# Patient Record
Sex: MALE | Race: WHITE | NOT HISPANIC OR LATINO | Employment: FULL TIME | URBAN - NONMETROPOLITAN AREA
[De-identification: names, ages, dates, MRNs, and addresses within clinical notes are randomized per-mention and may not be internally consistent; named-entity substitution may affect disease eponyms.]

---

## 2018-10-05 NOTE — PROGRESS NOTES
SUBJECTIVE:   CC: Bucky Franco is an 43 year old male who presents for preventative health visit.     Healthy Habits:    Do you get at least three servings of calcium containing foods daily (dairy, green leafy vegetables, etc.)? yes    Amount of exercise or daily activities, outside of work: 1-2 hour(s) per day    Problems taking medications regularly Yes Not taken any currently and would like another script for Flonase    Medication side effects: No    Have you had an eye exam in the past two years? No, he wears corrective lenses.  He denies any vision changes.    Do you see a dentist twice per year? yes    Do you have sleep apnea, excessive snoring or daytime drowsiness?yes, daytime drowsiness           -------------------------------------    Today's PHQ-2 Score:   PHQ-2 ( 1999 Pf[employer]) 3/31/2015 5/3/2013   Q1: Little interest or pleasure in doing things 0 0   Q2: Feeling down, depressed or hopeless 0 0   PHQ-2 Score 0 0       Abuse: Current or Past(Physical, Sexual or Emotional)- No  Do you feel safe in your environment - Yes    Social History   Substance Use Topics     Smoking status: Never Smoker     Smokeless tobacco: Former User     Types: Snuff     Quit date: 10/1/2015     Alcohol use Yes      Comment: Rarely      If you drink alcohol do you typically have >3 drinks per day or >7 drinks per week? No                      Last PSA: No results found for: PSA    Reviewed orders with patient. Reviewed health maintenance and updated orders accordingly - Yes      Reviewed and updated as needed this visit by clinical staff  Tobacco  Allergies  Meds  Med Hx  Surg Hx  Fam Hx  Soc Hx        Reviewed and updated as needed this visit by Provider        Past Medical History:   Diagnosis Date     GERD (gastroesophageal reflux disease) 05/18/2012     Gout, unspecified 09/07/2005     Hyperlipidemia 05/18/2012      Past Surgical History:   Procedure Laterality Date     ADENOIDECTOMY       APPENDECTOMY        CIRCUMCISION       lap band       Onychomycoses       TONSILLECTOMY         ROS:  CONSTITUTIONAL: NEGATIVE for fever, chills, change in weight  INTEGUMENTARY/SKIN: NEGATIVE for worrisome rashes, moles or lesions,  He denies any color changes with his feet getting cold.  EYES: NEGATIVE for vision changes or irritation  ENT: POSITIVE for right nasal congestion  RESP: NEGATIVE for significant cough or SOB  CV: NEGATIVE for chest pain, palpitations or peripheral edema  GI: POSITIVE for heartburn or reflux and NEGATIVE for abdominal pain, constipation, diarrhea, dysphagia, melena, nausea, poor appetite and vomiting   male: negative for dysuria, hematuria, decreased urinary stream, erectile dysfunction, urethral discharge  MUSCULOSKELETAL:Sharp pain in the feet.  He has left neck pain with spasms that cause posterior headaches.  He denies any back pain.  NEURO: POSITIVE for paresthesias of the feet with the feeling of his sock being bunched up under his toes.  He reports that his feet do get cold and cramp when they are cold and NEGATIVE for dizziness/lightheadedness  PSYCHIATRIC: NEGATIVE for changes in mood or affect        He has a family history of heart disease in the his paternal uncle.    OBJECTIVE:   BP (!) 138/98 (BP Location: Left arm, Patient Position: Sitting, Cuff Size: Adult Large)  Pulse 99  Temp 99.1  F (37.3  C) (Tympanic)  Resp 18  Wt 307 lb 6.4 oz (139.4 kg)  SpO2 97%  BMI 44.11 kg/m2  EXAM:  GENERAL: healthy, alert and no distress  EYES: Eyes grossly normal to inspection, PERRL and conjunctivae and sclerae normal  EYES: Eyes grossly normal to inspection and conjunctivae and sclerae normal  HENT: ear canals and TM's normal, nose and mouth without ulcers or lesions  NECK: no adenopathy, no asymmetry, masses, or scars and thyroid normal to palpation  RESP: lungs clear to auscultation - no rales, rhonchi or wheezes  CV: regular rate and rhythm, normal S1 S2, no S3 or S4, no murmur, click or rub, no  peripheral edema and peripheral pulses strong  ABDOMEN: soft, nontender, no hepatosplenomegaly, no masses and bowel sounds normal  ABDOMEN: no bruits heard and abdomen is obese  MS: no gross musculoskeletal defects noted, no edema  SKIN: two non elevated asymmetric, abnormally pigmented nevi over the left flank  NEURO: Normal strength and tone, mentation intact and speech normal  NEURO: cranial nerves 3-12 intact  PSYCH: mentation appears normal, affect normal/bright  LYMPH: no cervical, supraclavicular, axillary, or inguinal adenopathy  Skin: posterior neck shows erythematous hair follicles  : no hernia  Diagnostic Test Results:  Results for orders placed or performed in visit on 10/11/18 (from the past 24 hour(s))   Hemoglobin A1c   Result Value Ref Range    Hemoglobin A1C 10.7 (H) 0 - 5.6 %   CK total   Result Value Ref Range    CK Total 72 30 - 300 U/L   TSH   Result Value Ref Range    TSH 2.36 0.40 - 4.00 mU/L   T4 free   Result Value Ref Range    T4 Free 0.96 0.76 - 1.46 ng/dL   Albumin Random Urine Quantitative with Creat Ratio   Result Value Ref Range    Creatinine Urine 81 mg/dL    Albumin Urine mg/L 125 mg/L    Albumin Urine mg/g Cr 154.70 (H) 0 - 17 mg/g Cr       ASSESSMENT/PLAN:   (Z00.00) Routine general medical examination at health care facility  (primary encounter diagnosis)  Comment: He is up to date on immunization.  No cancer screening is due at this time per his age.  Plan:   Stress test due to newly diagnosed diabetes and family history of coronary artery disease.    (E11.42) Type 2 diabetes mellitus with diabetic polyneuropathy, without long-term current use of insulin (H)  Comment: New diabetes diagnosis.  He will start an ACE inhibitor and a STATIN  Plan:   Start  metFORMIN (GLUCOPHAGE-XR) 500 MG 24 hr tablet, 1000 mg daily for 2 weeks then BID aspirin 81        MG tablet daily.    (E66.01) Morbid obesity (H)  Comment: This is the primary reason for his diabetes which is secondary to  "insulin resistance.  Plan:   We will be discussing exercise to reduce his weight at his next visit.    (E78.1) Hypertriglyceridemia  Comment: His thyroid is normal.  Plan:   Start Crestor 10 mg daily     (I10) Benign essential hypertension  Comment: Not at goal with new diagnosis.  Plan:   Start lisinopril (PRINIVIL/ZESTRIL) 10 MG tablet    (E78.00) Hypercholesterolemia  Comment: The 10-year ASCVD risk score (Rafaelcolleen DOWNING Jr, et al., 2013) is: 9.6%    Values used to calculate the score:      Age: 43 years      Sex: Male      Is Non- : No      Diabetic: Yes      Tobacco smoker: No      Systolic Blood Pressure: 138 mmHg      Is BP treated: Yes      HDL Cholesterol: 26 mg/dL      Total Cholesterol: 221 mg/dL    Plan:   Start rosuvastatin (CRESTOR) 10 MG tablet      (L73.9) Folliculitis  Comment:  Plan:   sulfamethoxazole-trimethoprim (BACTRIM DS/SEPTRA DS) 800-160 MG per tablet BID for 2 weeks.    (Z87.19) Hx of gastroesophageal reflux (GERD)  Comment: He is on continual zantac and his hemoglobin is normal.  Plan:   He will continue zantac.  Weight loss would likely be most beneficial for his GERD.    (R09.81) Congestion of paranasal sinus  Comment:   Plan:   Restart fluticasone (FLONASE) 50 MCG/ACT spray 2 sprays BID.          COUNSELING:  Reviewed preventive health counseling, as reflected in patient instructions       Regular exercise       Aspirin Prophylaxsis    BP Readings from Last 1 Encounters:   10/11/18 (!) 138/98     Estimated body mass index is 44.11 kg/(m^2) as calculated from the following:    Height as of 3/31/15: 5' 10\" (1.778 m).    Weight as of this encounter: 307 lb 6.4 oz (139.4 kg).    BP Screening:   Last 3 BP Readings:    BP Readings from Last 3 Encounters:   10/11/18 (!) 138/98   03/31/15 140/90   03/13/15 (!) 134/98       The following was recommended to the patient:  Re-screen BP within a year and recommended lifestyle modifications  Weight management plan: Discussed healthy " diet and exercise guidelines and patient will follow up in 1 month in clinic to re-evaluate.     reports that he has never smoked. He quit smokeless tobacco use about 3 years ago. His smokeless tobacco use included Snuff.      Counseling Resources:  ATP IV Guidelines  Pooled Cohorts Equation Calculator  FRAX Risk Assessment  ICSI Preventive Guidelines  Dietary Guidelines for Americans, 2010  USDA's MyPlate  ASA Prophylaxis  Lung CA Screening    Beltran Beal DO,   [facility] [place]

## 2018-10-11 ENCOUNTER — OFFICE VISIT (OUTPATIENT)
Dept: PEDIATRICS | Facility: OTHER | Age: 43
End: 2018-10-11
Attending: INTERNAL MEDICINE
Payer: COMMERCIAL

## 2018-10-11 VITALS
OXYGEN SATURATION: 97 % | SYSTOLIC BLOOD PRESSURE: 138 MMHG | DIASTOLIC BLOOD PRESSURE: 98 MMHG | BODY MASS INDEX: 44.11 KG/M2 | WEIGHT: 307.4 LBS | RESPIRATION RATE: 18 BRPM | TEMPERATURE: 99.1 F | HEART RATE: 99 BPM

## 2018-10-11 DIAGNOSIS — Z00.00 ROUTINE GENERAL MEDICAL EXAMINATION AT HEALTH CARE FACILITY: ICD-10-CM

## 2018-10-11 DIAGNOSIS — R09.81 CONGESTION OF PARANASAL SINUS: ICD-10-CM

## 2018-10-11 DIAGNOSIS — Z87.19 HX OF GASTROESOPHAGEAL REFLUX (GERD): ICD-10-CM

## 2018-10-11 DIAGNOSIS — Z00.00 ROUTINE GENERAL MEDICAL EXAMINATION AT HEALTH CARE FACILITY: Primary | ICD-10-CM

## 2018-10-11 DIAGNOSIS — E78.00 HYPERCHOLESTEROLEMIA: ICD-10-CM

## 2018-10-11 DIAGNOSIS — L73.9 FOLLICULITIS: ICD-10-CM

## 2018-10-11 DIAGNOSIS — E78.1 HYPERTRIGLYCERIDEMIA: ICD-10-CM

## 2018-10-11 DIAGNOSIS — E66.01 MORBID OBESITY (H): ICD-10-CM

## 2018-10-11 DIAGNOSIS — E11.42 TYPE 2 DIABETES MELLITUS WITH DIABETIC POLYNEUROPATHY, WITHOUT LONG-TERM CURRENT USE OF INSULIN (H): ICD-10-CM

## 2018-10-11 DIAGNOSIS — I10 BENIGN ESSENTIAL HYPERTENSION: ICD-10-CM

## 2018-10-11 LAB
ALBUMIN SERPL-MCNC: 3.8 G/DL (ref 3.4–5)
ALP SERPL-CCNC: 219 U/L (ref 40–150)
ALT SERPL W P-5'-P-CCNC: 57 U/L (ref 0–70)
ANION GAP SERPL CALCULATED.3IONS-SCNC: 10 MMOL/L (ref 3–14)
AST SERPL W P-5'-P-CCNC: 28 U/L (ref 0–45)
BILIRUB SERPL-MCNC: 0.4 MG/DL (ref 0.2–1.3)
BUN SERPL-MCNC: 11 MG/DL (ref 7–30)
CALCIUM SERPL-MCNC: 8.9 MG/DL (ref 8.5–10.1)
CHLORIDE SERPL-SCNC: 96 MMOL/L (ref 94–109)
CHOLEST SERPL-MCNC: 221 MG/DL
CK SERPL-CCNC: 72 U/L (ref 30–300)
CO2 SERPL-SCNC: 27 MMOL/L (ref 20–32)
CREAT SERPL-MCNC: 0.73 MG/DL (ref 0.66–1.25)
CREAT UR-MCNC: 81 MG/DL
ERYTHROCYTE [DISTWIDTH] IN BLOOD BY AUTOMATED COUNT: 12.5 % (ref 10–15)
EST. AVERAGE GLUCOSE BLD GHB EST-MCNC: 260 MG/DL
GFR SERPL CREATININE-BSD FRML MDRD: >90 ML/MIN/1.7M2
GLUCOSE SERPL-MCNC: 354 MG/DL (ref 70–99)
HBA1C MFR BLD: 10.7 % (ref 0–5.6)
HCT VFR BLD AUTO: 45.3 % (ref 40–53)
HDLC SERPL-MCNC: 26 MG/DL
HGB BLD-MCNC: 16 G/DL (ref 13.3–17.7)
LDLC SERPL CALC-MCNC: ABNORMAL MG/DL
MCH RBC QN AUTO: 30 PG (ref 26.5–33)
MCHC RBC AUTO-ENTMCNC: 35.3 G/DL (ref 31.5–36.5)
MCV RBC AUTO: 85 FL (ref 78–100)
MICROALBUMIN UR-MCNC: 125 MG/L
MICROALBUMIN/CREAT UR: 154.7 MG/G CR (ref 0–17)
NONHDLC SERPL-MCNC: 195 MG/DL
PLATELET # BLD AUTO: 285 10E9/L (ref 150–450)
POTASSIUM SERPL-SCNC: 3.8 MMOL/L (ref 3.4–5.3)
PROT SERPL-MCNC: 8.2 G/DL (ref 6.8–8.8)
RBC # BLD AUTO: 5.34 10E12/L (ref 4.4–5.9)
SODIUM SERPL-SCNC: 133 MMOL/L (ref 133–144)
T4 FREE SERPL-MCNC: 0.96 NG/DL (ref 0.76–1.46)
TRIGL SERPL-MCNC: 891 MG/DL
TSH SERPL DL<=0.005 MIU/L-ACNC: 2.36 MU/L (ref 0.4–4)
WBC # BLD AUTO: 12.9 10E9/L (ref 4–11)

## 2018-10-11 PROCEDURE — 82043 UR ALBUMIN QUANTITATIVE: CPT | Performed by: INTERNAL MEDICINE

## 2018-10-11 PROCEDURE — 85027 COMPLETE CBC AUTOMATED: CPT | Performed by: INTERNAL MEDICINE

## 2018-10-11 PROCEDURE — 80061 LIPID PANEL: CPT | Performed by: INTERNAL MEDICINE

## 2018-10-11 PROCEDURE — 83036 HEMOGLOBIN GLYCOSYLATED A1C: CPT | Performed by: INTERNAL MEDICINE

## 2018-10-11 PROCEDURE — 99396 PREV VISIT EST AGE 40-64: CPT | Performed by: INTERNAL MEDICINE

## 2018-10-11 PROCEDURE — 84439 ASSAY OF FREE THYROXINE: CPT | Performed by: INTERNAL MEDICINE

## 2018-10-11 PROCEDURE — 84443 ASSAY THYROID STIM HORMONE: CPT | Performed by: INTERNAL MEDICINE

## 2018-10-11 PROCEDURE — 80053 COMPREHEN METABOLIC PANEL: CPT | Performed by: INTERNAL MEDICINE

## 2018-10-11 PROCEDURE — 36415 COLL VENOUS BLD VENIPUNCTURE: CPT | Performed by: INTERNAL MEDICINE

## 2018-10-11 PROCEDURE — 82550 ASSAY OF CK (CPK): CPT | Performed by: INTERNAL MEDICINE

## 2018-10-11 RX ORDER — FLUTICASONE PROPIONATE 50 MCG
2 SPRAY, SUSPENSION (ML) NASAL DAILY
Qty: 3 BOTTLE | Refills: 3 | Status: SHIPPED | OUTPATIENT
Start: 2018-10-11 | End: 2022-02-15

## 2018-10-11 RX ORDER — ROSUVASTATIN CALCIUM 10 MG/1
10 TABLET, COATED ORAL DAILY
Qty: 90 TABLET | Refills: 3 | Status: SHIPPED | OUTPATIENT
Start: 2018-10-11 | End: 2019-10-02

## 2018-10-11 RX ORDER — SULFAMETHOXAZOLE/TRIMETHOPRIM 800-160 MG
1 TABLET ORAL 2 TIMES DAILY
Qty: 28 TABLET | Refills: 0 | Status: SHIPPED | OUTPATIENT
Start: 2018-10-11 | End: 2019-04-17

## 2018-10-11 RX ORDER — METFORMIN HCL 500 MG
1000 TABLET, EXTENDED RELEASE 24 HR ORAL 2 TIMES DAILY WITH MEALS
Qty: 120 TABLET | Refills: 3 | Status: SHIPPED | OUTPATIENT
Start: 2018-10-11 | End: 2019-01-10

## 2018-10-11 RX ORDER — LISINOPRIL 10 MG/1
10 TABLET ORAL DAILY
Qty: 90 TABLET | Refills: 1 | Status: SHIPPED | OUTPATIENT
Start: 2018-10-11 | End: 2018-10-31

## 2018-10-11 ASSESSMENT — ANXIETY QUESTIONNAIRES
5. BEING SO RESTLESS THAT IT IS HARD TO SIT STILL: NOT AT ALL
6. BECOMING EASILY ANNOYED OR IRRITABLE: SEVERAL DAYS
GAD7 TOTAL SCORE: 2
7. FEELING AFRAID AS IF SOMETHING AWFUL MIGHT HAPPEN: NOT AT ALL
IF YOU CHECKED OFF ANY PROBLEMS ON THIS QUESTIONNAIRE, HOW DIFFICULT HAVE THESE PROBLEMS MADE IT FOR YOU TO DO YOUR WORK, TAKE CARE OF THINGS AT HOME, OR GET ALONG WITH OTHER PEOPLE: NOT DIFFICULT AT ALL
2. NOT BEING ABLE TO STOP OR CONTROL WORRYING: NOT AT ALL
1. FEELING NERVOUS, ANXIOUS, OR ON EDGE: NOT AT ALL
3. WORRYING TOO MUCH ABOUT DIFFERENT THINGS: NOT AT ALL

## 2018-10-11 ASSESSMENT — PATIENT HEALTH QUESTIONNAIRE - PHQ9: 5. POOR APPETITE OR OVEREATING: SEVERAL DAYS

## 2018-10-11 ASSESSMENT — PAIN SCALES - GENERAL: PAINLEVEL: MILD PAIN (3)

## 2018-10-11 NOTE — MR AVS SNAPSHOT
After Visit Summary   10/11/2018    Bucky Franco    MRN: [id number]           Patient Information     Date Of Birth          1975        Visit Information        Provider Department      10/11/2018 1:20 PM Beltran Beal DO [facility] [place]        Today's Diagnoses     Routine general medical examination at health care facility    -  1    Hx of gastroesophageal reflux (GERD)        Congestion of paranasal sinus        Type 2 diabetes mellitus with diabetic polyneuropathy, without long-term current use of insulin (H)        Morbid obesity (H)        Hypertriglyceridemia        Benign essential hypertension        Hypercholesterolemia        Folliculitis          Care Instructions      Preventive Health Recommendations  Male Ages 40 to 49    Yearly exam:             See your health care provider every year in order to  o   Review health changes.   o   Discuss preventive care.    o   Review your medicines if your doctor has prescribed any.    You should be tested each year for STDs (sexually transmitted diseases) if you re at risk.     Have a cholesterol test every 5 years.     Have a colonoscopy (test for colon cancer) if someone in your family has had colon cancer or polyps before age 50.     After age 45, have a diabetes test (fasting glucose). If you are at risk for diabetes, you should have this test every 3 years.      Talk with your health care provider about whether or not a prostate cancer screening test (PSA) is right for you.    Shots: Get a flu shot each year. Get a tetanus shot every 10 years.     Nutrition:    Eat at least 5 servings of fruits and vegetables daily.     Eat whole-grain bread, whole-wheat pasta and brown rice instead of white grains and rice.     Get adequate Calcium and Vitamin D.     Lifestyle    Exercise for at least 150 minutes a week (30 minutes a day, 5 days a week). This will help you control your weight and prevent disease.     Limit  alcohol to one drink per day.     No smoking.     Wear sunscreen to prevent skin cancer.     See your dentist every six months for an exam and cleaning.              Follow-ups after your visit        Follow-up notes from your care team     Return in about 3 weeks (around 10/31/2018) for diabetes and HTN.      Your next 10 appointments already scheduled     Oct 31, 2018  2:00 PM CDT   (Arrive by 1:40 PM)   [facility] with Beltran Beal,    [facility]    [address] [place] [address]   [phone]              Who to contact     If you have questions or need follow up information about today's clinic visit or your schedule please contact [facility] directly at [phone].  Normal or non-critical lab and imaging results will be communicated to you by MyChart, letter or phone within 4 business days after the clinic has received the results. If you do not hear from us within 7 days, please contact the clinic through MyChart or phone. If you have a critical or abnormal lab result, we will notify you by phone as soon as possible.  Submit refill requests through [employer] or call your pharmacy and they will forward the refill request to us. Please allow 3 business days for your refill to be completed.          Additional Information About Your Visit        Care EveryWhere ID     This is your Care EveryWhere ID. This could be used by other organizations to access your [facility] medical records  [id number]        Your Vitals Were     Pulse Temperature Respirations Pulse Oximetry BMI (Body Mass Index)       99 99.1  F (37.3  C) (Tympanic) 18 97% 44.11 kg/m2        Blood Pressure from Last 3 Encounters:   10/11/18 (!) 138/98   03/31/15 140/90   03/13/15 (!) 134/98    Weight from Last 3 Encounters:   10/11/18 307 lb 6.4 oz (139.4 kg)   03/31/15 (!) 325 lb (147.4 kg)   03/13/15 (!) 328 lb (148.8 kg)              We Performed the  Following     Albumin Random Urine Quantitative with Creat Ratio     CK total     Hemoglobin A1c     T4 free     TSH          Today's Medication Changes          These changes are accurate as of 10/11/18  2:03 PM.  If you have any questions, ask your nurse or doctor.               Start taking these medicines.        Dose/Directions    lisinopril 10 MG tablet   Commonly known as:  PRINIVIL/ZESTRIL   Used for:  Type 2 diabetes mellitus with diabetic polyneuropathy, without long-term current use of insulin (H), Benign essential hypertension   Started by:  Beltran Beal DO        Dose:  10 mg   Take 1 tablet (10 mg) by mouth daily   Quantity:  90 tablet   Refills:  1       metFORMIN 500 MG 24 hr tablet   Commonly known as:  GLUCOPHAGE-XR   Used for:  Type 2 diabetes mellitus with diabetic polyneuropathy, without long-term current use of insulin (H)   Started by:  Beltran Beal DO        Dose:  1000 mg   Take 2 tablets (1,000 mg) by mouth 2 times daily (with meals)   Quantity:  120 tablet   Refills:  3       rosuvastatin 10 MG tablet   Commonly known as:  CRESTOR   Used for:  Type 2 diabetes mellitus with diabetic polyneuropathy, without long-term current use of insulin (H), Hypercholesterolemia   Started by:  Beltran Beal DO        Dose:  10 mg   Take 1 tablet (10 mg) by mouth daily   Quantity:  90 tablet   Refills:  3       sulfamethoxazole-trimethoprim 800-160 MG per tablet   Commonly known as:  BACTRIM DS/SEPTRA DS   Used for:  Folliculitis   Started by:  Beltran Beal DO        Dose:  1 tablet   Take 1 tablet by mouth 2 times daily for 14 days   Quantity:  28 tablet   Refills:  0         Stop taking these medicines if you haven't already. Please contact your care team if you have questions.     ADVIL 200 MG tablet   Generic drug:  ibuprofen   Stopped by:  Beltran Beal DO                Where to get your medicines      These medications were sent to [facility]  [facility] [address] [facility]  [address] [facility] [address]     Phone:  [phone]     fluticasone 50 MCG/ACT spray    lisinopril 10 MG tablet    metFORMIN 500 MG 24 hr tablet    rosuvastatin 10 MG tablet    sulfamethoxazole-trimethoprim 800-160 MG per tablet                Primary Care Provider Office Phone # Fax #    Beltran Beal  [phone]       [address] [facility] [address]        Equal Access to Services     [facility] : Hadii aad ku hadasho Soomaali, waaxda luqadaha, qaybta kaalmada adeegyada, waxay idiin hayaan drea adamson . So [facility] [phone].    ATENCIÓN: Si habla español, tiene a zaragoza disposición servicios gratuitos de asistencia lingüística. Ll[facility] [phone].    We comply with applicable federal civil rights laws and [place] laws. We do not discriminate on the basis of race, color, national origin, age, disability, sex, sexual orientation, or gender identity.            Thank you!     Thank you for choosing [facility]  for your care. Our goal is always to provide you with excellent care. Hearing back from our patients is one way we can continue to improve our services. Please take a few minutes to complete the written survey that you may receive in the mail after your visit with us. Thank you!             Your Updated Medication List - Protect others around you: Learn how to safely use, store and throw away your medicines at www.disposemymeds.org.          This list is accurate as of 10/11/18  2:03 PM.  Always use your most recent med list.                   Brand Name Dispense Instructions for use Diagnosis    aspirin 81 MG tablet     90 tablet    Take 1 tablet (81 mg) by mouth daily    Type 2 diabetes mellitus with diabetic polyneuropathy, without long-term current use of insulin (H)       fluticasone 50 MCG/ACT spray    FLONASE    3 Bottle    Spray 2 sprays into both nostrils daily    Congestion  of paranasal sinus       lisinopril 10 MG tablet    PRINIVIL/ZESTRIL    90 tablet    Take 1 tablet (10 mg) by mouth daily    Type 2 diabetes mellitus with diabetic polyneuropathy, without long-term current use of insulin (H), Benign essential hypertension       metFORMIN 500 MG 24 hr tablet    GLUCOPHAGE-XR    120 tablet    Take 2 tablets (1,000 mg) by mouth 2 times daily (with meals)    Type 2 diabetes mellitus with diabetic polyneuropathy, without long-term current use of insulin (H)       rosuvastatin 10 MG tablet    CRESTOR    90 tablet    Take 1 tablet (10 mg) by mouth daily    Type 2 diabetes mellitus with diabetic polyneuropathy, without long-term current use of insulin (H), Hypercholesterolemia       sulfamethoxazole-trimethoprim 800-160 MG per tablet    BACTRIM DS/SEPTRA DS    28 tablet    Take 1 tablet by mouth 2 times daily for 14 days    Folliculitis

## 2018-10-11 NOTE — NURSING NOTE
"Chief Complaint   Patient presents with     Physical       Initial BP (!) 158/101 (BP Location: Left arm, Patient Position: Sitting, Cuff Size: Adult Large)  Pulse 99  Temp 99.1  F (37.3  C) (Tympanic)  Resp 18  Wt 307 lb 6.4 oz (139.4 kg)  SpO2 97%  BMI 44.11 kg/m2 Estimated body mass index is 44.11 kg/(m^2) as calculated from the following:    Height as of 3/31/15: 5' 10\" (1.778 m).    Weight as of this encounter: 307 lb 6.4 oz (139.4 kg).  Medication Reconciliation: complete     Patient declined Influenza vaccine. BP recheck; 138/98 at 13:10    Susie Arriola LPN  "

## 2018-10-12 ASSESSMENT — PATIENT HEALTH QUESTIONNAIRE - PHQ9: SUM OF ALL RESPONSES TO PHQ QUESTIONS 1-9: 5

## 2018-10-12 ASSESSMENT — ANXIETY QUESTIONNAIRES: GAD7 TOTAL SCORE: 2

## 2018-10-26 NOTE — PROGRESS NOTES
SUBJECTIVE:   Bucky Franco is a 43 year old male who presents to clinic today for the following health issues:        Diabetes Follow-up      Patient is checking blood sugars: not at all    Diabetic concerns: None     Symptoms of hypoglycemia (low blood sugar): none     Paresthesias (numbness or burning in feet) or sores: Yes both feet, tingling and numbness and cold, but not cold to touch.     Date of last diabetic eye exam: Has not had one      BP Readings from Last 6 Encounters:   10/31/18 142/90   10/11/18 (!) 138/98   03/31/15 140/90   03/13/15 (!) 134/98   05/03/13 134/80     BP Readings from Last 2 Encounters:   10/11/18 (!) 138/98   03/31/15 140/90     Hemoglobin A1C (%)   Date Value   10/11/2018 10.7 (H)     LDL Cholesterol Calculated (mg/dL)   Date Value   10/11/2018     Cannot estimate LDL when triglyceride exceeds 400 mg/dL       Diabetes Management Resources    Amount of exercise or physical activity: 3 day/week for an average of 45-60 minutes    Problems taking medications regularly: No    Medication side effects: Headaches    Diet: regular (no restrictions)        Hypertension Follow-up      Outpatient blood pressures are not being checked.    Low Salt Diet: no added salt      Problem list and histories reviewed & adjusted, as indicated.  Additional history: as documented    Patient Active Problem List   Diagnosis     Health examination of defined subpopulation     Sinus headache     Somatic dysfunction of cervical region     Headache     Morbid obesity (H)     Type 2 diabetes mellitus without complication, without long-term current use of insulin (H)     Past Surgical History:   Procedure Laterality Date     ADENOIDECTOMY       APPENDECTOMY       CIRCUMCISION       lap band       Onychomycoses       TONSILLECTOMY         Social History   Substance Use Topics     Smoking status: Never Smoker     Smokeless tobacco: Former User     Types: Snuff     Quit date: 10/1/2015     Alcohol use Yes       "Comment: Rarely     Family History   Problem Relation Age of Onset     Hypertension Mother      Rheumatoid Arthritis Mother      Hyperlipidemia Father      Myocardial Infarction Paternal Grandfather      Diabetes Other      Asthma No family hx of            Reviewed and updated as needed this visit by clinical staff       Reviewed and updated as needed this visit by Provider         ROS:  CONSTITUTIONAL: NEGATIVE for fever, chills, change in weight  EYES: NEGATIVE for vision changes or irritation  ENT/MOUTH: NEGATIVE for ear, mouth and throat problems  RESP: NEGATIVE for significant cough or SOB  CV: NEGATIVE for chest pain, palpitations or peripheral edema  GI: NEGATIVE for nausea, abdominal pain, heartburn, or change in bowel habits  : NEGATIVE for frequency, dysuria, or hematuria  MUSCULOSKELETAL:POSITIVE  for neck pain in the form of spasms   NEURO: NEGATIVE for weakness, dizziness or paresthesias  PSYCHIATRIC: NEGATIVE for changes in mood or affect    OBJECTIVE:     /90  Pulse 82  Temp 98.6  F (37  C) (Tympanic)  Ht 5' 10\" (1.778 m)  Wt 302 lb (137 kg)  SpO2 98%  BMI 43.33 kg/m2  Body mass index is 43.33 kg/(m^2).  GENERAL: healthy, alert and no distress  EYES: Eyes grossly normal to inspection and conjunctivae and sclerae normal  NECK: no adenopathy, no asymmetry, masses, or scars and thyroid normal to palpation  RESP: lungs clear to auscultation - no rales, rhonchi or wheezes  CV: regular rate and rhythm, normal S1 S2, no S3 or S4, no murmur, click or rub, no peripheral edema and peripheral pulses strong  ABDOMEN: soft, nontender, no hepatosplenomegaly, no masses and bowel sounds normal  ABDOMEN: no bruits heard  MS: no gross musculoskeletal defects noted, no edema  SKIN: no suspicious lesions or rashes  SKIN: Mild erythema over the upper neck   PSYCH: mentation appears normal, affect normal/bright  Diabetic foot exam: normal DP and PT pulses, normal monofilament exam, reduced sensation at the " left 1st MTP and trophic changes of the heals with this callus bilaterally    Diagnostic Test Results:  Results for orders placed or performed in visit on 10/31/18 (from the past 24 hour(s))   Basic metabolic panel   Result Value Ref Range    Sodium 138 133 - 144 mmol/L    Potassium 3.8 3.4 - 5.3 mmol/L    Chloride 103 94 - 109 mmol/L    Carbon Dioxide 30 20 - 32 mmol/L    Anion Gap 5 3 - 14 mmol/L    Glucose 148 (H) 70 - 99 mg/dL    Urea Nitrogen 14 7 - 30 mg/dL    Creatinine 0.66 0.66 - 1.25 mg/dL    GFR Estimate >90 >60 mL/min/1.7m2    GFR Estimate If Black >90 >60 mL/min/1.7m2    Calcium 8.5 8.5 - 10.1 mg/dL   Fasting LAB      10/29/18  9:34 AM [id number] HI NUCLEAR MEDICINE       HI Electrocardiology    [employer] Interactive Report and InfoRx   View the interactive report   PACS Images   Show images for NM Exercise stress test   Study Result   PROCEDURE: NM MPI TREADMILL 10/29/2018 9:34 AM     HISTORY: ; Type 2 diabetes mellitus with diabetic polyneuropathy,  without long-term current use of insulin (H); Morbid obesity (H);  Hypercholesterolemia; Benign essential hypertension     COMPARISONS: None.     TECHNIQUE: Rest 10 mCi of technetium 99m sestamibi was injected. The  patient was stressed and 30 mCi of technetium 99m sestamibi was  injected.     FINDINGS: On the stress and rest images there was normal distribution  of tracer activity throughout the myocardium. There is no evidence of  myocardial ischemia. The end-diastolic volume measured 118 mL. The  end-systolic volume was 30 mL's. The calculated ejection fraction was  75%. No wall motion abnormalities are noted.          IMPRESSION:   1. No evidence of myocardial ischemia.  2. Normal left ventricular function.     RICHARD DORSEY MD     External Result Report   Stress EKG Interpretation   Order: [id number]   Status:  Final result   Visible to patient:  No (Not Released) Next appt:  None Dx:  Morbid obesity (H); Benign essential ...   Details   Reading  Physician Reading Date Result Priority   River Peck MD 10/29/2018    Narrative         This is a exercise Cardiolite study on patient Bucky Franco ordered by  Dr. Beal due to multiple cardiac risk factors.    The patient was placed upon the treadmill using Artem protocol. Went  for a total time of 5 minutes and 15 seconds into stage II at which  time was discontinued secondary to fatigue especially leg fatigue. Did  not develop any chest pain.    Resting heart rate was 94 suma to 175. Resting blood pressure 155/90  went to 210/90 for a double product of 36,700. He achieved 7 METs and  98% of maximum predicted heart rate.    Review of the electrocardiogram shows no acute ST-T changes. There  were no arrhythmias.    ASSESSMENT: Exercise Cardiolite study which the patient was limited by  fatigue especially his legs. Objectively adequate double product  significant hypertensive response with no evidence of ischemia on the  EKG. The Cardiolite scans are pending.    RIVER PECK MD       Last Resulted: 10/29/18  6:10 PM Order Details View Encounter Lab and Collection Details Routing Result History                ASSESSMENT/PLAN:   (E11.9) Type 2 diabetes mellitus without complication, without long-term current use of insulin (H)  Comment: Improved control on his fasting glucose level.  He is on an ACE inhibitor and a STATIN  Plan:    OPHTHALMOLOGY ADULT REFERRAL  He will continue metformin and start exercising 5 days a week as instructed.    (I10) Benign essential hypertension  Comment: Not at goal.  Plan:  Increase lisinopril (PRINIVIL/ZESTRIL) from 10 to 20 MG tablet      (L73.9) Folliculitis  Comment: Much improved  Plan: sulfamethoxazole-trimethoprim (BACTRIM DS/SEPTRA DS) 800-160 MG per tablet BID for 10 days.    (E66.01) Morbid obesity (H)  Comment: His weight is a main contributor ofg his diabetes.  His cardiac stress test is normal.  Plan:   He will start walking 5 days per week.    Patient  Instructions   For exercise:  Walk at a brisk pace for 10-15 minutes 5 days.  Increase time weekly by 3-5 minutes to a goal of 60 minutes.      (E11.9) Encounter for diabetic foot exam (H)  (primary encounter diagnosis)  Comment: He has a good foot exam with some mild hell callus forming.  Plan:   He was educated on foot care and skin care and agrees to apply Eucerin cream once daily.    FUTURE APPOINTMENTS:       - Follow-up visit in 10 weeks for diabetes, obesity and HTN    Beltran Beal DO, DO  [facility] [place]

## 2018-10-29 ENCOUNTER — HOSPITAL ENCOUNTER (OUTPATIENT)
Dept: NUCLEAR MEDICINE | Facility: HOSPITAL | Age: 43
Setting detail: NUCLEAR MEDICINE
Discharge: HOME | End: 2018-10-29
Attending: INTERNAL MEDICINE | Admitting: INTERNAL MEDICINE
Payer: COMMERCIAL

## 2018-10-29 ENCOUNTER — HOSPITAL ENCOUNTER (OUTPATIENT)
Dept: CARDIOLOGY | Facility: HOSPITAL | Age: 43
Setting detail: NUCLEAR MEDICINE
End: 2018-10-29
Attending: INTERNAL MEDICINE
Payer: COMMERCIAL

## 2018-10-29 ENCOUNTER — HOSPITAL ENCOUNTER (OUTPATIENT)
Dept: NUCLEAR MEDICINE | Facility: HOSPITAL | Age: 43
Setting detail: NUCLEAR MEDICINE
End: 2018-10-29
Attending: INTERNAL MEDICINE
Payer: COMMERCIAL

## 2018-10-29 DIAGNOSIS — E66.01 MORBID OBESITY (H): ICD-10-CM

## 2018-10-29 DIAGNOSIS — I10 BENIGN ESSENTIAL HYPERTENSION: ICD-10-CM

## 2018-10-29 DIAGNOSIS — E78.00 HYPERCHOLESTEROLEMIA: ICD-10-CM

## 2018-10-29 DIAGNOSIS — E11.42 TYPE 2 DIABETES MELLITUS WITH DIABETIC POLYNEUROPATHY, WITHOUT LONG-TERM CURRENT USE OF INSULIN (H): ICD-10-CM

## 2018-10-29 PROCEDURE — A9500 TC99M SESTAMIBI: HCPCS | Performed by: RADIOLOGY

## 2018-10-29 PROCEDURE — 78452 HT MUSCLE IMAGE SPECT MULT: CPT | Mod: TC

## 2018-10-29 PROCEDURE — 93017 CV STRESS TEST TRACING ONLY: CPT

## 2018-10-29 PROCEDURE — 34300033 ZZH RX 343: Performed by: RADIOLOGY

## 2018-10-29 PROCEDURE — 93016 CV STRESS TEST SUPVJ ONLY: CPT | Performed by: INTERNAL MEDICINE

## 2018-10-29 PROCEDURE — 93018 CV STRESS TEST I&R ONLY: CPT | Performed by: INTERNAL MEDICINE

## 2018-10-29 PROCEDURE — 25000128 H RX IP 250 OP 636: Performed by: INTERNAL MEDICINE

## 2018-10-29 RX ORDER — SODIUM CHLORIDE 9 MG/ML
INJECTION, SOLUTION INTRAVENOUS ONCE
Status: COMPLETED | OUTPATIENT
Start: 2018-10-29 | End: 2018-10-29

## 2018-10-29 RX ADMIN — Medication 30 MILLICURIE: at 08:18

## 2018-10-29 RX ADMIN — SODIUM CHLORIDE: 9 INJECTION, SOLUTION INTRAVENOUS at 08:05

## 2018-10-29 RX ADMIN — Medication 10 MILLICURIE: at 06:36

## 2018-10-30 PROBLEM — E11.9 TYPE 2 DIABETES MELLITUS WITHOUT COMPLICATION, WITHOUT LONG-TERM CURRENT USE OF INSULIN (H): Status: ACTIVE | Noted: 2018-10-30

## 2018-10-31 ENCOUNTER — OFFICE VISIT (OUTPATIENT)
Dept: PEDIATRICS | Facility: OTHER | Age: 43
End: 2018-10-31
Attending: INTERNAL MEDICINE
Payer: COMMERCIAL

## 2018-10-31 VITALS
OXYGEN SATURATION: 98 % | BODY MASS INDEX: 43.23 KG/M2 | HEART RATE: 82 BPM | SYSTOLIC BLOOD PRESSURE: 140 MMHG | DIASTOLIC BLOOD PRESSURE: 80 MMHG | WEIGHT: 302 LBS | HEIGHT: 70 IN | TEMPERATURE: 98.6 F

## 2018-10-31 DIAGNOSIS — I10 BENIGN ESSENTIAL HYPERTENSION: ICD-10-CM

## 2018-10-31 DIAGNOSIS — E11.9 ENCOUNTER FOR DIABETIC FOOT EXAM (H): Primary | ICD-10-CM

## 2018-10-31 DIAGNOSIS — E11.9 TYPE 2 DIABETES MELLITUS WITHOUT COMPLICATION, WITHOUT LONG-TERM CURRENT USE OF INSULIN (H): ICD-10-CM

## 2018-10-31 DIAGNOSIS — L73.9 FOLLICULITIS: ICD-10-CM

## 2018-10-31 DIAGNOSIS — E11.42 TYPE 2 DIABETES MELLITUS WITH DIABETIC POLYNEUROPATHY, WITHOUT LONG-TERM CURRENT USE OF INSULIN (H): ICD-10-CM

## 2018-10-31 DIAGNOSIS — E66.01 MORBID OBESITY (H): ICD-10-CM

## 2018-10-31 LAB
ANION GAP SERPL CALCULATED.3IONS-SCNC: 5 MMOL/L (ref 3–14)
BUN SERPL-MCNC: 14 MG/DL (ref 7–30)
CALCIUM SERPL-MCNC: 8.5 MG/DL (ref 8.5–10.1)
CHLORIDE SERPL-SCNC: 103 MMOL/L (ref 94–109)
CO2 SERPL-SCNC: 30 MMOL/L (ref 20–32)
CREAT SERPL-MCNC: 0.66 MG/DL (ref 0.66–1.25)
GFR SERPL CREATININE-BSD FRML MDRD: >90 ML/MIN/1.7M2
GLUCOSE SERPL-MCNC: 148 MG/DL (ref 70–99)
POTASSIUM SERPL-SCNC: 3.8 MMOL/L (ref 3.4–5.3)
SODIUM SERPL-SCNC: 138 MMOL/L (ref 133–144)

## 2018-10-31 PROCEDURE — 80048 BASIC METABOLIC PNL TOTAL CA: CPT | Performed by: INTERNAL MEDICINE

## 2018-10-31 PROCEDURE — 99214 OFFICE O/P EST MOD 30 MIN: CPT | Performed by: INTERNAL MEDICINE

## 2018-10-31 PROCEDURE — 36415 COLL VENOUS BLD VENIPUNCTURE: CPT | Performed by: INTERNAL MEDICINE

## 2018-10-31 RX ORDER — SULFAMETHOXAZOLE/TRIMETHOPRIM 800-160 MG
1 TABLET ORAL 2 TIMES DAILY
Qty: 20 TABLET | Refills: 0 | Status: SHIPPED | OUTPATIENT
Start: 2018-10-31 | End: 2019-01-10

## 2018-10-31 RX ORDER — LISINOPRIL 20 MG/1
20 TABLET ORAL DAILY
Qty: 90 TABLET | Refills: 1 | Status: SHIPPED | OUTPATIENT
Start: 2018-10-31 | End: 2019-01-31

## 2018-10-31 ASSESSMENT — ANXIETY QUESTIONNAIRES
5. BEING SO RESTLESS THAT IT IS HARD TO SIT STILL: NOT AT ALL
2. NOT BEING ABLE TO STOP OR CONTROL WORRYING: NOT AT ALL
3. WORRYING TOO MUCH ABOUT DIFFERENT THINGS: NOT AT ALL
7. FEELING AFRAID AS IF SOMETHING AWFUL MIGHT HAPPEN: NOT AT ALL
GAD7 TOTAL SCORE: 0
6. BECOMING EASILY ANNOYED OR IRRITABLE: NOT AT ALL
1. FEELING NERVOUS, ANXIOUS, OR ON EDGE: NOT AT ALL

## 2018-10-31 ASSESSMENT — PAIN SCALES - GENERAL: PAINLEVEL: MODERATE PAIN (5)

## 2018-10-31 ASSESSMENT — PATIENT HEALTH QUESTIONNAIRE - PHQ9
5. POOR APPETITE OR OVEREATING: NOT AT ALL
SUM OF ALL RESPONSES TO PHQ QUESTIONS 1-9: 2

## 2018-10-31 NOTE — MR AVS SNAPSHOT
After Visit Summary   10/31/2018    Bucky Franco    MRN: [id number]           Patient Information     Date Of Birth          1975        Visit Information        Provider Department      10/31/2018 2:00 PM Beltran Beal DO [facility] [place]        Today's Diagnoses     Encounter for diabetic foot exam (H)    -  1    Type 2 diabetes mellitus without complication, without long-term current use of insulin (H)        Benign essential hypertension        Folliculitis        Type 2 diabetes mellitus with diabetic polyneuropathy, without long-term current use of insulin (H)        Morbid obesity (H)          Care Instructions    For exercise:  Walk at a brisk pace for 10-15 minutes 5 days.  Increase time weekly by 3-5 minutes to a goal of 60 minutes.          Follow-ups after your visit        Additional Services     OPHTHALMOLOGY ADULT REFERRAL       Your provider has referred you to: PREFERRED PROVIDERS:  [facility]    Please be aware that coverage of these services is subject to the terms and limitations of your health insurance plan.  Call member services at your health plan with any benefit or coverage questions.      Please bring the following with you to your appointment:    (1) Any X-Rays, CTs or MRIs which have been performed.  Contact the facility where they were done to arrange for  prior to your scheduled appointment.    (2) List of current medications  (3) This referral request   (4) Any documents/labs given to you for this referral                  Follow-up notes from your care team     Return in about 10 weeks (around 1/9/2019) for Diabetes .      Your next 10 appointments already scheduled     Alberto 10, 2019  9:40 AM CST   (Arrive by 9:20 AM)   [facility] [place] [facility] [place] )    [address] [place] [address]   [phone]              Who to contact     If you  "have questions or need follow up information about today's clinic visit or your schedule please contact [facility] directly at [phone].  Normal or non-critical lab and imaging results will be communicated to you by MyChart, letter or phone within 4 business days after the clinic has received the results. If you do not hear from us within 7 days, please contact the clinic through MyChart or phone. If you have a critical or abnormal lab result, we will notify you by phone as soon as possible.  Submit refill requests through [employer] or call your pharmacy and they will forward the refill request to us. Please allow 3 business days for your refill to be completed.          Additional Information About Your Visit        Care EveryWhere ID     This is your Care EveryWhere ID. This could be used by other organizations to access your [facility] medical records  [id number]        Your Vitals Were     Pulse Temperature Height Pulse Oximetry BMI (Body Mass Index)       82 98.6  F (37  C) (Tympanic) 5' 10\" (1.778 m) 98% 43.33 kg/m2        Blood Pressure from Last 3 Encounters:   10/31/18 140/80   10/11/18 (!) 138/98   03/31/15 140/90    Weight from Last 3 Encounters:   10/31/18 302 lb (137 kg)   10/11/18 307 lb 6.4 oz (139.4 kg)   03/31/15 (!) 325 lb (147.4 kg)              We Performed the Following     OPHTHALMOLOGY ADULT REFERRAL          Today's Medication Changes          These changes are accurate as of 10/31/18  2:33 PM.  If you have any questions, ask your nurse or doctor.               Start taking these medicines.        Dose/Directions    sulfamethoxazole-trimethoprim 800-160 MG per tablet   Commonly known as:  BACTRIM DS/SEPTRA DS   Used for:  Folliculitis   Started by:  Beltran Beal DO        Dose:  1 tablet   Take 1 tablet by mouth 2 times daily   Quantity:  20 tablet   Refills:  0         These medicines have changed or have updated prescriptions.        Dose/Directions    " lisinopril 20 MG tablet   Commonly known as:  PRINIVIL/ZESTRIL   This may have changed:    - medication strength  - how much to take   Used for:  Type 2 diabetes mellitus with diabetic polyneuropathy, without long-term current use of insulin (H), Benign essential hypertension   Changed by:  Beltran Beal DO        Dose:  20 mg   Take 1 tablet (20 mg) by mouth daily   Quantity:  90 tablet   Refills:  1            Where to get your medicines      These medications were sent to [facility] [place] - [address]  [address] [address]     Phone:  [phone]     lisinopril 20 MG tablet    sulfamethoxazole-trimethoprim 800-160 MG per tablet                Primary Care Provider Office Phone # Fax #    Beltran Beal [phone] [address] [facility] [address]        Equal Access to Services     [facility] : Hadii iona perez hadasho Soomaali, waaxda luqadaha, qaybta kaalmada adeegyada, waxay rhysin haynikkyn drea adamson . So [facility] [phone].    ATENCIÓN: Si habla español, tiene a zaragoza disposición servicios gratuitos de asistencia lingüística. [facility] [phone].    We comply with applicable federal civil rights laws and [place] laws. We do not discriminate on the basis of race, color, national origin, age, disability, sex, sexual orientation, or gender identity.            Thank you!     Thank you for choosing [facility]  for your care. Our goal is always to provide you with excellent care. Hearing back from our patients is one way we can continue to improve our services. Please take a few minutes to complete the written survey that you may receive in the mail after your visit with us. Thank you!             Your Updated Medication List - Protect others around you: Learn how to safely use, store and throw away your medicines at www.disposemymeds.org.          This list is accurate as of 10/31/18  2:33 PM.   Always use your most recent med list.                   Brand Name Dispense Instructions for use Diagnosis    aspirin 81 MG tablet     90 tablet    Take 1 tablet (81 mg) by mouth daily    Type 2 diabetes mellitus with diabetic polyneuropathy, without long-term current use of insulin (H)       fluticasone 50 MCG/ACT spray    FLONASE    3 Bottle    Spray 2 sprays into both nostrils daily    Congestion of paranasal sinus       lisinopril 20 MG tablet    PRINIVIL/ZESTRIL    90 tablet    Take 1 tablet (20 mg) by mouth daily    Type 2 diabetes mellitus with diabetic polyneuropathy, without long-term current use of insulin (H), Benign essential hypertension       metFORMIN 500 MG 24 hr tablet    GLUCOPHAGE-XR    120 tablet    Take 2 tablets (1,000 mg) by mouth 2 times daily (with meals)    Type 2 diabetes mellitus with diabetic polyneuropathy, without long-term current use of insulin (H)       rosuvastatin 10 MG tablet    CRESTOR    90 tablet    Take 1 tablet (10 mg) by mouth daily    Type 2 diabetes mellitus with diabetic polyneuropathy, without long-term current use of insulin (H), Hypercholesterolemia       sulfamethoxazole-trimethoprim 800-160 MG per tablet    BACTRIM DS/SEPTRA DS    20 tablet    Take 1 tablet by mouth 2 times daily    Folliculitis

## 2018-10-31 NOTE — NURSING NOTE
"Chief Complaint   Patient presents with     Diabetes       Initial /90  Pulse 82  Temp 98.6  F (37  C) (Tympanic)  Ht 5' 10\" (1.778 m)  Wt 302 lb (137 kg)  SpO2 98%  BMI 43.33 kg/m2 Estimated body mass index is 43.33 kg/(m^2) as calculated from the following:    Height as of this encounter: 5' 10\" (1.778 m).    Weight as of this encounter: 302 lb (137 kg).  Medication Reconciliation: complete    Ellie Virgen LPN  "

## 2018-10-31 NOTE — PATIENT INSTRUCTIONS
For exercise:  Walk at a brisk pace for 10-15 minutes 5 days.  Increase time weekly by 3-5 minutes to a goal of 60 minutes.

## 2018-11-01 ASSESSMENT — ANXIETY QUESTIONNAIRES: GAD7 TOTAL SCORE: 0

## 2018-11-19 ENCOUNTER — TRANSFERRED RECORDS (OUTPATIENT)
Dept: HEALTH INFORMATION MANAGEMENT | Facility: CLINIC | Age: 43
End: 2018-11-19

## 2019-01-07 NOTE — PROGRESS NOTES
SUBJECTIVE:   Bucky Franco is a 43 year old male who presents to clinic today for the following health issues:      Diabetes Follow-up      Patient is checking blood sugars: not at all    Diabetic concerns: None     Symptoms of hypoglycemia (low blood sugar): none     Paresthesias (numbness or burning in feet) or sores: Yes Tingling, numbness and throbbing sometimes     Date of last diabetic eye exam: last month (December)    BP Readings from Last 2 Encounters:   01/10/19 150/90   10/31/18 140/80     Hemoglobin A1C (%)   Date Value   10/11/2018 10.7 (H)     LDL Cholesterol Calculated (mg/dL)   Date Value   10/11/2018     Cannot estimate LDL when triglyceride exceeds 400 mg/dL     He has started walking.      Wt Readings from Last 5 Encounters:   01/10/19 139.7 kg (308 lb)   10/31/18 137 kg (302 lb)   10/11/18 139.4 kg (307 lb 6.4 oz)   03/31/15 (!) 147.4 kg (325 lb)   03/13/15 (!) 148.8 kg (328 lb)     Diabetes Management Resources  Hypertension Follow-up      Outpatient blood pressures are not being checked.    Low Salt Diet: no added salt      Amount of exercise or physical activity: None    Problems taking medications regularly: No    Medication side effects: none    Diet: regular (no restrictions)      BP Readings from Last 6 Encounters:   01/10/19 150/78   10/31/18 140/80   10/11/18 (!) 138/98   03/31/15 140/90   03/13/15 (!) 134/98   05/03/13 134/80       Bucky was fasting today and did not take his blood pressure medications    -------------------------------------    Problem list and histories reviewed & adjusted, as indicated.  Additional history: as documented    Patient Active Problem List   Diagnosis     Health examination of defined subpopulation     Sinus headache     Somatic dysfunction of cervical region     Headache     Morbid obesity (H)     Type 2 diabetes mellitus without complication, without long-term current use of insulin (H)     Encounter for diabetic foot exam (H)     Benign essential  hypertension     Past Surgical History:   Procedure Laterality Date     ADENOIDECTOMY       APPENDECTOMY       CIRCUMCISION       lap band       Onychomycoses       TONSILLECTOMY         Social History     Tobacco Use     Smoking status: Never Smoker     Smokeless tobacco: Former User     Types: Snuff   Substance Use Topics     Alcohol use: Yes     Comment: Rarely     Family History   Problem Relation Age of Onset     Hypertension Mother      Rheumatoid Arthritis Mother      Hyperlipidemia Father      Myocardial Infarction Paternal Grandfather      Diabetes Other      Asthma No family hx of            Reviewed and updated as needed this visit by clinical staff  Tobacco  Allergies  Meds  Med Hx  Surg Hx  Fam Hx  Soc Hx      Reviewed and updated as needed this visit by Provider         ROS:  Constitutional, HEENT, cardiovascular, pulmonary, gi and gu systems are negative, except as otherwise noted.    OBJECTIVE:     /78 (BP Location: Left arm, Patient Position: Chair, Cuff Size: Adult Large)   Pulse 94   Temp 98.9  F (37.2  C) (Tympanic)   Resp 20   Wt 139.7 kg (308 lb)   SpO2 97%   BMI 44.19 kg/m    Body mass index is 44.19 kg/m .  GENERAL: healthy, alert and no distress  EYES: Eyes grossly normal to inspection  RESP: lungs clear to auscultation - no rales, rhonchi or wheezes  CV: regular rate and rhythm, normal S1 S2, no S3 or S4, no murmur, click or rub, no peripheral edema and peripheral pulses strong  ABDOMEN: soft, nontender, no hepatosplenomegaly, no masses and bowel sounds normal  MS: no gross musculoskeletal defects noted, no edema  PSYCH: mentation appears normal, affect normal/bright  Skin;  There is a 3 mm lesion at the posterior neck with some mile induration and erythema        Diagnostic Test Results:  Results for orders placed or performed in visit on 01/10/19 (from the past 24 hour(s))   Lipid Profile (Chol, Trig, HDL, LDL calc)   Result Value Ref Range    Cholesterol 132 <200 mg/dL     Triglycerides 234 (H) <150 mg/dL    HDL Cholesterol 35 (L) >39 mg/dL    LDL Cholesterol Calculated 50 <100 mg/dL    Non HDL Cholesterol 97 <130 mg/dL   CK total   Result Value Ref Range    CK Total 57 30 - 300 U/L   Comprehensive metabolic panel   Result Value Ref Range    Sodium 137 133 - 144 mmol/L    Potassium 3.8 3.4 - 5.3 mmol/L    Chloride 102 94 - 109 mmol/L    Carbon Dioxide 27 20 - 32 mmol/L    Anion Gap 8 3 - 14 mmol/L    Glucose 243 (H) 70 - 99 mg/dL    Urea Nitrogen 13 7 - 30 mg/dL    Creatinine 0.62 (L) 0.66 - 1.25 mg/dL    GFR Estimate >90 >60 mL/min/[1.73_m2]    GFR Estimate If Black >90 >60 mL/min/[1.73_m2]    Calcium 8.6 8.5 - 10.1 mg/dL    Bilirubin Total 0.6 0.2 - 1.3 mg/dL    Albumin 3.7 3.4 - 5.0 g/dL    Protein Total 7.4 6.8 - 8.8 g/dL    Alkaline Phosphatase 150 40 - 150 U/L    ALT 44 0 - 70 U/L    AST 26 0 - 45 U/L   Hemoglobin A1c   Result Value Ref Range    Hemoglobin A1C 9.2 (H) 0 - 5.6 %     Recent Labs   Lab Test 01/10/19  0942 10/11/18  1245   CHOL 132 221*   HDL 35* 26*   LDL 50 Cannot estimate LDL when triglyceride exceeds 400 mg/dL   TRIG 234* 891*       ASSESSMENT/PLAN:   (I10) Benign essential hypertension  (primary encounter diagnosis)  Comment: Not at goal today due to his misunderstanding that fasting is to with hold food and not medication.  Plan:   He will continue lisinopril 20 mg daily.    (E11.9) Type 2 diabetes mellitus without complication, without long-term current use of insulin (H)  Comment: Improved but less than expected.  He is on an ACE inhibitor and a STATIN  Plan:   He will continue metformin 1000 mg BID and add Amaryl 2 mg BID                FUTURE APPOINTMENTS:       - Follow-up visit in 3 months with labs first    Beltran Beal DO, DO  [facility] [place]

## 2019-01-10 ENCOUNTER — OFFICE VISIT (OUTPATIENT)
Dept: INTERNAL MEDICINE | Facility: OTHER | Age: 44
End: 2019-01-10
Attending: INTERNAL MEDICINE
Payer: COMMERCIAL

## 2019-01-10 VITALS
HEART RATE: 94 BPM | OXYGEN SATURATION: 97 % | WEIGHT: 308 LBS | DIASTOLIC BLOOD PRESSURE: 78 MMHG | RESPIRATION RATE: 20 BRPM | TEMPERATURE: 98.9 F | SYSTOLIC BLOOD PRESSURE: 150 MMHG | BODY MASS INDEX: 44.19 KG/M2

## 2019-01-10 DIAGNOSIS — E11.42 TYPE 2 DIABETES MELLITUS WITH DIABETIC POLYNEUROPATHY, WITHOUT LONG-TERM CURRENT USE OF INSULIN (H): ICD-10-CM

## 2019-01-10 DIAGNOSIS — L73.9 FOLLICULITIS: ICD-10-CM

## 2019-01-10 DIAGNOSIS — E11.9 TYPE 2 DIABETES MELLITUS WITHOUT COMPLICATION, WITHOUT LONG-TERM CURRENT USE OF INSULIN (H): ICD-10-CM

## 2019-01-10 DIAGNOSIS — I10 BENIGN ESSENTIAL HYPERTENSION: Primary | ICD-10-CM

## 2019-01-10 LAB
ALBUMIN SERPL-MCNC: 3.7 G/DL (ref 3.4–5)
ALP SERPL-CCNC: 150 U/L (ref 40–150)
ALT SERPL W P-5'-P-CCNC: 44 U/L (ref 0–70)
ANION GAP SERPL CALCULATED.3IONS-SCNC: 8 MMOL/L (ref 3–14)
AST SERPL W P-5'-P-CCNC: 26 U/L (ref 0–45)
BILIRUB SERPL-MCNC: 0.6 MG/DL (ref 0.2–1.3)
BUN SERPL-MCNC: 13 MG/DL (ref 7–30)
CALCIUM SERPL-MCNC: 8.6 MG/DL (ref 8.5–10.1)
CHLORIDE SERPL-SCNC: 102 MMOL/L (ref 94–109)
CHOLEST SERPL-MCNC: 132 MG/DL
CK SERPL-CCNC: 57 U/L (ref 30–300)
CO2 SERPL-SCNC: 27 MMOL/L (ref 20–32)
CREAT SERPL-MCNC: 0.62 MG/DL (ref 0.66–1.25)
EST. AVERAGE GLUCOSE BLD GHB EST-MCNC: 217 MG/DL
GFR SERPL CREATININE-BSD FRML MDRD: >90 ML/MIN/{1.73_M2}
GLUCOSE SERPL-MCNC: 243 MG/DL (ref 70–99)
HBA1C MFR BLD: 9.2 % (ref 0–5.6)
HDLC SERPL-MCNC: 35 MG/DL
LDLC SERPL CALC-MCNC: 50 MG/DL
NONHDLC SERPL-MCNC: 97 MG/DL
POTASSIUM SERPL-SCNC: 3.8 MMOL/L (ref 3.4–5.3)
PROT SERPL-MCNC: 7.4 G/DL (ref 6.8–8.8)
SODIUM SERPL-SCNC: 137 MMOL/L (ref 133–144)
TRIGL SERPL-MCNC: 234 MG/DL

## 2019-01-10 PROCEDURE — 99214 OFFICE O/P EST MOD 30 MIN: CPT | Performed by: INTERNAL MEDICINE

## 2019-01-10 PROCEDURE — 83036 HEMOGLOBIN GLYCOSYLATED A1C: CPT | Performed by: INTERNAL MEDICINE

## 2019-01-10 PROCEDURE — 36415 COLL VENOUS BLD VENIPUNCTURE: CPT | Performed by: INTERNAL MEDICINE

## 2019-01-10 PROCEDURE — 80061 LIPID PANEL: CPT | Performed by: INTERNAL MEDICINE

## 2019-01-10 PROCEDURE — 80053 COMPREHEN METABOLIC PANEL: CPT | Performed by: INTERNAL MEDICINE

## 2019-01-10 PROCEDURE — 82550 ASSAY OF CK (CPK): CPT | Performed by: INTERNAL MEDICINE

## 2019-01-10 RX ORDER — METFORMIN HCL 500 MG
1000 TABLET, EXTENDED RELEASE 24 HR ORAL 2 TIMES DAILY WITH MEALS
Qty: 360 TABLET | Refills: 3 | Status: SHIPPED | OUTPATIENT
Start: 2019-01-10 | End: 2019-10-02

## 2019-01-10 RX ORDER — SULFAMETHOXAZOLE/TRIMETHOPRIM 800-160 MG
1 TABLET ORAL 2 TIMES DAILY
Qty: 24 TABLET | Refills: 0 | Status: SHIPPED | OUTPATIENT
Start: 2019-01-10 | End: 2019-04-17

## 2019-01-10 RX ORDER — GLIMEPIRIDE 2 MG/1
2 TABLET ORAL 2 TIMES DAILY WITH MEALS
Qty: 180 TABLET | Refills: 3 | Status: SHIPPED | OUTPATIENT
Start: 2019-01-10 | End: 2020-01-03

## 2019-01-10 ASSESSMENT — PATIENT HEALTH QUESTIONNAIRE - PHQ9
5. POOR APPETITE OR OVEREATING: NOT AT ALL
SUM OF ALL RESPONSES TO PHQ QUESTIONS 1-9: 0

## 2019-01-10 ASSESSMENT — ANXIETY QUESTIONNAIRES
GAD7 TOTAL SCORE: 0
5. BEING SO RESTLESS THAT IT IS HARD TO SIT STILL: NOT AT ALL
6. BECOMING EASILY ANNOYED OR IRRITABLE: NOT AT ALL
1. FEELING NERVOUS, ANXIOUS, OR ON EDGE: NOT AT ALL
3. WORRYING TOO MUCH ABOUT DIFFERENT THINGS: NOT AT ALL
7. FEELING AFRAID AS IF SOMETHING AWFUL MIGHT HAPPEN: NOT AT ALL
2. NOT BEING ABLE TO STOP OR CONTROL WORRYING: NOT AT ALL

## 2019-01-10 ASSESSMENT — PAIN SCALES - GENERAL: PAINLEVEL: NO PAIN (0)

## 2019-01-10 NOTE — NURSING NOTE
"Chief Complaint   Patient presents with     Hypertension     Diabetes       Initial /90 (BP Location: Left arm, Patient Position: Chair, Cuff Size: Adult Large)   Pulse 94   Temp 98.9  F (37.2  C) (Tympanic)   Resp 20   Wt 139.7 kg (308 lb)   SpO2 97%   BMI 44.19 kg/m   Estimated body mass index is 44.19 kg/m  as calculated from the following:    Height as of 10/31/18: 1.778 m (5' 10\").    Weight as of this encounter: 139.7 kg (308 lb).  Medication Reconciliation: complete    Celi Hayes LPN    Recheck /78  "

## 2019-01-11 ASSESSMENT — ANXIETY QUESTIONNAIRES: GAD7 TOTAL SCORE: 0

## 2019-01-29 ENCOUNTER — TELEPHONE (OUTPATIENT)
Dept: PEDIATRICS | Facility: OTHER | Age: 44
End: 2019-01-29

## 2019-01-29 NOTE — TELEPHONE ENCOUNTER
Called and left a detailed message stating that the patient needed to come in for a nurse only visit to recheck BP. Ashley A. Lechevalier, LPN on 1/29/2019 at 3:07 PM

## 2019-01-31 ENCOUNTER — ALLIED HEALTH/NURSE VISIT (OUTPATIENT)
Dept: FAMILY MEDICINE | Facility: OTHER | Age: 44
End: 2019-01-31
Attending: INTERNAL MEDICINE
Payer: COMMERCIAL

## 2019-01-31 VITALS — DIASTOLIC BLOOD PRESSURE: 88 MMHG | SYSTOLIC BLOOD PRESSURE: 142 MMHG

## 2019-01-31 DIAGNOSIS — Z01.30 BLOOD PRESSURE CHECK: Primary | ICD-10-CM

## 2019-01-31 DIAGNOSIS — I10 BENIGN ESSENTIAL HYPERTENSION: ICD-10-CM

## 2019-01-31 DIAGNOSIS — E11.42 TYPE 2 DIABETES MELLITUS WITH DIABETIC POLYNEUROPATHY, WITHOUT LONG-TERM CURRENT USE OF INSULIN (H): ICD-10-CM

## 2019-01-31 PROCEDURE — 99207 ZZC NO CHARGE NURSE ONLY: CPT

## 2019-01-31 RX ORDER — LISINOPRIL 30 MG/1
30 TABLET ORAL DAILY
Qty: 90 TABLET | Refills: 1 | Status: SHIPPED | OUTPATIENT
Start: 2019-01-31 | End: 2019-08-16

## 2019-04-04 NOTE — PROGRESS NOTES
SUBJECTIVE:   Bucky Franco is a 43 year old male who presents to clinic today for the following health issues:      Diabetes Follow-up      Patient is checking blood sugars: not at all    Diabetic concerns: None     Symptoms of hypoglycemia (low blood sugar): none     Paresthesias (numbness or burning in feet) or sores: Yes numbness and burning on both feet     Date of last diabetic eye exam: 11/2018    BP Readings from Last 2 Encounters:   04/12/19 (!) 160/100   01/31/19 142/88     Hemoglobin A1C (%)   Date Value   01/10/2019 9.2 (H)   10/11/2018 10.7 (H)     LDL Cholesterol Calculated (mg/dL)   Date Value   01/10/2019 50   10/11/2018     Cannot estimate LDL when triglyceride exceeds 400 mg/dL     He denies any polyuria or polydipsia.  He gets up once per night to urinate on rare occasion.  [facility]    Hypertension Follow-up      Outpatient blood pressures are not being checked.    Low Salt Diet: no added salt      Amount of exercise or physical activity: 2-3 days/week for an average of greater than 60 minutes    Problems taking medications regularly: No    Medication side effects: none    Diet: regular (no restrictions)      He denies any dizziness.  He denies shortness of breath, cough, wheezing, chest pain or leg edema       He does have posterior headaches due to neck tightness.  He has once per week headaches lasting a short period if he takes aspirin otherwise they last all day with relief ofsleeping      BP Readings from Last 6 Encounters:   04/12/19 150/88   01/31/19 142/88   01/10/19 150/78   10/31/18 140/80   10/11/18 (!) 138/98   03/31/15 140/90     -------------------------------------    Problem list and histories reviewed & adjusted, as indicated.  Additional history: as documented    Patient Active Problem List   Diagnosis     Health examination of defined subpopulation     Sinus headache     Somatic dysfunction of cervical region     Headache     Morbid obesity (H)     Type  2 diabetes mellitus without complication, without long-term current use of insulin (H)     Encounter for diabetic foot exam (H)     Benign essential hypertension     Past Surgical History:   Procedure Laterality Date     ADENOIDECTOMY       APPENDECTOMY       CIRCUMCISION       lap band       Onychomycoses       TONSILLECTOMY         Social History     Tobacco Use     Smoking status: Never Smoker     Smokeless tobacco: Former User     Types: Snuff   Substance Use Topics     Alcohol use: Yes     Comment: Rarely     Family History   Problem Relation Age of Onset     Hypertension Mother      Rheumatoid Arthritis Mother      Hyperlipidemia Father      Myocardial Infarction Paternal Grandfather      Diabetes Other      Asthma No family hx of            Reviewed and updated as needed this visit by clinical staff  Tobacco  Allergies  Meds  Med Hx  Surg Hx  Fam Hx  Soc Hx      Reviewed and updated as needed this visit by Provider         ROS:  Constitutional, HEENT, cardiovascular, pulmonary, gi and gu systems are negative, except as otherwise noted.    OBJECTIVE:     /90 (BP Location: Right arm, Patient Position: Sitting, Cuff Size: Adult Large)   Pulse 81   Temp 98  F (36.7  C) (Tympanic)   Wt 144.7 kg (319 lb)   SpO2 97%   BMI 45.77 kg/m    Body mass index is 45.77 kg/m .  GENERAL: healthy, alert and no distress  NECK: no adenopathy, no asymmetry, masses, or scars and thyroid normal to palpation  RESP: lungs clear to auscultation - no rales, rhonchi or wheezes  CV: regular rate and rhythm, normal S1 S2, no S3 or S4, no murmur, click or rub, no peripheral edema and peripheral pulses strong  ABDOMEN: soft, nontender, no hepatosplenomegaly, no masses and bowel sounds normal  ABDOMEN: no bruits heard  MS: no gross musculoskeletal defects noted, no edema  PSYCH: mentation appears normal, affect normal/bright    Diagnostic Test Results:  Results for orders placed or performed in visit on 04/12/19 (from the  past 24 hour(s))   Comprehensive metabolic panel   Result Value Ref Range    Sodium 140 133 - 144 mmol/L    Potassium 3.8 3.4 - 5.3 mmol/L    Chloride 105 94 - 109 mmol/L    Carbon Dioxide 29 20 - 32 mmol/L    Anion Gap 6 3 - 14 mmol/L    Glucose 112 (H) 70 - 99 mg/dL    Urea Nitrogen 12 7 - 30 mg/dL    Creatinine 0.74 0.66 - 1.25 mg/dL    GFR Estimate >90 >60 mL/min/[1.73_m2]    GFR Estimate If Black >90 >60 mL/min/[1.73_m2]    Calcium 8.8 8.5 - 10.1 mg/dL    Bilirubin Total 0.5 0.2 - 1.3 mg/dL    Albumin 4.0 3.4 - 5.0 g/dL    Protein Total 7.5 6.8 - 8.8 g/dL    Alkaline Phosphatase 104 40 - 150 U/L    ALT 38 0 - 70 U/L    AST 21 0 - 45 U/L   Hemoglobin A1c   Result Value Ref Range    Hemoglobin A1C 7.1 (H) 0 - 5.6 %     Lab Results   Component Value Date    A1C 7.1 04/12/2019    A1C 9.2 01/10/2019    A1C 10.7 10/11/2018       ASSESSMENT/PLAN:   (E11.9) Type 2 diabetes mellitus without complication, without long-term current use of insulin (H)  Comment: Improved and getting close to his goal of A1c less than 7.0 %.  His blood pressure is not controlled.  His renal function is good.  He is on and ACE inhibitor and a STATIN  Plan:   He will continue Metformin 1000 mg BID and Amaryl 2 mg BID.  He will continue ASA 81 mg     (I10) Benign essential hypertension  Comment: Not at goal  Plan:   Add hydrochlorothiazide (HYDRODIURIL) 12.5 MG tablet.  He will continue lisinopril 30 mg daily.    (E66.01) Morbid obesity (H)  (primary encounter diagnosis)  Comment: Bucky is on board with regular exercise which he has just started  Plan:   He will walk on his treadmill daily for at least 10 minutes.      FUTURE APPOINTMENTS:       - Follow-up visit in 6 months for DM and HTN and in 3 weeks for tension headaches with OMT treatment.    Beltran Beal DO, DO  [facility] - [place]

## 2019-04-12 ENCOUNTER — OFFICE VISIT (OUTPATIENT)
Dept: INTERNAL MEDICINE | Facility: OTHER | Age: 44
End: 2019-04-12
Attending: INTERNAL MEDICINE
Payer: COMMERCIAL

## 2019-04-12 VITALS
TEMPERATURE: 98 F | DIASTOLIC BLOOD PRESSURE: 88 MMHG | HEART RATE: 81 BPM | BODY MASS INDEX: 45.77 KG/M2 | SYSTOLIC BLOOD PRESSURE: 150 MMHG | WEIGHT: 315 LBS | OXYGEN SATURATION: 97 %

## 2019-04-12 DIAGNOSIS — I10 BENIGN ESSENTIAL HYPERTENSION: ICD-10-CM

## 2019-04-12 DIAGNOSIS — E66.01 MORBID OBESITY (H): Primary | ICD-10-CM

## 2019-04-12 DIAGNOSIS — E11.9 TYPE 2 DIABETES MELLITUS WITHOUT COMPLICATION, WITHOUT LONG-TERM CURRENT USE OF INSULIN (H): ICD-10-CM

## 2019-04-12 LAB
ALBUMIN SERPL-MCNC: 4 G/DL (ref 3.4–5)
ALP SERPL-CCNC: 104 U/L (ref 40–150)
ALT SERPL W P-5'-P-CCNC: 38 U/L (ref 0–70)
ANION GAP SERPL CALCULATED.3IONS-SCNC: 6 MMOL/L (ref 3–14)
AST SERPL W P-5'-P-CCNC: 21 U/L (ref 0–45)
BILIRUB SERPL-MCNC: 0.5 MG/DL (ref 0.2–1.3)
BUN SERPL-MCNC: 12 MG/DL (ref 7–30)
CALCIUM SERPL-MCNC: 8.8 MG/DL (ref 8.5–10.1)
CHLORIDE SERPL-SCNC: 105 MMOL/L (ref 94–109)
CO2 SERPL-SCNC: 29 MMOL/L (ref 20–32)
CREAT SERPL-MCNC: 0.74 MG/DL (ref 0.66–1.25)
GFR SERPL CREATININE-BSD FRML MDRD: >90 ML/MIN/{1.73_M2}
GLUCOSE SERPL-MCNC: 112 MG/DL (ref 70–99)
HBA1C MFR BLD: 7.1 % (ref 0–5.6)
POTASSIUM SERPL-SCNC: 3.8 MMOL/L (ref 3.4–5.3)
PROT SERPL-MCNC: 7.5 G/DL (ref 6.8–8.8)
SODIUM SERPL-SCNC: 140 MMOL/L (ref 133–144)

## 2019-04-12 PROCEDURE — 36415 COLL VENOUS BLD VENIPUNCTURE: CPT | Performed by: INTERNAL MEDICINE

## 2019-04-12 PROCEDURE — 99214 OFFICE O/P EST MOD 30 MIN: CPT | Performed by: INTERNAL MEDICINE

## 2019-04-12 PROCEDURE — 80053 COMPREHEN METABOLIC PANEL: CPT | Performed by: INTERNAL MEDICINE

## 2019-04-12 PROCEDURE — 83036 HEMOGLOBIN GLYCOSYLATED A1C: CPT | Performed by: INTERNAL MEDICINE

## 2019-04-12 RX ORDER — HYDROCHLOROTHIAZIDE 12.5 MG/1
12.5 TABLET ORAL DAILY
Qty: 90 TABLET | Refills: 1 | Status: SHIPPED | OUTPATIENT
Start: 2019-04-12 | End: 2019-05-23 | Stop reason: SINTOL

## 2019-04-12 ASSESSMENT — PAIN SCALES - GENERAL: PAINLEVEL: MILD PAIN (3)

## 2019-04-12 NOTE — NURSING NOTE
"Chief Complaint   Patient presents with     Diabetes     Hypertension       Initial BP (!) 160/100 (BP Location: Right arm, Patient Position: Sitting, Cuff Size: Adult Large)   Pulse 81   Temp 98  F (36.7  C) (Tympanic)   Wt 144.7 kg (319 lb)   SpO2 97%   BMI 45.77 kg/m   Estimated body mass index is 45.77 kg/m  as calculated from the following:    Height as of 10/31/18: 1.778 m (5' 10\").    Weight as of this encounter: 144.7 kg (319 lb).  Medication Reconciliation: complete    Hanny Boucher LPN  "

## 2019-04-15 ENCOUNTER — TELEPHONE (OUTPATIENT)
Dept: PEDIATRICS | Facility: OTHER | Age: 44
End: 2019-04-15

## 2019-04-15 NOTE — TELEPHONE ENCOUNTER
I talked with Bucky and we both feel that he may have had some illness going on.  He will hod the HCTZ until Thursday and restart the medication. He will call and update us on Friday.

## 2019-04-17 ENCOUNTER — OFFICE VISIT (OUTPATIENT)
Dept: FAMILY MEDICINE | Facility: OTHER | Age: 44
End: 2019-04-17
Attending: NURSE PRACTITIONER
Payer: COMMERCIAL

## 2019-04-17 ENCOUNTER — TELEPHONE (OUTPATIENT)
Dept: INTERNAL MEDICINE | Facility: OTHER | Age: 44
End: 2019-04-17

## 2019-04-17 ENCOUNTER — HOSPITAL ENCOUNTER (OUTPATIENT)
Dept: CT IMAGING | Facility: HOSPITAL | Age: 44
Discharge: HOME | End: 2019-04-17
Attending: NURSE PRACTITIONER | Admitting: NURSE PRACTITIONER
Payer: COMMERCIAL

## 2019-04-17 ENCOUNTER — ANCILLARY PROCEDURE (OUTPATIENT)
Dept: GENERAL RADIOLOGY | Facility: OTHER | Age: 44
End: 2019-04-17
Attending: NURSE PRACTITIONER
Payer: COMMERCIAL

## 2019-04-17 VITALS
SYSTOLIC BLOOD PRESSURE: 172 MMHG | HEART RATE: 97 BPM | OXYGEN SATURATION: 98 % | BODY MASS INDEX: 44.1 KG/M2 | TEMPERATURE: 99 F | RESPIRATION RATE: 20 BRPM | DIASTOLIC BLOOD PRESSURE: 90 MMHG | HEIGHT: 71 IN | WEIGHT: 315 LBS

## 2019-04-17 DIAGNOSIS — R10.84 ABDOMINAL PAIN, GENERALIZED: ICD-10-CM

## 2019-04-17 DIAGNOSIS — R10.84 ABDOMINAL PAIN, GENERALIZED: Primary | ICD-10-CM

## 2019-04-17 LAB
ALBUMIN SERPL-MCNC: 3.6 G/DL (ref 3.4–5)
ALP SERPL-CCNC: 102 U/L (ref 40–150)
ALT SERPL W P-5'-P-CCNC: 31 U/L (ref 0–70)
AMYLASE SERPL-CCNC: 52 U/L (ref 30–110)
ANION GAP SERPL CALCULATED.3IONS-SCNC: 7 MMOL/L (ref 3–14)
AST SERPL W P-5'-P-CCNC: 15 U/L (ref 0–45)
BASOPHILS # BLD AUTO: 0.1 10E9/L (ref 0–0.2)
BASOPHILS NFR BLD AUTO: 0.7 %
BILIRUB SERPL-MCNC: 0.4 MG/DL (ref 0.2–1.3)
BUN SERPL-MCNC: 18 MG/DL (ref 7–30)
CALCIUM SERPL-MCNC: 9.4 MG/DL (ref 8.5–10.1)
CHLORIDE SERPL-SCNC: 102 MMOL/L (ref 94–109)
CO2 SERPL-SCNC: 31 MMOL/L (ref 20–32)
CREAT SERPL-MCNC: 0.84 MG/DL (ref 0.66–1.25)
CRP SERPL-MCNC: 133 MG/L (ref 0–8)
DIFFERENTIAL METHOD BLD: ABNORMAL
EOSINOPHIL # BLD AUTO: 0.5 10E9/L (ref 0–0.7)
EOSINOPHIL NFR BLD AUTO: 3.3 %
ERYTHROCYTE [DISTWIDTH] IN BLOOD BY AUTOMATED COUNT: 12.7 % (ref 10–15)
GFR SERPL CREATININE-BSD FRML MDRD: >90 ML/MIN/{1.73_M2}
GLUCOSE SERPL-MCNC: 129 MG/DL (ref 70–99)
HCT VFR BLD AUTO: 40.8 % (ref 40–53)
HGB BLD-MCNC: 13.8 G/DL (ref 13.3–17.7)
IMM GRANULOCYTES # BLD: 0.1 10E9/L (ref 0–0.4)
IMM GRANULOCYTES NFR BLD: 0.7 %
LIPASE SERPL-CCNC: 240 U/L (ref 73–393)
LYMPHOCYTES # BLD AUTO: 3.1 10E9/L (ref 0.8–5.3)
LYMPHOCYTES NFR BLD AUTO: 19.4 %
MCH RBC QN AUTO: 29.3 PG (ref 26.5–33)
MCHC RBC AUTO-ENTMCNC: 33.8 G/DL (ref 31.5–36.5)
MCV RBC AUTO: 87 FL (ref 78–100)
MONOCYTES # BLD AUTO: 1.3 10E9/L (ref 0–1.3)
MONOCYTES NFR BLD AUTO: 7.8 %
NEUTROPHILS # BLD AUTO: 10.9 10E9/L (ref 1.6–8.3)
NEUTROPHILS NFR BLD AUTO: 68.1 %
NRBC # BLD AUTO: 0 10*3/UL
NRBC BLD AUTO-RTO: 0 /100
PLATELET # BLD AUTO: 327 10E9/L (ref 150–450)
POTASSIUM SERPL-SCNC: 3.7 MMOL/L (ref 3.4–5.3)
PROT SERPL-MCNC: 7.4 G/DL (ref 6.8–8.8)
RBC # BLD AUTO: 4.71 10E12/L (ref 4.4–5.9)
SODIUM SERPL-SCNC: 140 MMOL/L (ref 133–144)
WBC # BLD AUTO: 16 10E9/L (ref 4–11)

## 2019-04-17 PROCEDURE — 25500064 ZZH RX 255 OP 636: Performed by: RADIOLOGY

## 2019-04-17 PROCEDURE — 83690 ASSAY OF LIPASE: CPT | Performed by: NURSE PRACTITIONER

## 2019-04-17 PROCEDURE — 82150 ASSAY OF AMYLASE: CPT | Performed by: NURSE PRACTITIONER

## 2019-04-17 PROCEDURE — 86140 C-REACTIVE PROTEIN: CPT | Performed by: NURSE PRACTITIONER

## 2019-04-17 PROCEDURE — 85025 COMPLETE CBC W/AUTO DIFF WBC: CPT | Performed by: NURSE PRACTITIONER

## 2019-04-17 PROCEDURE — 80053 COMPREHEN METABOLIC PANEL: CPT | Performed by: NURSE PRACTITIONER

## 2019-04-17 PROCEDURE — 74019 RADEX ABDOMEN 2 VIEWS: CPT | Mod: TC

## 2019-04-17 PROCEDURE — 36415 COLL VENOUS BLD VENIPUNCTURE: CPT | Performed by: NURSE PRACTITIONER

## 2019-04-17 PROCEDURE — 99214 OFFICE O/P EST MOD 30 MIN: CPT | Performed by: NURSE PRACTITIONER

## 2019-04-17 PROCEDURE — 74177 CT ABD & PELVIS W/CONTRAST: CPT | Mod: TC

## 2019-04-17 RX ORDER — IOPAMIDOL 612 MG/ML
100 INJECTION, SOLUTION INTRAVASCULAR ONCE
Status: COMPLETED | OUTPATIENT
Start: 2019-04-17 | End: 2019-04-17

## 2019-04-17 RX ADMIN — IOPAMIDOL 100 ML: 612 INJECTION, SOLUTION INTRAVENOUS at 17:22

## 2019-04-17 ASSESSMENT — MIFFLIN-ST. JEOR: SCORE: 2378.64

## 2019-04-17 ASSESSMENT — PAIN SCALES - GENERAL: PAINLEVEL: MODERATE PAIN (4)

## 2019-04-17 NOTE — PROGRESS NOTES
SUBJECTIVE:   Bucky Franco is a 43 year old male who presents to clinic today for the following   health issues:      Constipation      Duration: started last Saturday 4/13    Description:       Frequency of bowel movements: normally every day, however only had 1 bm in the last 4 days       Consistency of stool: solid    Intensity:  4/10    Accompanying signs and symptoms: abdominal and mid back pain        Abdominal pain: YES, epigastric and wraps around to the back        Rectal pain: no        Blood in stool: no        Nausea/vomitting: no     History:        Similar problems in past: no     Precipitating or alleviating factors: has tried mirilax on Monday and took 8 ducolax over the past two days       Medications worsening symptoms: no     Therapies tried and outcome: see above, no results       Chronic laxative use: no   Does have the lap band. Denies any problems in the past with the lap band  Denies any history of constipation.   Did recently start hydrochlorothiazide and developed abdominal discomfort shortly after starting the hydrochlorothiazide. He took it for a couple days and has stopped. He did updated Dr Beal and agreed with plan       Additional history: as documented    Reviewed  and updated as needed this visit by clinical staff         Reviewed and updated as needed this visit by Provider         Patient Active Problem List   Diagnosis     Health examination of defined subpopulation     Sinus headache     Somatic dysfunction of cervical region     Headache     Morbid obesity (H)     Type 2 diabetes mellitus without complication, without long-term current use of insulin (H)     Encounter for diabetic foot exam (H)     Benign essential hypertension     Past Surgical History:   Procedure Laterality Date     ADENOIDECTOMY       APPENDECTOMY       CIRCUMCISION       lap band       Onychomycoses       TONSILLECTOMY         Social History     Tobacco Use     Smoking status: Never Smoker      "Smokeless tobacco: Former User     Types: Snuff   Substance Use Topics     Alcohol use: Yes     Comment: Rarely     Family History   Problem Relation Age of Onset     Hypertension Mother      Rheumatoid Arthritis Mother      Hyperlipidemia Father      Myocardial Infarction Paternal Grandfather      Diabetes Other      Asthma No family hx of          Current Outpatient Medications   Medication Sig Dispense Refill     aspirin 81 MG tablet Take 1 tablet (81 mg) by mouth daily 90 tablet 3     fluticasone (FLONASE) 50 MCG/ACT spray Spray 2 sprays into both nostrils daily 3 Bottle 3     glimepiride (AMARYL) 2 MG tablet Take 1 tablet (2 mg) by mouth 2 times daily (with meals) 180 tablet 3     hydrochlorothiazide (HYDRODIURIL) 12.5 MG tablet Take 1 tablet (12.5 mg) by mouth daily (Patient not taking: Reported on 4/17/2019) 90 tablet 1     lisinopril (PRINIVIL/ZESTRIL) 30 MG tablet Take 1 tablet (30 mg) by mouth daily 90 tablet 1     metFORMIN (GLUCOPHAGE-XR) 500 MG 24 hr tablet Take 2 tablets (1,000 mg) by mouth 2 times daily (with meals) 360 tablet 3     rosuvastatin (CRESTOR) 10 MG tablet Take 1 tablet (10 mg) by mouth daily 90 tablet 3     No Known Allergies    ROS:  CONSTITUTIONAL:NEGATIVE for fever, chills, change in weight  INTEGUMENTARY/SKIN: NEGATIVE for worrisome rashes, moles or lesions  RESP:occasional SOB  CV: NEGATIVE for chest pain, palpitations or peripheral edema  GI: feels full faster, abdominal pain epigastric, gas or bloating and heartburn or reflux  : negative for dysuria, hematuria, decreased urinary stream, erectile dysfunction  MUSCULOSKELETAL: back, raps around from the abdomen  NEURO: NEGATIVE for weakness, dizziness or paresthesias  ENDOCRINE: constipation  PSYCHIATRIC: NEGATIVE for changes in mood or affect    OBJECTIVE:     /90   Pulse 97   Temp 99  F (37.2  C) (Tympanic)   Resp 20   Ht 1.803 m (5' 11\")   Wt 145.2 kg (320 lb)   SpO2 98%   BMI 44.63 kg/m    Body mass index is 44.63 " kg/m .   GENERAL: alert, no distress and obese  RESP: lungs clear to auscultation - no rales, rhonchi or wheezes  CV: regular rate and rhythm, normal S1 S2, no S3 or S4, no murmur, click or rub, no peripheral edema and peripheral pulses strong  ABDOMEN: tenderness epigastric, RUQ and LUQ and bowel sounds normal  SKIN: no suspicious lesions or rashes  PSYCH: mentation appears normal, affect normal/bright    Diagnostic Test Results:  Results for orders placed or performed in visit on 04/17/19 (from the past 24 hour(s))   Amylase   Result Value Ref Range    Amylase 52 30 - 110 U/L   CBC with platelets differential   Result Value Ref Range    WBC 16.0 (H) 4.0 - 11.0 10e9/L    RBC Count 4.71 4.4 - 5.9 10e12/L    Hemoglobin 13.8 13.3 - 17.7 g/dL    Hematocrit 40.8 40.0 - 53.0 %    MCV 87 78 - 100 fl    MCH 29.3 26.5 - 33.0 pg    MCHC 33.8 31.5 - 36.5 g/dL    RDW 12.7 10.0 - 15.0 %    Platelet Count 327 150 - 450 10e9/L    Diff Method Automated Method     % Neutrophils 68.1 %    % Lymphocytes 19.4 %    % Monocytes 7.8 %    % Eosinophils 3.3 %    % Basophils 0.7 %    % Immature Granulocytes 0.7 %    Nucleated RBCs 0 0 /100    Absolute Neutrophil 10.9 (H) 1.6 - 8.3 10e9/L    Absolute Lymphocytes 3.1 0.8 - 5.3 10e9/L    Absolute Monocytes 1.3 0.0 - 1.3 10e9/L    Absolute Eosinophils 0.5 0.0 - 0.7 10e9/L    Absolute Basophils 0.1 0.0 - 0.2 10e9/L    Abs Immature Granulocytes 0.1 0 - 0.4 10e9/L    Absolute Nucleated RBC 0.0    Comprehensive metabolic panel   Result Value Ref Range    Sodium 140 133 - 144 mmol/L    Potassium 3.7 3.4 - 5.3 mmol/L    Chloride 102 94 - 109 mmol/L    Carbon Dioxide 31 20 - 32 mmol/L    Anion Gap 7 3 - 14 mmol/L    Glucose 129 (H) 70 - 99 mg/dL    Urea Nitrogen 18 7 - 30 mg/dL    Creatinine 0.84 0.66 - 1.25 mg/dL    GFR Estimate >90 >60 mL/min/[1.73_m2]    GFR Estimate If Black >90 >60 mL/min/[1.73_m2]    Calcium 9.4 8.5 - 10.1 mg/dL    Bilirubin Total 0.4 0.2 - 1.3 mg/dL    Albumin 3.6 3.4 - 5.0  g/dL    Protein Total 7.4 6.8 - 8.8 g/dL    Alkaline Phosphatase 102 40 - 150 U/L    ALT 31 0 - 70 U/L    AST 15 0 - 45 U/L   Lipase   Result Value Ref Range    Lipase 240 73 - 393 U/L   CRP, inflammation   Result Value Ref Range    CRP Inflammation 133.0 (H) 0.0 - 8.0 mg/L     XR ABDOMEN 2 VW   4/17/2019 3:48 PM     History:Male,age  43 years, Abdominal pain, generalized     Comparison: None     FINDINGS: Two views are submitted. Moderate to large volume of stool  is seen within the colon. There is no evidence of free air or evidence  of obstruction.                                                                       IMPRESSION:  1.   Moderate to large volume of stool and nonspecific bowel gas  pattern. No evidence of apparent obstruction or free air.     LATRICE ESCOTO MD  ASSESSMENT/PLAN:     1. Abdominal pain, generalized  Discussed abdominal pain with Radiologist Dr Escoto and Surgeon Dr Guerrier.  XR does not look concerning. Dr Escoto suggested can do a CT due to symptoms with elevated WBC and CRP   - Amylase  - CBC with platelets differential  - Comprehensive metabolic panel  - Lipase  - CRP, inflammation  - XR ABDOMEN 2 VW (Clinic Performed); Future  - CT Abdomen Pelvis w Contrast; Future    Discussed trying something for constipation, but waiting until after CT. Plan to notify patient. Of results once available. Patient to follow up with Dr Beal tomorrow.      Discussed any increased pain, fevers, blood or concerning symptoms to go to the ER right away.         MARILIA Garay [facility] - [place]

## 2019-04-17 NOTE — TELEPHONE ENCOUNTER
8:44 AM    Reason for Call: OVERBOOK    Patient is having the following symptoms:constipation for 6 days.    The patient is requesting an appointment for constipation with Dr Beal.    Was an appointment offered for this call? Yes  If yes : Appointment type              Date to far out wants to be seen today or tomorrow    Preferred method for responding to this message: Telephone Call  What is your phone number ? [phone]    If we cannot reach you directly, may we leave a detailed response at the number you provided? Yes    Can this message wait until your PCP/provider returns, if unavailable today? Not applicable, Provider is in today    Kelly Shoen

## 2019-04-17 NOTE — NURSING NOTE
"Chief Complaint   Patient presents with     Constipation       Initial /90   Pulse 97   Temp 99  F (37.2  C) (Tympanic)   Resp 20   Ht 1.803 m (5' 11\")   Wt 145.2 kg (320 lb)   SpO2 98%   BMI 44.63 kg/m   Estimated body mass index is 44.63 kg/m  as calculated from the following:    Height as of this encounter: 1.803 m (5' 11\").    Weight as of this encounter: 145.2 kg (320 lb).  Medication Reconciliation: complete    Vicky Heck LPN    "

## 2019-04-17 NOTE — Clinical Note
Bucky presented with abdominal pain across the upper abdomen. Appears to be constipated, but with elevated CRP and WBC I am doing a CT tonight and he will follow up with you tomorrow. Carmenza CAPELLAN FNP-BCFamily Nurse Practitioner

## 2019-04-18 ENCOUNTER — OFFICE VISIT (OUTPATIENT)
Dept: INTERNAL MEDICINE | Facility: OTHER | Age: 44
End: 2019-04-18
Attending: INTERNAL MEDICINE
Payer: COMMERCIAL

## 2019-04-18 VITALS
SYSTOLIC BLOOD PRESSURE: 130 MMHG | WEIGHT: 315 LBS | TEMPERATURE: 98 F | OXYGEN SATURATION: 97 % | HEART RATE: 75 BPM | DIASTOLIC BLOOD PRESSURE: 90 MMHG | BODY MASS INDEX: 44.63 KG/M2

## 2019-04-18 DIAGNOSIS — R10.13 ABDOMINAL PAIN, EPIGASTRIC: Primary | ICD-10-CM

## 2019-04-18 PROCEDURE — 99213 OFFICE O/P EST LOW 20 MIN: CPT | Performed by: INTERNAL MEDICINE

## 2019-04-18 ASSESSMENT — PAIN SCALES - GENERAL: PAINLEVEL: MILD PAIN (3)

## 2019-04-18 NOTE — PROGRESS NOTES
SUBJECTIVE:   Bucky Franco is a 43 year old male who presents to clinic today for the following   health issues:      Abdominal Pain F/U      Duration: Ongoing since Saturday 4/13/19    Description (location/character/radiation): whole upper abdomen        Associated flank pain: yes    Intensity:  moderate    Accompanying signs and symptoms:        Fever/Chills: no        Gas/Bloating: YES       Nausea/vomitting: no        Diarrhea: no        Dysuria or Hematuria: no     History (previous similar pain/trauma/previous testing): no    Precipitating or alleviating factors:       Pain worse with eating/BM/urination: none       Pain relieved by BM: no     Therapies tried and outcome: None    LMP:  not applicable    Office Visit on 04/17/2019   Component Date Value Ref Range Status     Amylase 04/17/2019 52  30 - 110 U/L Final     WBC 04/17/2019 16.0* 4.0 - 11.0 10e9/L Final     RBC Count 04/17/2019 4.71  4.4 - 5.9 10e12/L Final     Hemoglobin 04/17/2019 13.8  13.3 - 17.7 g/dL Final     Hematocrit 04/17/2019 40.8  40.0 - 53.0 % Final     MCV 04/17/2019 87  78 - 100 fl Final     MCH 04/17/2019 29.3  26.5 - 33.0 pg Final     MCHC 04/17/2019 33.8  31.5 - 36.5 g/dL Final     RDW 04/17/2019 12.7  10.0 - 15.0 % Final     Platelet Count 04/17/2019 327  150 - 450 10e9/L Final     Diff Method 04/17/2019 Automated Method   Final     % Neutrophils 04/17/2019 68.1  % Final     % Lymphocytes 04/17/2019 19.4  % Final     % Monocytes 04/17/2019 7.8  % Final     % Eosinophils 04/17/2019 3.3  % Final     % Basophils 04/17/2019 0.7  % Final     % Immature Granulocytes 04/17/2019 0.7  % Final     Nucleated RBCs 04/17/2019 0  0 /100 Final     Absolute Neutrophil 04/17/2019 10.9* 1.6 - 8.3 10e9/L Final     Absolute Lymphocytes 04/17/2019 3.1  0.8 - 5.3 10e9/L Final     Absolute Monocytes 04/17/2019 1.3  0.0 - 1.3 10e9/L Final     Absolute Eosinophils 04/17/2019 0.5  0.0 - 0.7 10e9/L Final     Absolute Basophils 04/17/2019 0.1  0.0 - 0.2  10e9/L Final     Abs Immature Granulocytes 04/17/2019 0.1  0 - 0.4 10e9/L Final     Absolute Nucleated RBC 04/17/2019 0.0   Final     Sodium 04/17/2019 140  133 - 144 mmol/L Final     Potassium 04/17/2019 3.7  3.4 - 5.3 mmol/L Final     Chloride 04/17/2019 102  94 - 109 mmol/L Final     Carbon Dioxide 04/17/2019 31  20 - 32 mmol/L Final     Anion Gap 04/17/2019 7  3 - 14 mmol/L Final     Glucose 04/17/2019 129* 70 - 99 mg/dL Final     Urea Nitrogen 04/17/2019 18  7 - 30 mg/dL Final     Creatinine 04/17/2019 0.84  0.66 - 1.25 mg/dL Final     GFR Estimate 04/17/2019 >90  >60 mL/min/[1.73_m2] Final    Comment: Non  GFR Calc  Starting 12/18/2018, serum creatinine based estimated GFR (eGFR) will be   calculated using the Chronic Kidney Disease Epidemiology Collaboration   (CKD-EPI) equation.       GFR Estimate If Black 04/17/2019 >90  >60 mL/min/[1.73_m2] Final    Comment:  GFR Calc  Starting 12/18/2018, serum creatinine based estimated GFR (eGFR) will be   calculated using the Chronic Kidney Disease Epidemiology Collaboration   (CKD-EPI) equation.       Calcium 04/17/2019 9.4  8.5 - 10.1 mg/dL Final     Bilirubin Total 04/17/2019 0.4  0.2 - 1.3 mg/dL Final     Albumin 04/17/2019 3.6  3.4 - 5.0 g/dL Final     Protein Total 04/17/2019 7.4  6.8 - 8.8 g/dL Final     Alkaline Phosphatase 04/17/2019 102  40 - 150 U/L Final     ALT 04/17/2019 31  0 - 70 U/L Final     AST 04/17/2019 15  0 - 45 U/L Final     Lipase 04/17/2019 240  73 - 393 U/L Final     CRP Inflammation 04/17/2019 133.0* 0.0 - 8.0 mg/L Final     He had come off of his HCTZ which made no difference in his abdominal pain.  He reports that he had not had a bowel movement since the 13 th of the month.  He had some small episode of diarrhea today.  He denies any nausea.  His pain has been over he upper abdomen.  He denies nausea with eatin.          -------------------------------------    Additional history: as documented    Reviewed   and updated as needed this visit by clinical staff  Tobacco  Allergies  Meds  Med Hx  Surg Hx  Fam Hx  Soc Hx        Reviewed and updated as needed this visit by Provider         Patient Active Problem List   Diagnosis     Health examination of defined subpopulation     Sinus headache     Somatic dysfunction of cervical region     Headache     Morbid obesity (H)     Type 2 diabetes mellitus without complication, without long-term current use of insulin (H)     Encounter for diabetic foot exam (H)     Benign essential hypertension     Past Surgical History:   Procedure Laterality Date     ADENOIDECTOMY       APPENDECTOMY       CIRCUMCISION       lap band       Onychomycoses       TONSILLECTOMY         Social History     Tobacco Use     Smoking status: Never Smoker     Smokeless tobacco: Former User     Types: Snuff   Substance Use Topics     Alcohol use: Yes     Comment: Rarely     Family History   Problem Relation Age of Onset     Hypertension Mother      Rheumatoid Arthritis Mother      Hyperlipidemia Father      Myocardial Infarction Paternal Grandfather      Diabetes Other      Asthma No family hx of            ROS:  Constitutional, HEENT, cardiovascular, pulmonary, gi and gu systems are negative, except as otherwise noted.    OBJECTIVE:     /90 (BP Location: Right arm, Patient Position: Sitting, Cuff Size: Adult Large)   Pulse 75   Temp 98  F (36.7  C) (Tympanic)   Wt 145.2 kg (320 lb)   SpO2 97%   BMI 44.63 kg/m    Body mass index is 44.63 kg/m .  GENERAL: healthy, alert and no distress  NECK: no adenopathy, no asymmetry, masses, or scars and thyroid normal to palpation  RESP: lungs clear to auscultation - no rales, rhonchi or wheezes  CV: regular rate and rhythm, normal S1 S2, no S3 or S4, no murmur, click or rub, no peripheral edema and peripheral pulses strong  ABDOMEN: tenderness epigastric, bowel sounds normal, no palpable or pulsatile masses and no bruits heard  MS: no gross  musculoskeletal defects noted, no edema    Diagnostic Test Results:  Results for orders placed or performed during the hospital encounter of 04/17/19   CT Abdomen Pelvis w Contrast    Narrative    CT ABDOMEN PELVIS W CONTRAST    CLINICAL HISTORY: Male, age 43 years,  CRP elevated and WBC elevated;  Abdominal pain, generalized;    Comparison:  None.    TECHNIQUE:  CT was performed of the abdomen and pelvis with IV  contrast. Sagittal, coronal and axial reconstructions were reviewed.     FINDINGS:    Abdomen/Pelvis CT:  Lung Bases:  Mild dependent and peripheral atelectasis.    Esophagus/stomach: Lap band.    Liver:  Diffuse decrease in density.  Portal venous system: Patent.  Gallbladder: Normal.    Spleen: Spleen is enlarged measuring 15.7 cm in AP length.    Pancreas: Mild fat stranding about the head and uncinate process of  the pancreas. No necrosis.    Adrenal Glands: 3.8 cm left adrenal gland myolipoma.    Kidneys: Normal.  Ureters: Normal.  Urinary bladder: Normal.    Abdominal Aorta: Scattered atherosclerotic calcifications.  IVC: Normal.    Lymph Nodes: Normal. Mildly prominent lymph node near the cecum.    Large and Small Bowel: Normal.  Appendix: Surgically absent.    Pelvic Organs:  Normal.  Peritoneum: Mild retroperitoneal fat stranding.  Bony structures: No acute abnormality. Mild lumbosacral facet joint  and endplate degenerative changes. Mild endplate degenerative changes  at L1-2.    Other Findings:  Inguinal lymph nodes are normal.      Impression    IMPRESSION:  Mild pancreatitis. No evidence of apparent pancreatic necrosis or  hemorrhage.    Hepatic steatosis.    3.8 cm left adrenal gland myolipoma, a benign entity.     Splenomegaly.    LATRICE ESCOTO MD       ASSESSMENT/PLAN:   (R10.13) Abdominal pain, epigastric  (primary encounter diagnosis)  Comment: He has pancreatitis on imaging which is most likely due to HCTZ   Plan:   He will stop HCTZ.  GGT, CRP, inflammation, which will be drawn later  next week.  .    FUTURE APPOINTMENTS:       - Follow-up visit in 2 weeks for Hypertension and medication adjustment.    Beltran Beal DO, DO  [facility] - [place]

## 2019-04-18 NOTE — NURSING NOTE
"Chief Complaint   Patient presents with     Abdominal Pain       Initial /90 (BP Location: Right arm, Patient Position: Sitting, Cuff Size: Adult Large)   Pulse 75   Temp 98  F (36.7  C) (Tympanic)   Wt 145.2 kg (320 lb)   SpO2 97%   BMI 44.63 kg/m   Estimated body mass index is 44.63 kg/m  as calculated from the following:    Height as of 4/17/19: 1.803 m (5' 11\").    Weight as of this encounter: 145.2 kg (320 lb).  Medication Reconciliation: complete    Hanny Boucher LPN  "

## 2019-04-19 DIAGNOSIS — R10.13 ABDOMINAL PAIN, EPIGASTRIC: ICD-10-CM

## 2019-04-19 LAB
CRP SERPL-MCNC: 88 MG/L (ref 0–8)
GGT SERPL-CCNC: 46 U/L (ref 0–75)

## 2019-04-19 PROCEDURE — 36415 COLL VENOUS BLD VENIPUNCTURE: CPT | Performed by: INTERNAL MEDICINE

## 2019-04-19 PROCEDURE — 82977 ASSAY OF GGT: CPT | Mod: 90 | Performed by: INTERNAL MEDICINE

## 2019-04-19 PROCEDURE — 99000 SPECIMEN HANDLING OFFICE-LAB: CPT | Performed by: INTERNAL MEDICINE

## 2019-04-19 PROCEDURE — 86140 C-REACTIVE PROTEIN: CPT | Performed by: INTERNAL MEDICINE

## 2019-05-07 ENCOUNTER — OFFICE VISIT (OUTPATIENT)
Dept: INTERNAL MEDICINE | Facility: OTHER | Age: 44
End: 2019-05-07
Attending: INTERNAL MEDICINE
Payer: COMMERCIAL

## 2019-05-07 VITALS
OXYGEN SATURATION: 96 % | HEART RATE: 87 BPM | SYSTOLIC BLOOD PRESSURE: 152 MMHG | RESPIRATION RATE: 14 BRPM | TEMPERATURE: 99 F | BODY MASS INDEX: 44.27 KG/M2 | WEIGHT: 315 LBS | DIASTOLIC BLOOD PRESSURE: 80 MMHG

## 2019-05-07 DIAGNOSIS — M99.01 SOMATIC DYSFUNCTION OF CERVICAL REGION: Primary | ICD-10-CM

## 2019-05-07 PROCEDURE — 98925 OSTEOPATH MANJ 1-2 REGIONS: CPT | Performed by: INTERNAL MEDICINE

## 2019-05-07 PROCEDURE — 99212 OFFICE O/P EST SF 10 MIN: CPT | Mod: 25 | Performed by: INTERNAL MEDICINE

## 2019-05-07 ASSESSMENT — ENCOUNTER SYMPTOMS
MYALGIAS: 1
CONSTITUTIONAL NEGATIVE: 1
NECK PAIN: 1
HEADACHES: 1
DIZZINESS: 0
PHOTOPHOBIA: 0
TREMORS: 0
TINGLING: 0
BLURRED VISION: 0
EYE PAIN: 0
NAUSEA: 0
ABDOMINAL PAIN: 0

## 2019-05-07 ASSESSMENT — PAIN SCALES - GENERAL: PAINLEVEL: NO PAIN (1)

## 2019-05-07 NOTE — NURSING NOTE
"Chief Complaint   Patient presents with     Musculoskeletal Problem       Initial /80 (BP Location: Right arm, Patient Position: Sitting, Cuff Size: Adult Large)   Pulse 87   Temp 99  F (37.2  C) (Tympanic)   Resp 14   Wt 144 kg (317 lb 6.4 oz)   SpO2 96%   BMI 44.27 kg/m   Estimated body mass index is 44.27 kg/m  as calculated from the following:    Height as of 4/17/19: 1.803 m (5' 11\").    Weight as of this encounter: 144 kg (317 lb 6.4 oz).  Medication Reconciliation: complete    Jessa Behrman, LPN  "

## 2019-05-07 NOTE — PROGRESS NOTES
HPI  Patient is a 44 yo male with obesity and recurrent tension headaches which start over the left neck and progress to the anterior head with eventual tightness around the head.  He denies any upper extremity weakness or sensory changes.  He denies any vision changes.  He denies any nausea or abdominal pains.    Past Medical History:   Diagnosis Date     GERD (gastroesophageal reflux disease) 05/18/2012     Gout, unspecified 09/07/2005     Hyperlipidemia 05/18/2012     Past Surgical History:   Procedure Laterality Date     ADENOIDECTOMY       APPENDECTOMY       CIRCUMCISION       lap band       Onychomycoses       TONSILLECTOMY         Review of Systems   Constitutional: Negative.    Eyes: Negative for blurred vision, photophobia and pain.   Gastrointestinal: Negative for abdominal pain and nausea.   Musculoskeletal: Positive for myalgias and neck pain.   Neurological: Positive for headaches. Negative for dizziness, tingling and tremors.       /80 (BP Location: Right arm, Patient Position: Sitting, Cuff Size: Adult Large)   Pulse 87   Temp 99  F (37.2  C) (Tympanic)   Resp 14   Wt 144 kg (317 lb 6.4 oz)   SpO2 96%   BMI 44.27 kg/m      Physical Exam   Constitutional: He appears well-developed.   Musculoskeletal:        Cervical back: He exhibits decreased range of motion, tenderness and spasm.   OA motion is normal.    Head rotation:    Active rotation is 70 degrees to the left and 65 degrees to the right.    There is prominence of the C 2 vertebra on the left with poor translation to the right.    There is prominence of the C 7 vertebra on the left with poor translation to the right.    The cervical vertebra C 2 and C 7 are side bent right and rotated right.       Labs:  NA      Imaging:  Na      ASSESSMENT /PLAN:  (M99.01) Somatic dysfunction of cervical region  (primary encounter diagnosis)  Comment: The patient has recurring tension headaches due to neck spasms and cervical misalignment.  Plan:    OMT of the cervical spine.    Procedure Note:    Myofascial Release of the Cervical Region        Passive head rotation to the left is at 70 degrees and to the right is at 65 degrees.  Cervical vertebra C2 and C7 are rotated right and side bent right.  C2 and C7, vertebra have poor translation to the right.    Patient was placed into the supine position.  Myofascial release was performed on the cervical region with mild cephalad traction using the occiput as leverage.  This was performed for 3-5 minutes.  Next, the C2 and C7, levels were engaged with applied pressure over the left side putting the head into left side bending while the opposite hand stabilized the head.  The patient applied a counter force in the opposite direction as the examiner resisted the motion.  The counter motion was applied for threes sets of 5 seconds at the level of C7 with the examiner engaging the new barrier in left side bending of the head and neck after each set. Each vertebra described was treated individually.  No treatment was needed for C2 as this corrected with the treatment of the C7 vertebra.  The treatment was completed with myofascial release as described above.    Post treatment assessment showed the head rotation to be 75 degrees to the left and 70 degrees to the right.  The cervical vertebra motion at levels C2 thru C7 showed equal translation to both the left and the right.  The patient reported that his neck stiffness had improved.      Follow up with Provider - 2 days for OMT          Beltran Beal DO

## 2019-05-09 ENCOUNTER — OFFICE VISIT (OUTPATIENT)
Dept: INTERNAL MEDICINE | Facility: OTHER | Age: 44
End: 2019-05-09
Attending: INTERNAL MEDICINE
Payer: COMMERCIAL

## 2019-05-09 VITALS
TEMPERATURE: 99.2 F | BODY MASS INDEX: 45.13 KG/M2 | WEIGHT: 315 LBS | OXYGEN SATURATION: 97 % | RESPIRATION RATE: 14 BRPM | HEART RATE: 97 BPM | DIASTOLIC BLOOD PRESSURE: 80 MMHG | SYSTOLIC BLOOD PRESSURE: 162 MMHG

## 2019-05-09 DIAGNOSIS — M99.01 SOMATIC DYSFUNCTION OF CERVICAL REGION: Primary | ICD-10-CM

## 2019-05-09 PROCEDURE — 98925 OSTEOPATH MANJ 1-2 REGIONS: CPT | Performed by: INTERNAL MEDICINE

## 2019-05-09 ASSESSMENT — ENCOUNTER SYMPTOMS
NECK PAIN: 1
EYE PAIN: 0
MYALGIAS: 1
HEADACHES: 1
TREMORS: 0
TINGLING: 0
ABDOMINAL PAIN: 0
DIZZINESS: 0
NAUSEA: 0
CONSTITUTIONAL NEGATIVE: 1
PHOTOPHOBIA: 0
BLURRED VISION: 0

## 2019-05-09 ASSESSMENT — PAIN SCALES - GENERAL: PAINLEVEL: NO PAIN (1)

## 2019-05-09 NOTE — PROGRESS NOTES
Musculoskeletal Problem   Associated symptoms include headaches, myalgias and neck pain. Pertinent negatives include no abdominal pain or nausea.     Patient is a 42 yo male with obesity and recurrent tension headaches which start over the left neck and progress to the anterior head with eventual tightness around the head.  He is in for his second OMT treatment.  He denies any headache, but his neck is sore from his treatment two days ago.  He denies any upper extremity weakness or sensory changes.  He denies any vision changes.  He denies any nausea or abdominal pains.    Past Medical History:   Diagnosis Date     GERD (gastroesophageal reflux disease) 05/18/2012     Gout, unspecified 09/07/2005     Hyperlipidemia 05/18/2012     Past Surgical History:   Procedure Laterality Date     ADENOIDECTOMY       APPENDECTOMY       CIRCUMCISION       lap band       Onychomycoses       TONSILLECTOMY         Review of Systems   Constitutional: Negative.    Eyes: Negative for blurred vision, photophobia and pain.   Gastrointestinal: Negative for abdominal pain and nausea.   Musculoskeletal: Positive for myalgias and neck pain.   Neurological: Positive for headaches. Negative for dizziness, tingling and tremors.       /80 (BP Location: Right arm, Patient Position: Sitting, Cuff Size: Adult Large)   Pulse 97   Temp 99.2  F (37.3  C)   Resp 14   Wt 146.8 kg (323 lb 9.6 oz)   SpO2 97%   BMI 45.13 kg/m      Physical Exam   Constitutional: He appears well-developed.   Musculoskeletal:        Cervical back: He exhibits decreased range of motion and spasm.   OA motion is normal.    Head rotation:    Active rotation is 80 degrees to the left and 70 degrees to the right.    The cervical vertebra C2 thru C7 show equal translation to both the left and right.     Labs:  NA      Imaging:  Na      ASSESSMENT /PLAN:  (M99.01) Somatic dysfunction of cervical region  (primary encounter diagnosis)  Comment: The patient has recurring  tension headaches due to neck spasms.  Plan:   OMT of the cervical spine.    Procedure Note:    Myofascial Release of the Cervical Region        Passive head rotation to the left is at 80 degrees and to the right is at 70 degrees.  The cervical vertebra C2 thru C7 show equal translation to both the left and right.    Patient was placed into the supine position.  Myofascial release was performed on the cervical region with mild cephalad traction using the occiput as leverage.  This was performed for 3-5 minutes.   Myofacial release was repeated for 5 minutes follow by stretching of the right trapezius myofascial tissue with the examiner's right hand on the patient's anterior left shoulder and the right arm creating and cradle for the patient's head.  The examiner's left hand was placed on the right parietal region of the patient's head guiding the patient into left side bending of the head and neck.  This was held for 2 minutes.  The same technique was applied to the head and neck for stretching the left trapezius.      Post treatment assessment showed the head rotation to be 80 degrees to the left and 70 degrees to the right.  The cervical vertebra motion at levels C2 thru C7 showed equal translation to both the left and the right.   The patient reported that his neck pain had resolved.    Follow up with Provider - 5  days for OMT          Beltran Beal DO

## 2019-05-14 ENCOUNTER — OFFICE VISIT (OUTPATIENT)
Dept: INTERNAL MEDICINE | Facility: OTHER | Age: 44
End: 2019-05-14
Attending: INTERNAL MEDICINE
Payer: COMMERCIAL

## 2019-05-14 VITALS
SYSTOLIC BLOOD PRESSURE: 150 MMHG | OXYGEN SATURATION: 96 % | DIASTOLIC BLOOD PRESSURE: 90 MMHG | BODY MASS INDEX: 44.91 KG/M2 | HEART RATE: 93 BPM | TEMPERATURE: 98.4 F | WEIGHT: 315 LBS

## 2019-05-14 DIAGNOSIS — M99.01 SOMATIC DYSFUNCTION OF CERVICAL REGION: Primary | ICD-10-CM

## 2019-05-14 PROCEDURE — 98925 OSTEOPATH MANJ 1-2 REGIONS: CPT | Performed by: INTERNAL MEDICINE

## 2019-05-14 ASSESSMENT — ENCOUNTER SYMPTOMS
ABDOMINAL PAIN: 0
CONSTITUTIONAL NEGATIVE: 1
TREMORS: 0
BLURRED VISION: 0
MYALGIAS: 1
HEADACHES: 1
DIZZINESS: 0
EYE PAIN: 0
NAUSEA: 0
TINGLING: 0
PHOTOPHOBIA: 0
NECK PAIN: 1

## 2019-05-14 ASSESSMENT — PAIN SCALES - GENERAL: PAINLEVEL: NO PAIN (0)

## 2019-05-14 NOTE — NURSING NOTE
"Chief Complaint   Patient presents with     omt       Initial /90 (BP Location: Right arm, Patient Position: Sitting, Cuff Size: Adult Large)   Pulse 93   Temp 98.4  F (36.9  C) (Tympanic)   Wt 146.1 kg (322 lb)   SpO2 96%   BMI 44.91 kg/m   Estimated body mass index is 44.91 kg/m  as calculated from the following:    Height as of 4/17/19: 1.803 m (5' 11\").    Weight as of this encounter: 146.1 kg (322 lb).  Medication Reconciliation: complete    Hanny Boucher LPN  "

## 2019-05-14 NOTE — PROGRESS NOTES
Musculoskeletal Problem   Associated symptoms include headaches, myalgias and neck pain. Pertinent negatives include no abdominal pain or nausea.     Patient is a 44 yo male with obesity and recurrent tension headaches which start over the left neck and progress to the anterior head with eventual tightness around the head.  He is in for his third OMT treatment.  He did have a brief headache two days ago.  He ángel mild neck pain.  He denies any upper extremity weakness or sensory changes.  He denies any vision changes.  He denies any nausea or abdominal pains.    Past Medical History:   Diagnosis Date     GERD (gastroesophageal reflux disease) 05/18/2012     Gout, unspecified 09/07/2005     Hyperlipidemia 05/18/2012     Past Surgical History:   Procedure Laterality Date     ADENOIDECTOMY       APPENDECTOMY       CIRCUMCISION       lap band       Onychomycoses       TONSILLECTOMY         Review of Systems   Constitutional: Negative.    Eyes: Negative for blurred vision, photophobia and pain.   Gastrointestinal: Negative for abdominal pain and nausea.   Musculoskeletal: Positive for myalgias and neck pain.   Neurological: Positive for headaches. Negative for dizziness, tingling and tremors.       /90 (BP Location: Right arm, Patient Position: Sitting, Cuff Size: Adult Large)   Pulse 93   Temp 98.4  F (36.9  C) (Tympanic)   Wt 146.1 kg (322 lb)   SpO2 96%   BMI 44.91 kg/m      Physical Exam   Constitutional: He appears well-developed.   Musculoskeletal:        Right shoulder: He exhibits spasm.        Cervical back: He exhibits decreased range of motion and spasm.   OA motion is normal.    Head rotation:    Active rotation is 80 degrees to the left and 75 degrees to the right.      There is prominence of the C3 and C 7 vertebra on the left with poor translation to the right.    The cervical vertebra C 3 and C7 are side bent right and rotated right.   Labs:  NA      Imaging:  Na      ASSESSMENT  /PLAN:  (M99.01) Somatic dysfunction of cervical region  (primary encounter diagnosis)  Comment: The patient has recurring tension headaches due to neck spasms.  Plan:   OMT of the cervical spine.    Procedure Note:    Myofascial Release of the Cervical Region        Passive head rotation to the left is at 80 degrees and to the right is at 75 degrees.  The cervical vertebra C2 thru C7 show equal translation to both the left and right.    Patient was placed into the supine position.  Myofascial release was performed on the cervical region with mild cephalad traction using the occiput as leverage.  This was performed for 3-5 minutes.  Next the C3 and C 7, levels were engaged with applied pressure over the left side putting the head into left side bending while the opposite hand stabilized the head.  The patient applied a counter force in the opposite direction as the examiner resisted the motion.  The counter motion was applied for threes sets of 5 seconds at the level of C 7 with the examiner engaging the new barrier in left side bending of the head and neck after each set. Each vertebra described was treated individually.  No treatment was needed for C3  as this corrected with the more cephalad vertebra.  Myofacial release was repeated for 5 minutes follow by stretching of the right trapezius myofascial tissue with the examiner's right hand on the patient's anterior left shoulder and the right arm creating and cradle for the patient's head.  The examiner's left hand was placed on the right parietal region of the patient's head guiding the patient into left side bending of the head and neck.  This was held for 2 minutes.  The same technique was applied to the head and neck for stretching the left trapezius.  The treatment was completed with myofascial release as described above.    Post treatment assessment showed the head rotation to be 80 degrees to the left and 75 degrees to the right.  The cervical vertebra  motion at levels C2 thru C7 showed equal translation to both the left and the right.  The patient reported that his neck pain had improved.      Follow up with Provider - 2 days for FLORENCE Beal DO

## 2019-05-16 ENCOUNTER — OFFICE VISIT (OUTPATIENT)
Dept: INTERNAL MEDICINE | Facility: OTHER | Age: 44
End: 2019-05-16
Attending: INTERNAL MEDICINE
Payer: COMMERCIAL

## 2019-05-16 VITALS
OXYGEN SATURATION: 96 % | HEART RATE: 79 BPM | SYSTOLIC BLOOD PRESSURE: 150 MMHG | WEIGHT: 315 LBS | BODY MASS INDEX: 44.77 KG/M2 | DIASTOLIC BLOOD PRESSURE: 80 MMHG | TEMPERATURE: 98.4 F

## 2019-05-16 DIAGNOSIS — M99.01 SOMATIC DYSFUNCTION OF CERVICAL REGION: Primary | ICD-10-CM

## 2019-05-16 PROCEDURE — 98925 OSTEOPATH MANJ 1-2 REGIONS: CPT | Performed by: INTERNAL MEDICINE

## 2019-05-16 ASSESSMENT — ENCOUNTER SYMPTOMS
PHOTOPHOBIA: 0
ABDOMINAL PAIN: 0
CONSTITUTIONAL NEGATIVE: 1
EYE PAIN: 0
BLURRED VISION: 0
NECK PAIN: 1
TINGLING: 0
MYALGIAS: 1
TREMORS: 0
NAUSEA: 0
DIZZINESS: 0
HEADACHES: 1

## 2019-05-16 ASSESSMENT — PAIN SCALES - GENERAL: PAINLEVEL: NO PAIN (0)

## 2019-05-16 NOTE — PROGRESS NOTES
"  SUBJECTIVE:   Bucky Franco is a 43 year old male who presents to clinic today for the following   health issues:      Hypertension Follow-up      Outpatient blood pressures {ISCHECKIN}    Low Salt Diet: { :870339::\"no added salt\"}      Amount of exercise or physical activity: {Exercise frequency days per week:451672}    Problems taking medications regularly: {Med Problems:321383::\"No\"}    Medication side effects: {CHRONIC MED SIDE EFFECTS:669144::\"none\"}    Diet: { :790445}        {additional problems for provider to add:427892}    Additional history: {NONE - AS DOCUMENTED:329764::\"as documented\"}    Reviewed  and updated as needed this visit by clinical staff         Reviewed and updated as needed this visit by Provider         {HIST REVIEW/ LINKS 2:458047}    {PROVIDER CHARTING PREFERENCE:404118}      "

## 2019-05-21 ENCOUNTER — OFFICE VISIT (OUTPATIENT)
Dept: INTERNAL MEDICINE | Facility: OTHER | Age: 44
End: 2019-05-21
Attending: INTERNAL MEDICINE
Payer: COMMERCIAL

## 2019-05-21 VITALS
RESPIRATION RATE: 15 BRPM | TEMPERATURE: 98.6 F | HEART RATE: 84 BPM | WEIGHT: 315 LBS | BODY MASS INDEX: 44.63 KG/M2 | SYSTOLIC BLOOD PRESSURE: 142 MMHG | DIASTOLIC BLOOD PRESSURE: 80 MMHG | OXYGEN SATURATION: 96 %

## 2019-05-21 DIAGNOSIS — M99.01 SOMATIC DYSFUNCTION OF CERVICAL REGION: Primary | ICD-10-CM

## 2019-05-21 PROCEDURE — 98925 OSTEOPATH MANJ 1-2 REGIONS: CPT | Performed by: INTERNAL MEDICINE

## 2019-05-21 ASSESSMENT — ENCOUNTER SYMPTOMS
NAUSEA: 0
BLURRED VISION: 0
PHOTOPHOBIA: 0
HEADACHES: 1
ABDOMINAL PAIN: 0
TREMORS: 0
TINGLING: 0
MYALGIAS: 1
EYE PAIN: 0
NECK PAIN: 1
CONSTITUTIONAL NEGATIVE: 1
DIZZINESS: 0

## 2019-05-21 ASSESSMENT — PAIN SCALES - GENERAL: PAINLEVEL: NO PAIN (0)

## 2019-05-21 NOTE — PROGRESS NOTES
Musculoskeletal Problem   Associated symptoms include headaches, myalgias and neck pain. Pertinent negatives include no abdominal pain or nausea.     Patient is a 44 yo male with obesity and recurrent tension headaches which start over the left neck and progress to the anterior head with eventual tightness around the head.  He is in for his fifth OMT treatment.  He denies neck stiffness and neck pain.  He denies any upper extremity weakness or sensory changes.  He denies any vision changes.  He denies any nausea or abdominal pains.    Past Medical History:   Diagnosis Date     GERD (gastroesophageal reflux disease) 05/18/2012     Gout, unspecified 09/07/2005     Hyperlipidemia 05/18/2012     Past Surgical History:   Procedure Laterality Date     ADENOIDECTOMY       APPENDECTOMY       CIRCUMCISION       lap band       Onychomycoses       TONSILLECTOMY         Review of Systems   Constitutional: Negative.    Eyes: Negative for blurred vision, photophobia and pain.   Gastrointestinal: Negative for abdominal pain and nausea.   Musculoskeletal: Positive for myalgias and neck pain.   Neurological: Positive for headaches. Negative for dizziness, tingling and tremors.       /80 (BP Location: Right arm, Patient Position: Sitting, Cuff Size: Adult Large)   Pulse 84   Temp 98.6  F (37  C) (Tympanic)   Resp 15   Wt 145.2 kg (320 lb)   SpO2 96%   BMI 44.63 kg/m      Physical Exam   Constitutional: He appears well-developed.   Musculoskeletal:        Right shoulder: He exhibits spasm.        Cervical back: He exhibits decreased range of motion and spasm.   OA motion is normal.    Head rotation:    Active rotation is 80 degrees to the left and 80 degrees to the right.    There is prominence of the C2 vertebra on the left with poor translation to the right.    The cervical vertebra C 2 is side bent rightt and rotated right.         Labs:  NA      Imaging:  Na      ASSESSMENT /PLAN:  (M99.01) Somatic dysfunction of  cervical region  (primary encounter diagnosis)  Comment: His neck is doing much better.  He will likely be able to move to twice monthly visits starting next week.  Plan:   OMT of the cervical spine.    Procedure Note:    Myofascial Release of the Cervical Region        Passive head rotation to the left is at 80 degrees and to the right is at 80 degrees.  The cervical vertebra C2 thru C7 show equal translation to both the left and right.    Patient was placed into the supine position.  Myofascial release was performed on the cervical region with mild cephalad traction using the occiput as leverage.  This was performed for 3-5 minutes.   Myofacial release was repeated for 5 minutes follow by stretching of the right trapezius myofascial tissue with the examiner's right hand on the patient's anterior left shoulder and the right arm creating and cradle for the patient's head.  The examiner's left hand was placed on the right parietal region of the patient's head guiding the patient into left side bending of the head and neck.  This was held for 2 minutes.  The same technique was applied to the head and neck for stretching the left trapezius.  The treatment was completed with myofascial release as described above.        Post treatment assessment showed the head rotation to be 80 degrees to the left and 80 degrees to the right.  The cervical vertebra motion at levels C2 thru C7 showed equal translation to both the left and the right.  The patient reported that his neck pain had improved.      Follow up with Provider - 2 days for OMT          Beltran Beal DO

## 2019-05-21 NOTE — NURSING NOTE
"Chief Complaint   Patient presents with     Musculoskeletal Problem       Initial /80 (BP Location: Right arm, Patient Position: Sitting, Cuff Size: Adult Large)   Pulse 84   Temp 98.6  F (37  C) (Tympanic)   Resp 15   Wt 145.2 kg (320 lb)   SpO2 96%   BMI 44.63 kg/m   Estimated body mass index is 44.63 kg/m  as calculated from the following:    Height as of 4/17/19: 1.803 m (5' 11\").    Weight as of this encounter: 145.2 kg (320 lb).  Medication Reconciliation: complete    Jessa Behrman, LPN  "

## 2019-05-23 ENCOUNTER — OFFICE VISIT (OUTPATIENT)
Dept: INTERNAL MEDICINE | Facility: OTHER | Age: 44
End: 2019-05-23
Attending: INTERNAL MEDICINE
Payer: COMMERCIAL

## 2019-05-23 VITALS
WEIGHT: 315 LBS | SYSTOLIC BLOOD PRESSURE: 158 MMHG | TEMPERATURE: 98.6 F | BODY MASS INDEX: 45.33 KG/M2 | DIASTOLIC BLOOD PRESSURE: 94 MMHG | RESPIRATION RATE: 14 BRPM | OXYGEN SATURATION: 93 % | HEART RATE: 80 BPM

## 2019-05-23 DIAGNOSIS — I10 BENIGN ESSENTIAL HYPERTENSION: Primary | ICD-10-CM

## 2019-05-23 DIAGNOSIS — M99.01 SOMATIC DYSFUNCTION OF CERVICAL REGION: ICD-10-CM

## 2019-05-23 PROCEDURE — 99213 OFFICE O/P EST LOW 20 MIN: CPT | Mod: 25 | Performed by: INTERNAL MEDICINE

## 2019-05-23 PROCEDURE — 98925 OSTEOPATH MANJ 1-2 REGIONS: CPT | Performed by: INTERNAL MEDICINE

## 2019-05-23 ASSESSMENT — ENCOUNTER SYMPTOMS
TREMORS: 0
PHOTOPHOBIA: 0
CONSTITUTIONAL NEGATIVE: 1
HEADACHES: 1
NECK PAIN: 1
ABDOMINAL PAIN: 0
BLURRED VISION: 0
NAUSEA: 0
PALPITATIONS: 0
SHORTNESS OF BREATH: 0
COUGH: 0
ORTHOPNEA: 0
WHEEZING: 0
EYE PAIN: 0
DIZZINESS: 0
TINGLING: 0

## 2019-05-23 ASSESSMENT — PAIN SCALES - GENERAL: PAINLEVEL: NO PAIN (0)

## 2019-05-23 NOTE — PROGRESS NOTES
Musculoskeletal Problem   Associated symptoms include headaches and neck pain. Pertinent negatives include no abdominal pain, chest pain, coughing or nausea.   Hypertension     Patient is a 42 yo male with obesity and recurrent tension headaches which start over the left neck and progress to the anterior head with eventual tightness around the head.  He is in for his sixth OMT treatment.  Today he reports some left neck pain and starting of headache on the left.  He denies any upper extremity weakness or sensory changes.  He denies any vision changes.  He denies any nausea or abdominal pains.    Past Medical History:   Diagnosis Date     GERD (gastroesophageal reflux disease) 05/18/2012     Gout, unspecified 09/07/2005     Hyperlipidemia 05/18/2012     Past Surgical History:   Procedure Laterality Date     ADENOIDECTOMY       APPENDECTOMY       CIRCUMCISION       lap band       Onychomycoses       TONSILLECTOMY         Review of Systems   Constitutional: Negative.    Eyes: Negative for blurred vision, photophobia and pain.   Respiratory: Negative for cough, shortness of breath and wheezing.    Cardiovascular: Negative for chest pain, palpitations, orthopnea and leg swelling.   Gastrointestinal: Negative for abdominal pain and nausea.   Musculoskeletal: Positive for neck pain.   Neurological: Positive for headaches. Negative for dizziness, tingling and tremors.       Hypertension:  Bucky is not chocking his BP at home.  He takes his meedciation at 545 each morning.        BP Readings from Last 6 Encounters:   05/23/19 (!) 158/94   05/21/19 142/80   05/16/19 150/80   05/14/19 150/90   05/09/19 162/80   05/07/19 152/80       BP (!) 158/94 (BP Location: Right arm, Patient Position: Sitting, Cuff Size: Adult Large)   Pulse 80   Temp 98.6  F (37  C) (Oral)   Resp 14   Wt 147.4 kg (325 lb)   SpO2 93%   BMI 45.33 kg/m      Physical Exam   Constitutional: He appears well-developed.   Musculoskeletal:        Right  shoulder: He exhibits spasm.        Cervical back: He exhibits decreased range of motion and spasm.   OA motion is normal.    Head rotation:    Active rotation is 80 degrees to the left and 75 degrees to the right.    There is prominence of the C2 vertebra on the left with poor translation to the left.    The cervical vertebra C2 is side bent right and rotated right.     Labs:  NA      Imaging:  Na      ASSESSMENT /PLAN:  (M99.01) Somatic dysfunction of cervical region  (primary encounter diagnosis)  Comment: His neck is doing much better.  He will be changed to one weekly therapy after today.    Plan:   OMT of the cervical spine.    (I10) Benign essential hypertension  (primary encounter diagnosis)  Comment: Not at goal.  Plan:   Start diltiazem ER COATED BEADS (CARDIAZEM LA) 120 MG 24 hr tablet and continue Lisinopril 30 mg daily.      Procedure Note:    Myofascial Release of the Cervical Region      Passive head rotation to the left is at 80 degrees and to the right is at 75 degrees.  The cervical vertebra C2 thru C7 show equal translation to both the left and right.    Patient was placed into the supine position.  Myofascial release was performed on the cervical region with mild cephalad traction using the occiput as leverage.  This was performed for 3-5 minutes.  Next the C2, level was engaged with applied pressure over the left side putting the head into left side bending while the opposite hand stabilized the head.  The patient applied a counter force in the opposite direction as the examiner resisted the motion.  The counter motion was applied for threes sets of 5 seconds at the level of C 2 with the examiner engaging the new barrier in left side bending of the head and neck after each set. Each vertebra described was treated individually.  The treatment was completed with myofascial release as described above.      Post treatment assessment showed the head rotation to be 80 degrees to the left and 75  degrees to the right.  The cervical vertebra motion at levels C2 thru C7 showed equal translation to both the left and the right.  The patient reported that his neck pain had improved.      Follow up with Provider - 5 days for FLORENCE Beal DO

## 2019-06-04 ENCOUNTER — OFFICE VISIT (OUTPATIENT)
Dept: INTERNAL MEDICINE | Facility: OTHER | Age: 44
End: 2019-06-04
Attending: INTERNAL MEDICINE
Payer: COMMERCIAL

## 2019-06-04 VITALS
WEIGHT: 315 LBS | RESPIRATION RATE: 15 BRPM | SYSTOLIC BLOOD PRESSURE: 142 MMHG | OXYGEN SATURATION: 98 % | HEART RATE: 95 BPM | DIASTOLIC BLOOD PRESSURE: 70 MMHG | BODY MASS INDEX: 44.32 KG/M2 | TEMPERATURE: 98.5 F

## 2019-06-04 DIAGNOSIS — M99.01 SOMATIC DYSFUNCTION OF CERVICAL REGION: Primary | ICD-10-CM

## 2019-06-04 PROCEDURE — 98925 OSTEOPATH MANJ 1-2 REGIONS: CPT | Performed by: INTERNAL MEDICINE

## 2019-06-04 ASSESSMENT — ENCOUNTER SYMPTOMS
PALPITATIONS: 0
ORTHOPNEA: 0
SHORTNESS OF BREATH: 0
PHOTOPHOBIA: 0
NECK PAIN: 1
TINGLING: 0
CONSTITUTIONAL NEGATIVE: 1
HEADACHES: 1
NAUSEA: 0
BLURRED VISION: 0
COUGH: 0
TREMORS: 0
DIZZINESS: 0
WHEEZING: 0
ABDOMINAL PAIN: 0
EYE PAIN: 0

## 2019-06-04 ASSESSMENT — PAIN SCALES - GENERAL: PAINLEVEL: NO PAIN (1)

## 2019-06-04 NOTE — PROGRESS NOTES
"Subjective     Bucky Franco is a 43 year old male who presents to clinic today for the following health issues:    HPI   Hypertension Follow-up      Do you check your blood pressure regularly outside of the clinic? No     Are you following a low salt diet? Yes, conscious of it    Are your blood pressures ever more than 140 on the top number (systolic) OR more   than 90 on the bottom number (diastolic), for example 140/90? Not checking    Amount of exercise or physical activity: None    Problems taking medications regularly: No    Medication side effects: none    Diet: low salt      {additonal problems for provider to add (Optional):423908}    {HIST REVIEW/ LINKS 2 (Optional):648399}    {Additional problems for the provider to add (optional):791515}  Reviewed and updated as needed this visit by Provider         Review of Systems   {ROS COMP (Optional):076438}      Objective    There were no vitals taken for this visit.  There is no height or weight on file to calculate BMI.  Physical Exam   {Exam List (Optional):079433}    {Diagnostic Test Results (Optional):237332::\"Diagnostic Test Results:\",\"Labs reviewed in Epic\"}        {PROVIDER CHARTING PREFERENCE:872940}      "

## 2019-06-04 NOTE — NURSING NOTE
"Chief Complaint   Patient presents with     Hypertension     Musculoskeletal Problem       Initial /70 (BP Location: Right arm, Patient Position: Sitting, Cuff Size: Adult Regular)   Pulse 95   Temp 98.5  F (36.9  C) (Oral)   Resp 15   Wt 144.2 kg (317 lb 12.8 oz)   SpO2 98%   BMI 44.32 kg/m   Estimated body mass index is 44.32 kg/m  as calculated from the following:    Height as of 4/17/19: 1.803 m (5' 11\").    Weight as of this encounter: 144.2 kg (317 lb 12.8 oz).  Medication Reconciliation: complete    Jessa Behrman, LPN  "

## 2019-06-04 NOTE — PROGRESS NOTES
Musculoskeletal Problem   Associated symptoms include headaches and neck pain. Pertinent negatives include no abdominal pain, chest pain, coughing or nausea.   Hypertension     Patient is a 42 yo male with obesity and recurrent tension headaches which start over the left neck and progress to the anterior head with eventual tightness around the head.  He is in for his OMT treatment.  Today,  he reports some left neck pain.  He denies a current headache, but he had a headache two days ago lasting most of the day.  He denies any upper extremity weakness or sensory changes.  He denies any vision changes.  He denies any nausea or abdominal pains.    Past Medical History:   Diagnosis Date     GERD (gastroesophageal reflux disease) 05/18/2012     Gout, unspecified 09/07/2005     Hyperlipidemia 05/18/2012     Past Surgical History:   Procedure Laterality Date     ADENOIDECTOMY       APPENDECTOMY       CIRCUMCISION       lap band       Onychomycoses       TONSILLECTOMY         Review of Systems   Constitutional: Negative.    Eyes: Negative for blurred vision, photophobia and pain.   Respiratory: Negative for cough, shortness of breath and wheezing.    Cardiovascular: Negative for chest pain, palpitations, orthopnea and leg swelling.   Gastrointestinal: Negative for abdominal pain and nausea.   Musculoskeletal: Positive for neck pain.   Neurological: Positive for headaches. Negative for dizziness, tingling and tremors.     /70 (BP Location: Right arm, Patient Position: Sitting, Cuff Size: Adult Regular)   Pulse 95   Temp 98.5  F (36.9  C) (Oral)   Resp 15   Wt 144.2 kg (317 lb 12.8 oz)   SpO2 98%   BMI 44.32 kg/m      Physical Exam   Constitutional: He appears well-developed.   Musculoskeletal:        Right shoulder: He exhibits spasm.        Cervical back: He exhibits decreased range of motion and spasm.   OA motion is normal.    Head rotation:    Active rotation is 80 degrees to the left and 75 degrees to the  right.    There is prominence of the C2 and C3 vertebra on the left with poor translation to the right.    The cervical vertebra C2 is side bent right and rotated right.     Labs:  NA      Imaging:  Na      ASSESSMENT /PLAN:  (M99.01) Somatic dysfunction of cervical region  (primary encounter diagnosis)  Comment: His neck is doing much better.  He is on once weekly treatment with a missed appointment last week due rto provider being sick.    Plan:   OMT of the cervical spine.    Procedure Note:    Myofascial Release of the Cervical Region        Passive head rotation to the left is at 90 degrees and to the right is at 90 degrees.  Cervical vertebra C2 and C3 are rotated left and side bent left.  C2 and C3 vertebra have poor translation to the rightt.    Patient was placed into the supine position.  Myofascial release was performed on the cervical region with mild cephalad traction using the occiput as leverage.  This was performed for 3-5 minutes.  Next,  the C2 and C3, levels were engaged with applied pressure over the left side putting the head into left side bending while the opposite hand stabilized the head.  The patient applied a counter force in the opposite direction as the examiner resisted the motion.  The counter motion was applied for threes sets of 5 seconds at the level of C2 and C 3with the examiner engaging the new barrier in left side bending of the head and neck after each set. Each vertebra described was treated individually.  Myofacial release was repeated for 5 minutes follow by stretching of the right trapezius myofascial tissue with the examiners hands on the parietal regions and applying traction and side bending the head so that there was a 70 degree ankle formed with the neck and the top of the left shoulder causing stretching of the right trapezius  This was held for 2 minutes.  The same technique was applied to the head and neck for stretching the left trapezius.  The treatment was  completed with myofascial release as described above.       Post treatment assessment showed the head rotation to be 80 degrees to the left and 75 degrees to the right.  The cervical vertebra motion at levels C2 thru C7 showed equal translation to both the left and the right.  The patient reported that his neck pain had improved.      Follow up with Provider - 7 days for FLORENCE Beal DO

## 2019-06-13 NOTE — PROGRESS NOTES
Subjective     Bucky Franco is a 43 year old male who presents to clinic today for the following health issues:    HPI   Hypertension Follow-up      Do you check your blood pressure regularly outside of the clinic? No     Are you following a low salt diet? No    Are your blood pressures ever more than 140 on the top number (systolic) OR more   than 90 on the bottom number (diastolic), for example 140/90? Yes    Amount of exercise or physical activity: 1 day/week for an average of 30-45 minutes    Problems taking medications regularly: No    Medication side effects: none    Diet: regular (no restrictions)    BP Readings from Last 6 Encounters:   06/18/19 150/80   06/04/19 142/70   05/23/19 (!) 158/94   05/21/19 142/80   05/16/19 150/80   05/14/19 150/90     He took his blood pressure medications at 7 am today.    Cervical neck pain:  Bucky presents for her cervical neck pain and repeat OMT.  He has had minimal headaches.  He has right upper neck pain today.  He denies any upper extremity weakness or sensory changes.  He denies vision changes.  He denies any nausea.  -------------------------------------    Patient Active Problem List   Diagnosis     Health examination of defined subpopulation     Sinus headache     Somatic dysfunction of cervical region     Headache     Morbid obesity (H)     Type 2 diabetes mellitus without complication, without long-term current use of insulin (H)     Encounter for diabetic foot exam (H)     Benign essential hypertension     Past Surgical History:   Procedure Laterality Date     ADENOIDECTOMY       APPENDECTOMY       CIRCUMCISION       lap band       Onychomycoses       TONSILLECTOMY         Social History     Tobacco Use     Smoking status: Never Smoker     Smokeless tobacco: Former User     Types: Snuff   Substance Use Topics     Alcohol use: Yes     Comment: Rarely     Family History   Problem Relation Age of Onset     Hypertension Mother      Rheumatoid Arthritis Mother       Hyperlipidemia Father      Myocardial Infarction Paternal Grandfather      Diabetes Other      Asthma No family hx of            -------------------------------------  Reviewed and updated as needed this visit by Provider         Review of Systems   ROS COMP: Constitutional, HEENT, cardiovascular, pulmonary, gi and gu systems are negative, except as otherwise noted.      Objective    /80 (BP Location: Right arm, Patient Position: Chair, Cuff Size: Adult Large)   Pulse 76   Temp 97.4  F (36.3  C) (Tympanic)   Wt 144 kg (317 lb 6.4 oz)   SpO2 97%   BMI 44.27 kg/m    There is no height or weight on file to calculate BMI.  Physical Exam   GENERAL: healthy, alert and no distress  RESP: lungs clear to auscultation - no rales, rhonchi or wheezes  CV: regular rate and rhythm, normal S1 S2, no S3 or S4, no murmur, click or rub, no peripheral edema and peripheral pulses strong  MS: no gross musculoskeletal defects noted, no edema  MS:   OA motion is normal.    Head rotation:    Active rotation is degrees to  80 the left and 75 degrees to the right.      There is prominence of the C3 vertebra on the rightt with poor translation to the right.    The cervical vertebra C 3 is  side bent right and rotated right.            Labs:  NA      Imaging:  NA      ASSESSMENT /PLAN:    (M99.01) Somatic dysfunction of cervical region  (primary encounter diagnosis)  Comment:   Plan:   OMT of the cervical spine.    (I10) Benign essential hypertension  Comment: Not at goal.  Plan:  Increase diltiazem ER (TIAZAC) from 120 MG to 240 MG 24 hr ER beaded capsule and continue Lisinopril 30 mg daily.        Procedure Note:    Myofascial Release of the Cervical Region        Passive head rotation to the left is at 80 degrees and to the right is at 75 degrees.  Cervical vertebra C3 is rotated right and side bent right   C3 vertebra have poor translation to the right.     Patient was placed into the supine position.  Myofascial release was  performed on the cervical region with mild cephalad traction using the occiput as leverage.  This was performed for 3-5 minutes.  Next, the C3, level was engaged with applied pressure over the left side putting the head into left side bending while the opposite hand stabilized the head.  The patient applied a counter force in the opposite direction as the examiner resisted the motion.  The counter motion was applied for threes sets of 5 seconds at the level of C 3with the examiner engaging the new barrier in left side bending of the head and neck after each set. Each vertebra described was treated individually.  Myofacial release was repeated for 5 minutes.     Post treatment assessment showed the head rotation to be 80 degrees to the left and 75 degrees to the right.  The cervical vertebra motion at levels C2 thru C7 showed equal translation to both the left and the right.  The patient reported that his neck pain had improved.        Follow up with Provider - 7 days for FLORENCE Beal DO

## 2019-06-18 ENCOUNTER — OFFICE VISIT (OUTPATIENT)
Dept: INTERNAL MEDICINE | Facility: OTHER | Age: 44
End: 2019-06-18
Attending: INTERNAL MEDICINE
Payer: COMMERCIAL

## 2019-06-18 VITALS
OXYGEN SATURATION: 97 % | BODY MASS INDEX: 44.27 KG/M2 | TEMPERATURE: 97.4 F | DIASTOLIC BLOOD PRESSURE: 82 MMHG | SYSTOLIC BLOOD PRESSURE: 148 MMHG | HEART RATE: 76 BPM | WEIGHT: 315 LBS

## 2019-06-18 DIAGNOSIS — I10 BENIGN ESSENTIAL HYPERTENSION: ICD-10-CM

## 2019-06-18 DIAGNOSIS — M99.01 SOMATIC DYSFUNCTION OF CERVICAL REGION: Primary | ICD-10-CM

## 2019-06-18 PROCEDURE — 99213 OFFICE O/P EST LOW 20 MIN: CPT | Mod: 25 | Performed by: INTERNAL MEDICINE

## 2019-06-18 PROCEDURE — 98925 OSTEOPATH MANJ 1-2 REGIONS: CPT | Performed by: INTERNAL MEDICINE

## 2019-06-18 RX ORDER — DILTIAZEM HYDROCHLORIDE 240 MG/1
240 CAPSULE, EXTENDED RELEASE ORAL DAILY
Qty: 90 CAPSULE | Refills: 1 | Status: SHIPPED | OUTPATIENT
Start: 2019-06-18 | End: 2019-07-09

## 2019-06-18 ASSESSMENT — PAIN SCALES - GENERAL: PAINLEVEL: NO PAIN (0)

## 2019-06-18 NOTE — NURSING NOTE
"Chief Complaint   Patient presents with     Hypertension     OMT       Initial /80 (BP Location: Right leg, Patient Position: Chair, Cuff Size: Adult Large)   Pulse 76   Temp 97.4  F (36.3  C) (Tympanic)   Wt 144 kg (317 lb 6.4 oz)   SpO2 97%   BMI 44.27 kg/m   Estimated body mass index is 44.27 kg/m  as calculated from the following:    Height as of 4/17/19: 1.803 m (5' 11\").    Weight as of this encounter: 144 kg (317 lb 6.4 oz).  Medication Reconciliation: complete      Kurt Jama LPN    "

## 2019-06-20 NOTE — PROGRESS NOTES
Subjective     Bucky Franco is a 43 year old male who presents to clinic today for the following health issues:    HPI   Hypertension Follow-up      Do you check your blood pressure regularly outside of the clinic? No     Are you following a low salt diet? No    Are your blood pressures ever more than 140 on the top number (systolic) OR more   than 90 on the bottom number (diastolic), for example 140/90? Yes    Amount of exercise or physical activity: None    Problems taking medications regularly: No    Medication side effects: none    Diet: regular (no restrictions)    BP Readings from Last 6 Encounters:   06/25/19 150/89   06/18/19 148/82   06/04/19 142/70   05/23/19 (!) 158/94   05/21/19 142/80   05/16/19 150/80     Headaches and neck pain:  He reports that he has had one posterior headache in the past week which was mild.  He has continued neck stiffness.  He denies any weakness or tingling or changes in sensation in the upper extremities.  He denies any dizziness.   He denies any abdominal pain or nausea.  He does have a frontal headache today.    -------------------------------------  Reviewed and updated as needed this visit by Provider         Review of Systems   ROS COMP: Constitutional, HEENT, cardiovascular, pulmonary, gi and gu systems are negative, except as otherwise noted.      Objective    /89 (BP Location: Right arm, Patient Position: Sitting, Cuff Size: Adult Large)   Pulse 88   Temp 98.6  F (37  C) (Tympanic)   Wt 144.7 kg (319 lb)   SpO2 95%   BMI 44.49 kg/m    Body mass index is 44.49 kg/m .  Physical Exam   Constitutional: He appears well-developed.   Musculoskeletal:        Cervical back: He exhibits spasm. He exhibits no tenderness, no bony tenderness and no edema.   OA motion is normal.    Head rotation:    Active rotation is 80 degrees to the left and 75 degrees to the right.    The cervical vertebra  C2 thru C7 have normal translation to the left and the right.           Diagnostic Test Results:  Results for orders placed or performed in visit on 06/25/19 (from the past 24 hour(s))   Comprehensive metabolic panel   Result Value Ref Range    Sodium 139 133 - 144 mmol/L    Potassium 3.8 3.4 - 5.3 mmol/L    Chloride 104 94 - 109 mmol/L    Carbon Dioxide 29 20 - 32 mmol/L    Anion Gap 6 3 - 14 mmol/L    Glucose 183 (H) 70 - 99 mg/dL    Urea Nitrogen 10 7 - 30 mg/dL    Creatinine 0.89 0.66 - 1.25 mg/dL    GFR Estimate >90 >60 mL/min/[1.73_m2]    GFR Estimate If Black >90 >60 mL/min/[1.73_m2]    Calcium 9.0 8.5 - 10.1 mg/dL    Bilirubin Total 0.5 0.2 - 1.3 mg/dL    Albumin 3.7 3.4 - 5.0 g/dL    Protein Total 7.3 6.8 - 8.8 g/dL    Alkaline Phosphatase 120 40 - 150 U/L    ALT 45 0 - 70 U/L    AST 25 0 - 45 U/L               ASSESSMENT /PLAN:  (M99.01) Somatic dysfunction of cervical region  Comment: He is doing much better with spasms of the neck minimal.  After today's treatment, he will have maintenance treatment every 2 weeks.  Plan:   OMT of the cervical spine.    (R09.81) Nasal congestion  (primary encounter diagnosis)  Comment: He has incomplete relief with Flonase.  Plan:   Start azelastine (ASTELIN) 0.1 % nasal spray, 2 sprays in each nostril BID and he will continue Flonase daily.    (M62.838) Muscle spasm  Comment: His potassium is normal so hypokalemia is ruled out as a cause of his spasms.  Plan:   Monitor and consider a muscle relaxant.    (I10) Benign essential hypertension  Comment: Not at goal.  He had his diltiazem increased one week ago.  Plan:   Continue Lisinopril 30 mg and Diltiazem 240 mg daily.  Follow at next visit for possible dosage adjustment.          Procedure Note:     Myofascial Release of the Cervical Region    Passive head rotation to the left is at 80 degrees and to the right is at 75 degrees.  The cervical vertebra C2 thru C7 show equal translation to both the left and right.     Patient was placed into the supine position.  Myofascial release was  performed on the cervical region with mild cephalad traction using the occiput as leverage.  This was performed for 3-5 minutes.        Post treatment assessment showed the head rotation to be 80 degrees to the left and 75 degrees to the right.  The cervical vertebra motion at levels C2 thru C7 showed equal translation to both the left and the right.  The patient reported that his neck pain had improved.  Myofacial release was repeated for 5 minutes follow by stretching of the right trapezius myofascial tissue with the examiners hands on the parietal regions and applying traction and side bending the head so that there was a 70 degree ankle formed with the neck and the top of the left shoulder causing stretching of the right trapezius  This was held for 2 minutes.  The same technique was applied to the head and neck for stretching the left trapezius.  The treatment was completed with myofascial release as described above.              Follow up with Provider - 2 week for OMT and HTN.             Beltran Beal DO

## 2019-06-25 ENCOUNTER — OFFICE VISIT (OUTPATIENT)
Dept: INTERNAL MEDICINE | Facility: OTHER | Age: 44
End: 2019-06-25
Attending: INTERNAL MEDICINE
Payer: COMMERCIAL

## 2019-06-25 VITALS
BODY MASS INDEX: 44.49 KG/M2 | HEART RATE: 88 BPM | DIASTOLIC BLOOD PRESSURE: 89 MMHG | WEIGHT: 315 LBS | OXYGEN SATURATION: 95 % | SYSTOLIC BLOOD PRESSURE: 150 MMHG | TEMPERATURE: 98.6 F

## 2019-06-25 DIAGNOSIS — I10 BENIGN ESSENTIAL HYPERTENSION: ICD-10-CM

## 2019-06-25 DIAGNOSIS — R09.81 NASAL CONGESTION: Primary | ICD-10-CM

## 2019-06-25 DIAGNOSIS — M99.01 SOMATIC DYSFUNCTION OF CERVICAL REGION: ICD-10-CM

## 2019-06-25 DIAGNOSIS — M62.838 MUSCLE SPASM: ICD-10-CM

## 2019-06-25 LAB
ALBUMIN SERPL-MCNC: 3.7 G/DL (ref 3.4–5)
ALP SERPL-CCNC: 120 U/L (ref 40–150)
ALT SERPL W P-5'-P-CCNC: 45 U/L (ref 0–70)
ANION GAP SERPL CALCULATED.3IONS-SCNC: 6 MMOL/L (ref 3–14)
AST SERPL W P-5'-P-CCNC: 25 U/L (ref 0–45)
BILIRUB SERPL-MCNC: 0.5 MG/DL (ref 0.2–1.3)
BUN SERPL-MCNC: 10 MG/DL (ref 7–30)
CALCIUM SERPL-MCNC: 9 MG/DL (ref 8.5–10.1)
CHLORIDE SERPL-SCNC: 104 MMOL/L (ref 94–109)
CO2 SERPL-SCNC: 29 MMOL/L (ref 20–32)
CREAT SERPL-MCNC: 0.89 MG/DL (ref 0.66–1.25)
GFR SERPL CREATININE-BSD FRML MDRD: >90 ML/MIN/{1.73_M2}
GLUCOSE SERPL-MCNC: 183 MG/DL (ref 70–99)
POTASSIUM SERPL-SCNC: 3.8 MMOL/L (ref 3.4–5.3)
PROT SERPL-MCNC: 7.3 G/DL (ref 6.8–8.8)
SODIUM SERPL-SCNC: 139 MMOL/L (ref 133–144)

## 2019-06-25 PROCEDURE — 98925 OSTEOPATH MANJ 1-2 REGIONS: CPT | Performed by: INTERNAL MEDICINE

## 2019-06-25 PROCEDURE — 80053 COMPREHEN METABOLIC PANEL: CPT | Performed by: INTERNAL MEDICINE

## 2019-06-25 PROCEDURE — 36415 COLL VENOUS BLD VENIPUNCTURE: CPT | Performed by: INTERNAL MEDICINE

## 2019-06-25 PROCEDURE — 99213 OFFICE O/P EST LOW 20 MIN: CPT | Mod: 25 | Performed by: INTERNAL MEDICINE

## 2019-06-25 RX ORDER — DILTIAZEM HYDROCHLORIDE 120 MG/1
240 TABLET, EXTENDED RELEASE ORAL DAILY
Refills: 0 | COMMUNITY
Start: 2019-05-23 | End: 2019-07-30

## 2019-06-25 RX ORDER — AZELASTINE 1 MG/ML
2 SPRAY, METERED NASAL 2 TIMES DAILY
Qty: 30 ML | Refills: 3 | Status: SHIPPED | OUTPATIENT
Start: 2019-06-25 | End: 2020-07-15

## 2019-06-25 ASSESSMENT — PAIN SCALES - GENERAL: PAINLEVEL: NO PAIN (0)

## 2019-06-25 NOTE — NURSING NOTE
"Chief Complaint   Patient presents with     Hypertension       Initial /89 (BP Location: Right arm, Patient Position: Sitting, Cuff Size: Adult Large)   Pulse 88   Temp 98.6  F (37  C) (Tympanic)   Wt 144.7 kg (319 lb)   SpO2 95%   BMI 44.49 kg/m   Estimated body mass index is 44.49 kg/m  as calculated from the following:    Height as of 4/17/19: 1.803 m (5' 11\").    Weight as of this encounter: 144.7 kg (319 lb).  Medication Reconciliation: complete      "

## 2019-07-09 ENCOUNTER — OFFICE VISIT (OUTPATIENT)
Dept: PEDIATRICS | Facility: OTHER | Age: 44
End: 2019-07-09
Attending: INTERNAL MEDICINE
Payer: COMMERCIAL

## 2019-07-09 VITALS
DIASTOLIC BLOOD PRESSURE: 88 MMHG | TEMPERATURE: 99.1 F | OXYGEN SATURATION: 96 % | BODY MASS INDEX: 44.05 KG/M2 | WEIGHT: 315 LBS | SYSTOLIC BLOOD PRESSURE: 148 MMHG | HEART RATE: 92 BPM

## 2019-07-09 DIAGNOSIS — M99.01 SOMATIC DYSFUNCTION OF CERVICAL REGION: Primary | ICD-10-CM

## 2019-07-09 DIAGNOSIS — L73.9 FOLLICULITIS: ICD-10-CM

## 2019-07-09 DIAGNOSIS — I10 BENIGN ESSENTIAL HYPERTENSION: ICD-10-CM

## 2019-07-09 PROCEDURE — 99213 OFFICE O/P EST LOW 20 MIN: CPT | Mod: 25 | Performed by: INTERNAL MEDICINE

## 2019-07-09 PROCEDURE — 98925 OSTEOPATH MANJ 1-2 REGIONS: CPT | Performed by: INTERNAL MEDICINE

## 2019-07-09 RX ORDER — SULFAMETHOXAZOLE/TRIMETHOPRIM 800-160 MG
1 TABLET ORAL DAILY
Qty: 30 TABLET | Refills: 0 | Status: SHIPPED | OUTPATIENT
Start: 2019-07-09 | End: 2019-10-02

## 2019-07-09 RX ORDER — DILTIAZEM HYDROCHLORIDE 240 MG/1
CAPSULE, EXTENDED RELEASE ORAL
Qty: 90 CAPSULE | Refills: 1 | Status: SHIPPED | OUTPATIENT
Start: 2019-07-09 | End: 2019-10-02

## 2019-07-09 ASSESSMENT — PAIN SCALES - GENERAL: PAINLEVEL: NO PAIN (0)

## 2019-07-09 NOTE — PROGRESS NOTES
Subjective     Bucky Franco is a 43 year old male who presents to clinic today for the following health issues:    HPI   Hypertension Follow-up      Do you check your blood pressure regularly outside of the clinic? No     Are you following a low salt diet? No    Are your blood pressures ever more than 140 on the top number (systolic) OR more   than 90 on the bottom number (diastolic), for example 140/90? Yes    Amount of exercise or physical activity: None    Problems taking medications regularly: No    Medication side effects: none    Diet: regular (no restrictions)    BP Readings from Last 6 Encounters:   07/09/19 148/80   06/25/19 150/89   06/18/19 148/82   06/04/19 142/70   05/23/19 (!) 158/94   05/21/19 142/80     Headaches and neck pain:  He reports that he has had one posterior headache in the past week which was mild and resolved within one hours of taking ibuprofen .  He denies any continued neck stiffness.  He denies any weakness or tingling or changes in sensation in the upper extremities.  He denies any dizziness.   He denies any abdominal pain or nausea.  He does have a frontal headache today.    -------------------------------------  Reviewed and updated as needed this visit by Provider         Review of Systems   ROS COMP: Constitutional, HEENT, cardiovascular, pulmonary, gi and gu systems are negative, except as otherwise noted.      Objective    /80 (BP Location: Right arm, Patient Position: Chair, Cuff Size: Adult Large)   Pulse 92   Temp 99.1  F (37.3  C) (Tympanic)   Wt 143.2 kg (315 lb 12.8 oz)   SpO2 96%   BMI 44.05 kg/m    Body mass index is 44.05 kg/m .  Physical Exam   Constitutional: He appears well-developed.   Musculoskeletal:        Cervical back: He exhibits spasm. He exhibits no tenderness, no bony tenderness and no edema.   OA motion is normal.    Head rotation:    Active rotation is 80 degrees to the left and 75 degrees to the right.    The cervical vertebra  C2 thru C7  have normal translation to the left and the right.      Papular rash over the posterior neck     Diagnostic Test Results:  No results found for this or any previous visit (from the past 24 hour(s)).            ASSESSMENT /PLAN:    (M99.01) Somatic dysfunction of cervical region  (primary encounter diagnosis)  Comment: The patient has frequent neck spasms which have been much better on exam and in headache control.  Plan:   OMT of the cervical spine and continue every other week treatments.    (I10) Benign essential hypertension  Comment: Not at goal.  Plan:   Continue Lisinopril 30 mg and increase diltiazem ER (TIAZAC) from 240 MG to 360 MG 24 hr ER    (L73.9) Folliculitis  Comment: Recurrent.  Plan:   Start sulfamethoxazole-trimethoprim (BACTRIM DS/SEPTRA DS) 800-160 MG tablet daily with planned prolonged course.        Procedure Note:     Myofascial Release of the Cervical Region    Passive head rotation to the left is at 80 degrees and to the right is at 75 degrees.  The cervical vertebra C2 thru C7 show equal translation to both the left and right.     Patient was placed into the supine position.  Myofascial release was performed on the cervical region with mild cephalad traction using the occiput as leverage.  This was performed for 3-5 minutes.        Post treatment assessment showed the head rotation to be 80 degrees to the left and 80 degrees to the right.  The cervical vertebra motion at levels C2 thru C7 showed equal translation to both the left and the right.  The patient reported that his neck pain had improved.  Myofacial release was repeated for 5 minutes follow by stretching of the right trapezius myofascial tissue with the examiners hands on the parietal regions and applying traction and side bending the head so that there was a 70 degree ankle formed with the neck and the top of the left shoulder causing stretching of the right trapezius  This was held for 2 minutes.  The same technique was applied  to the head and neck for stretching the left trapezius.  The treatment was completed with myofascial release as described above.              Follow up with Provider - 2 week for OMT and HTN.             Beltran Beal DO

## 2019-07-09 NOTE — NURSING NOTE
"Chief Complaint   Patient presents with     OMT       Initial /80 (BP Location: Right arm, Patient Position: Chair, Cuff Size: Adult Large)   Pulse 92   Temp 99.1  F (37.3  C) (Tympanic)   Wt 143.2 kg (315 lb 12.8 oz)   SpO2 96%   BMI 44.05 kg/m   Estimated body mass index is 44.05 kg/m  as calculated from the following:    Height as of 4/17/19: 1.803 m (5' 11\").    Weight as of this encounter: 143.2 kg (315 lb 12.8 oz).  Medication Reconciliation: complete     Kurt Jama LPN    "

## 2019-07-26 DIAGNOSIS — I10 BENIGN ESSENTIAL HYPERTENSION: Primary | ICD-10-CM

## 2019-07-31 ENCOUNTER — TELEPHONE (OUTPATIENT)
Dept: PEDIATRICS | Facility: OTHER | Age: 44
End: 2019-07-31

## 2019-07-31 NOTE — TELEPHONE ENCOUNTER
Pt calls back regarding yesterday call about medication,  Message given to pt to continue both per Dr.Hoppe Naomi العراقي

## 2019-08-16 DIAGNOSIS — I10 BENIGN ESSENTIAL HYPERTENSION: ICD-10-CM

## 2019-08-16 DIAGNOSIS — E11.42 TYPE 2 DIABETES MELLITUS WITH DIABETIC POLYNEUROPATHY, WITHOUT LONG-TERM CURRENT USE OF INSULIN (H): ICD-10-CM

## 2019-08-16 RX ORDER — LISINOPRIL 30 MG/1
TABLET ORAL
Qty: 90 TABLET | Refills: 0 | Status: SHIPPED | OUTPATIENT
Start: 2019-08-16 | End: 2020-07-15

## 2019-08-16 NOTE — TELEPHONE ENCOUNTER
Lisinopril  Last office visit: 07/09/19  Last refill: 01/31/19 #90 with 1     Multiple blood pressure readings have been elevated.  Last office visit another blood pressure medication was increased. Patient has not been in for a recheck. Please advise.    Thank you.

## 2019-09-26 NOTE — PROGRESS NOTES
Subjective     Bucky Franco is a 44 year old male who presents to clinic today for the following health issues:    HPI   Diabetes Follow-up      How often are you checking your blood sugar? Not at all    What time of day are you checking your blood sugars (select all that apply)?  Doesn't check sugars     Have you had any blood sugars above 200?  No    Have you had any blood sugars below 70?  No    What symptoms do you notice when your blood sugar is low?  Shaky and Weak    What concerns do you have today about your diabetes? None     Do you have any of these symptoms? (Select all that apply)  Numbness in feet and Redness, sores, or blisters on feet     Have you had a diabetic eye exam in the last 12 months? Yes- Date of last eye exam: november 2018    BP Readings from Last 2 Encounters:   10/02/19 (!) 160/100   07/09/19 148/88     Hemoglobin A1C (%)   Date Value   04/12/2019 7.1 (H)   01/10/2019 9.2 (H)     LDL Cholesterol Calculated (mg/dL)   Date Value   01/10/2019 50   10/11/2018     Cannot estimate LDL when triglyceride exceeds 400 mg/dL       Diabetes Management Resources  Hypertension Follow-up      Do you check your blood pressure regularly outside of the clinic? No     Are you following a low salt diet? Yes    Are your blood pressures ever more than 140 on the top number (systolic) OR more   than 90 on the bottom number (diastolic), for example 140/90? Yes     BP Readings from Last 6 Encounters:   10/02/19 (!) 142/100   07/09/19 148/88   06/25/19 150/89   06/18/19 148/82   06/04/19 142/70   05/23/19 (!) 158/94     Obesity      How many servings of fruits and vegetables do you eat daily?  0-1    On average, how many sweetened beverages do you drink each day (soda, juice, sweet tea, etc)?   2    How many days per week do you miss taking your medication? 0    He has not been doing any official exercise.      Wt Readings from Last 5 Encounters:   10/02/19 144.9 kg (319 lb 8 oz)   07/09/19 143.2 kg (315 lb  12.8 oz)   06/25/19 144.7 kg (319 lb)   06/18/19 144 kg (317 lb 6.4 oz)   06/04/19 144.2 kg (317 lb 12.8 oz)     -------------------------------------    Patient Active Problem List   Diagnosis     Health examination of defined subpopulation     Sinus headache     Somatic dysfunction of cervical region     Headache     Morbid obesity (H)     Type 2 diabetes mellitus without complication, without long-term current use of insulin (H)     Encounter for diabetic foot exam (H)     Benign essential hypertension     Past Surgical History:   Procedure Laterality Date     ADENOIDECTOMY       APPENDECTOMY       CIRCUMCISION       lap band       Onychomycoses       TONSILLECTOMY         Social History     Tobacco Use     Smoking status: Never Smoker     Smokeless tobacco: Former User     Types: Snuff   Substance Use Topics     Alcohol use: Yes     Comment: Rarely     Family History   Problem Relation Age of Onset     Hypertension Mother      Rheumatoid Arthritis Mother      Hyperlipidemia Father      Myocardial Infarction Paternal Grandfather      Diabetes Other      Asthma No family hx of            -------------------------------------  Reviewed and updated as needed this visit by Provider         Review of Systems   ROS COMP: CONSTITUTIONAL: NEGATIVE for fever, chills, change in weight  INTEGUMENTARY/SKIN: Recurrent folliculitis which is better controlled on bactrim  EYES: NEGATIVE for vision changes or irritation  ENT/MOUTH: NEGATIVE for ear, mouth and throat problems  RESP: NEGATIVE for significant cough or SOB  CV: NEGATIVE for chest pain, palpitations or peripheral edema  GI: NEGATIVE for nausea, abdominal pain, heartburn, or change in bowel habits  : NEGATIVE for frequency, dysuria, or hematuria  MUSCULOSKELETAL: NEGATIVE for significant arthralgias or myalgia  NEURO: NEGATIVE for weakness, dizziness or paresthesias  PSYCHIATRIC: NEGATIVE for changes in mood or affect      Objective    /80   Pulse 72   Temp  98.5  F (36.9  C) (Tympanic)   Wt 144.9 kg (319 lb 8 oz)   SpO2 97%   BMI 44.56 kg/m    Body mass index is 44.56 kg/m .  Physical Exam   GENERAL: healthy, alert and no distress  EYES: Eyes grossly normal to inspection and conjunctivae and sclerae normal  NECK: no adenopathy, no asymmetry, masses, or scars and thyroid normal to palpation  RESP: lungs clear to auscultation - no rales, rhonchi or wheezes  CV: regular rate and rhythm, normal S1 S2, no S3 or S4, no murmur, click or rub, no peripheral edema and peripheral pulses strong  ABDOMEN: soft, nontender, no hepatosplenomegaly, no masses and bowel sounds normal  ABDOMEN: no bruits heard  MS: no gross musculoskeletal defects noted, no edema  SKIN:   Occipital mild folliculitis    PSYCH: mentation appears normal, affect normal/bright    Diagnostic Test Results:  Labs reviewed in Epic      Results for orders placed or performed in visit on 10/02/19   Hemoglobin A1c   Result Value Ref Range    Hemoglobin A1C 6.8 (H) 0 - 5.6 %   Comprehensive metabolic panel   Result Value Ref Range    Sodium 137 133 - 144 mmol/L    Potassium 3.9 3.4 - 5.3 mmol/L    Chloride 103 94 - 109 mmol/L    Carbon Dioxide 28 20 - 32 mmol/L    Anion Gap 6 3 - 14 mmol/L    Glucose 131 (H) 70 - 99 mg/dL    Urea Nitrogen 9 7 - 30 mg/dL    Creatinine 0.70 0.66 - 1.25 mg/dL    GFR Estimate >90 >60 mL/min/[1.73_m2]    GFR Estimate If Black >90 >60 mL/min/[1.73_m2]    Calcium 8.8 8.5 - 10.1 mg/dL    Bilirubin Total 0.5 0.2 - 1.3 mg/dL    Albumin 3.7 3.4 - 5.0 g/dL    Protein Total 7.5 6.8 - 8.8 g/dL    Alkaline Phosphatase 119 40 - 150 U/L    ALT 37 0 - 70 U/L    AST 18 0 - 45 U/L   Estimated Average Glucose   Result Value Ref Range    Estimated Average Glucose 148 mg/dL         Lab Results   Component Value Date    A1C 6.8 10/02/2019    A1C 7.1 04/12/2019    A1C 9.2 01/10/2019    A1C 10.7 10/11/2018       Recent Labs   Lab Test 01/10/19  0942 10/11/18  1245   CHOL 132 221*   HDL 35* 26*   LDL 50  Cannot estimate LDL when triglyceride exceeds 400 mg/dL   TRIG 234* 891*         ASSESSMENT /PLAN:  (E11.9) Type 2 diabetes mellitus without complication, without long-term current use of insulin (H)  Comment: Controlled. His blood pressure is at goal.  His renal function is normal.   Plan:   He will continue Metformin 1000 mg BID    (I10) Benign essential hypertension  (primary encounter diagnosis)  Comment: At goal.  He has white coat syndrome with slow lowering of his BP in the office.  Plan:   He will continue diltiazem ER  COATED BEADS (CARDIAZEM LA) 360 mg daily.    (E66.01) Morbid obesity (H)  Comment: Stable weight.  Plan:   Bucky understands the exercise that he needs to do to accomplish weight loss.    (L73.9) Folliculitis  Comment: Improved  Plan:   sulfamethoxazole-trimethoprim (BACTRIM  DS/SEPTRA DS) 800-160 MG tablet daily for 30 days      Follow up with Provider - 6 months for DM, HTN, hyperlipidemia and diabetic foot exam      Beltranjh Beal DO, DO

## 2019-10-02 ENCOUNTER — APPOINTMENT (OUTPATIENT)
Dept: LAB | Facility: OTHER | Age: 44
End: 2019-10-02
Attending: INTERNAL MEDICINE
Payer: COMMERCIAL

## 2019-10-02 ENCOUNTER — OFFICE VISIT (OUTPATIENT)
Dept: INTERNAL MEDICINE | Facility: OTHER | Age: 44
End: 2019-10-02
Attending: INTERNAL MEDICINE
Payer: COMMERCIAL

## 2019-10-02 VITALS
BODY MASS INDEX: 44.56 KG/M2 | OXYGEN SATURATION: 97 % | TEMPERATURE: 98.5 F | SYSTOLIC BLOOD PRESSURE: 120 MMHG | HEART RATE: 72 BPM | WEIGHT: 315 LBS | DIASTOLIC BLOOD PRESSURE: 80 MMHG

## 2019-10-02 DIAGNOSIS — E11.9 TYPE 2 DIABETES MELLITUS WITHOUT COMPLICATION, WITHOUT LONG-TERM CURRENT USE OF INSULIN (H): ICD-10-CM

## 2019-10-02 DIAGNOSIS — E66.01 MORBID OBESITY (H): ICD-10-CM

## 2019-10-02 DIAGNOSIS — L73.9 FOLLICULITIS: ICD-10-CM

## 2019-10-02 DIAGNOSIS — I10 BENIGN ESSENTIAL HYPERTENSION: Primary | ICD-10-CM

## 2019-10-02 LAB
ALBUMIN SERPL-MCNC: 3.7 G/DL (ref 3.4–5)
ALP SERPL-CCNC: 119 U/L (ref 40–150)
ALT SERPL W P-5'-P-CCNC: 37 U/L (ref 0–70)
ANION GAP SERPL CALCULATED.3IONS-SCNC: 6 MMOL/L (ref 3–14)
AST SERPL W P-5'-P-CCNC: 18 U/L (ref 0–45)
BILIRUB SERPL-MCNC: 0.5 MG/DL (ref 0.2–1.3)
BUN SERPL-MCNC: 9 MG/DL (ref 7–30)
CALCIUM SERPL-MCNC: 8.8 MG/DL (ref 8.5–10.1)
CHLORIDE SERPL-SCNC: 103 MMOL/L (ref 94–109)
CO2 SERPL-SCNC: 28 MMOL/L (ref 20–32)
CREAT SERPL-MCNC: 0.7 MG/DL (ref 0.66–1.25)
EST. AVERAGE GLUCOSE BLD GHB EST-MCNC: 148 MG/DL
GFR SERPL CREATININE-BSD FRML MDRD: >90 ML/MIN/{1.73_M2}
GLUCOSE SERPL-MCNC: 131 MG/DL (ref 70–99)
HBA1C MFR BLD: 6.8 % (ref 0–5.6)
POTASSIUM SERPL-SCNC: 3.9 MMOL/L (ref 3.4–5.3)
PROT SERPL-MCNC: 7.5 G/DL (ref 6.8–8.8)
SODIUM SERPL-SCNC: 137 MMOL/L (ref 133–144)

## 2019-10-02 PROCEDURE — 80053 COMPREHEN METABOLIC PANEL: CPT | Performed by: INTERNAL MEDICINE

## 2019-10-02 PROCEDURE — 36415 COLL VENOUS BLD VENIPUNCTURE: CPT | Performed by: INTERNAL MEDICINE

## 2019-10-02 PROCEDURE — 83036 HEMOGLOBIN GLYCOSYLATED A1C: CPT | Performed by: INTERNAL MEDICINE

## 2019-10-02 PROCEDURE — 99214 OFFICE O/P EST MOD 30 MIN: CPT | Performed by: INTERNAL MEDICINE

## 2019-10-02 RX ORDER — METFORMIN HCL 500 MG
1000 TABLET, EXTENDED RELEASE 24 HR ORAL 2 TIMES DAILY WITH MEALS
Qty: 360 TABLET | Refills: 3 | Status: SHIPPED | OUTPATIENT
Start: 2019-10-02 | End: 2020-10-06

## 2019-10-02 RX ORDER — ROSUVASTATIN CALCIUM 10 MG/1
10 TABLET, COATED ORAL DAILY
Qty: 90 TABLET | Refills: 3 | Status: SHIPPED | OUTPATIENT
Start: 2019-10-02 | End: 2020-10-06

## 2019-10-02 RX ORDER — SULFAMETHOXAZOLE/TRIMETHOPRIM 800-160 MG
1 TABLET ORAL DAILY
Qty: 30 TABLET | Refills: 0 | Status: SHIPPED | OUTPATIENT
Start: 2019-10-02 | End: 2020-07-15

## 2019-10-02 RX ORDER — DILTIAZEM HYDROCHLORIDE 240 MG/1
CAPSULE, EXTENDED RELEASE ORAL
Qty: 90 CAPSULE | Refills: 1 | Status: SHIPPED | OUTPATIENT
Start: 2019-10-02 | End: 2020-01-03

## 2019-10-02 ASSESSMENT — PAIN SCALES - GENERAL: PAINLEVEL: MODERATE PAIN (4)

## 2019-10-02 NOTE — NURSING NOTE
"Chief Complaint   Patient presents with     Weight Problem     Diabetes     Hypertension       Initial BP (!) 160/100 (BP Location: Right arm, Patient Position: Chair, Cuff Size: Adult Large)   Pulse 72   Temp 98.5  F (36.9  C) (Tympanic)   Wt 144.9 kg (319 lb 8 oz)   SpO2 97%   BMI 44.56 kg/m   Estimated body mass index is 44.56 kg/m  as calculated from the following:    Height as of 4/17/19: 1.803 m (5' 11\").    Weight as of this encounter: 144.9 kg (319 lb 8 oz).  Medication Reconciliation: complete  Kurt Jama LPN  "

## 2019-11-01 ENCOUNTER — TRANSFERRED RECORDS (OUTPATIENT)
Dept: HEALTH INFORMATION MANAGEMENT | Facility: CLINIC | Age: 44
End: 2019-11-01

## 2019-11-01 LAB — RETINOPATHY: NORMAL

## 2019-11-05 ENCOUNTER — HEALTH MAINTENANCE LETTER (OUTPATIENT)
Age: 44
End: 2019-11-05

## 2019-11-11 DIAGNOSIS — I10 BENIGN ESSENTIAL HYPERTENSION: Primary | ICD-10-CM

## 2019-11-12 RX ORDER — LISINOPRIL 30 MG/1
TABLET ORAL
Qty: 90 TABLET | Refills: 1 | Status: SHIPPED | OUTPATIENT
Start: 2019-11-12 | End: 2020-05-04

## 2019-11-25 ENCOUNTER — TRANSFERRED RECORDS (OUTPATIENT)
Dept: HEALTH INFORMATION MANAGEMENT | Facility: CLINIC | Age: 44
End: 2019-11-25

## 2019-11-25 LAB — RETINOPATHY: NEGATIVE

## 2019-12-30 DIAGNOSIS — I10 BENIGN ESSENTIAL HYPERTENSION: ICD-10-CM

## 2019-12-30 DIAGNOSIS — E11.9 TYPE 2 DIABETES MELLITUS WITHOUT COMPLICATION, WITHOUT LONG-TERM CURRENT USE OF INSULIN (H): ICD-10-CM

## 2020-01-03 RX ORDER — GLIMEPIRIDE 2 MG/1
TABLET ORAL
Qty: 180 TABLET | Refills: 1 | Status: SHIPPED | OUTPATIENT
Start: 2020-01-03 | End: 2020-07-02

## 2020-01-03 RX ORDER — DILTIAZEM HYDROCHLORIDE 240 MG/1
CAPSULE, EXTENDED RELEASE ORAL
Qty: 90 CAPSULE | Refills: 1 | Status: SHIPPED | OUTPATIENT
Start: 2020-01-03 | End: 2020-07-02

## 2020-02-16 ENCOUNTER — HEALTH MAINTENANCE LETTER (OUTPATIENT)
Age: 45
End: 2020-02-16

## 2020-03-30 ENCOUNTER — TELEPHONE (OUTPATIENT)
Dept: INTERNAL MEDICINE | Facility: OTHER | Age: 45
End: 2020-03-30

## 2020-03-30 NOTE — TELEPHONE ENCOUNTER
Pt called to confirm appt scheduled on April 2 for f/u on diabetes meds and lab work. I explained most all of our appts have been canceled or converted to Telephone Visit.     Please call this pt back today 3/30 before 4:30 and advise what he should do to maintain his condition. He states he still has med refills remaining and feels pretty good.     Bucky Franco  [phone]

## 2020-06-30 DIAGNOSIS — E11.9 TYPE 2 DIABETES MELLITUS WITHOUT COMPLICATION, WITHOUT LONG-TERM CURRENT USE OF INSULIN (H): ICD-10-CM

## 2020-06-30 DIAGNOSIS — I10 BENIGN ESSENTIAL HYPERTENSION: ICD-10-CM

## 2020-07-01 NOTE — TELEPHONE ENCOUNTER
Diltiazem      Last Written Prescription Date:  1-3-20  Last Fill Quantity: 90,   # refills: 0  Last Office Visit: 10-2-19  Future Office visit:    Next 5 appointments (look out 90 days)    Jul 15, 2020  8:40 AM CDT  (Arrive by 8:25 AM)  [facility] [place] [facility] [place] [facility] [address]  [place] [address]  [phone]           Glimerpiride      Last Written Prescription Date:  1-3-20  Last Fill Quantity: 180,   # refills: 0  Last Office Visit: 10-2-19  Future Office visit:    Next 5 appointments (look out 90 days)    Jul 15, 2020  8:40 AM CDT  (Arrive by 8:25 AM)  SHORT with Beltran Beal DO  [facility] [place] ([facility] [place] ) [address] [place] [address]  [phone]

## 2020-07-02 RX ORDER — DILTIAZEM HYDROCHLORIDE 240 MG/1
CAPSULE, EXTENDED RELEASE ORAL
Qty: 90 CAPSULE | Refills: 0 | Status: SHIPPED | OUTPATIENT
Start: 2020-07-02 | End: 2020-07-15

## 2020-07-02 RX ORDER — GLIMEPIRIDE 2 MG/1
TABLET ORAL
Qty: 180 TABLET | Refills: 0 | Status: SHIPPED | OUTPATIENT
Start: 2020-07-02 | End: 2020-07-15

## 2020-07-02 NOTE — TELEPHONE ENCOUNTER
Glimepiride failed protocol due to    Patient has documented A1c within the specified period of time.     Hemoglobin A1C   Date Value Ref Range Status   10/02/2019 6.8 (H) 0 - 5.6 % Final     Comment:     Normal <5.7% Prediabetes 5.7-6.4%  Diabetes 6.5% or higher - adopted from ADA   consensus guidelines.

## 2020-07-08 ENCOUNTER — MYC MEDICAL ADVICE (OUTPATIENT)
Dept: PEDIATRICS | Facility: OTHER | Age: 45
End: 2020-07-08
Payer: COMMERCIAL

## 2020-07-08 DIAGNOSIS — E11.9 TYPE 2 DIABETES MELLITUS WITHOUT COMPLICATION, WITHOUT LONG-TERM CURRENT USE OF INSULIN (H): Primary | ICD-10-CM

## 2020-07-08 DIAGNOSIS — I10 BENIGN ESSENTIAL HYPERTENSION: ICD-10-CM

## 2020-07-08 NOTE — PROGRESS NOTES
Subjective     Bucky Franco is a 45 year old male who presents to clinic today for the following health issues:    HPI   Diabetes Follow-up      How often are you checking your blood sugar? Not at all    What concerns do you have today about your diabetes? None     Do you have any of these symptoms? (Select all that apply)  Numbness in feet, Burning in feet and Redness, sores, or blisters on feet   He has numbness in his toes.    Have you had a diabetic eye exam in the last 12 months? Yes- Date of last eye exam: november 2019,  Location: [place] [facility]       Hypertension Follow-up      Do you check your blood pressure regularly outside of the clinic? No     Are you following a low salt diet? Yes    Are your blood pressures ever more than 140 on the top number (systolic) OR more   than 90 on the bottom number (diastolic), for example 140/90? No     BP Readings from Last 6 Encounters:   07/15/20 132/72   10/02/19 120/80   07/09/19 148/88   06/25/19 150/89   06/18/19 148/82   06/04/19 142/70     BP Readings from Last 2 Encounters:   07/15/20 132/72   10/02/19 120/80     Hemoglobin A1C (%)   Date Value   07/13/2020 7.9 (H)   10/02/2019 6.8 (H)     LDL Cholesterol Calculated (mg/dL)   Date Value   07/13/2020 58   01/10/2019 50                 How many servings of fruits and vegetables do you eat daily?  0-1    On average, how many sweetened beverages do you drink each day (Examples: soda, juice, sweet tea, etc.  Do NOT count diet or artificially sweetened beverages)?   0    How many days per week do you exercise enough to make your heart beat faster? 3 or less    How many minutes a day do you exercise enough to make your heart beat faster? 9 or less    How many days per week do you miss taking your medication? 0    -------------------------------------    Patient Active Problem List   Diagnosis     Health examination of defined subpopulation     Sinus headache     Somatic dysfunction of cervical region     Headache      Morbid obesity (H)     Type 2 diabetes mellitus without complication, without long-term current use of insulin (H)     Encounter for diabetic foot exam (H)     Benign essential hypertension     Past Surgical History:   Procedure Laterality Date     ADENOIDECTOMY       APPENDECTOMY       CIRCUMCISION       lap band       Onychomycoses       TONSILLECTOMY         Social History     Tobacco Use     Smoking status: Never Smoker     Smokeless tobacco: Former User     Types: Snuff   Substance Use Topics     Alcohol use: Yes     Comment: Rarely     Family History   Problem Relation Age of Onset     Hypertension Mother      Rheumatoid Arthritis Mother      Hyperlipidemia Father      Myocardial Infarction Paternal Grandfather      Diabetes Other      Asthma No family hx of          Current Outpatient Medications   Medication Sig Dispense Refill     aspirin 81 MG tablet Take 1 tablet (81 mg) by mouth daily 90 tablet 3     CARDIZEM  MG 24 hr tablet TAKE 1 TABLET BY MOUTH  DAILY ALONG WITH 1-240MG   TABLET 90 tablet 0     diltiazem ER (TIAZAC) 240 MG 24 hr ER beaded capsule TAKE 1 CAPSULE BY MOUTH DAILY WITH A 120MG CAPSULE 90 capsule 0     fluticasone (FLONASE) 50 MCG/ACT spray Spray 2 sprays into both nostrils daily 3 Bottle 3     glimepiride (AMARYL) 2 MG tablet TAKE 1 TABLET BY MOUTH 2 TIMES A DAY WITH MEALS 180 tablet 0     lisinopril (PRINIVIL/ZESTRIL) 30 MG tablet TAKE 1 TABLET BY MOUTH ONCE DAILY 90 tablet 0     lisinopril (ZESTRIL) 30 MG tablet TAKE 1 TABLET BY MOUTH ONCE DAILY 90 tablet 0     metFORMIN (GLUCOPHAGE-XR) 500 MG 24 hr tablet Take 2 tablets (1,000 mg) by mouth 2 times daily (with meals) 360 tablet 3     rosuvastatin (CRESTOR) 10 MG tablet Take 1 tablet (10 mg) by mouth daily 90 tablet 3         Reviewed and updated as needed this visit by Provider         Review of Systems   CONSTITUTIONAL: NEGATIVE for fever, chills, change in weight  INTEGUMENTARY/SKIN: NEGATIVE for worrisome rashes, moles or  "lesions  EYES: NEGATIVE for vision changes or irritation  ENT/MOUTH: NEGATIVE for ear, mouth and throat problems  RESP: NEGATIVE for significant cough or SOB  CV: NEGATIVE for chest pain, palpitations or peripheral edema  GI: NEGATIVE for nausea, abdominal pain, heartburn, or change in bowel habits   male :frequency increased at night.  No hesitancy, no weak stream, no retention, dysuria or hematuria  MUSCULOSKELETAL: NEGATIVE for significant arthralgias or myalgia  NEURO: POSITIVE for numbness or tingling of the toes and NEGATIVE for dizziness/lightheadedness or current headaches  ENDOCRINE: NEGATIVE for temperature intolerance, skin/hair changes  ENDOCRINE: POSITIVE  for polyuria  HEME: NEGATIVE for bleeding problems  PSYCHIATRIC: NEGATIVE for changes in mood or affect      Objective    /72 (BP Location: Left arm, Patient Position: Chair, Cuff Size: Adult Large)   Pulse 76   Temp 97.9  F (36.6  C) (Tympanic)   Ht 1.791 m (5' 10.5\")   Wt 148.4 kg (327 lb 3.2 oz)   SpO2 96%   BMI 46.28 kg/m    Body mass index is 46.28 kg/m .  Physical Exam   GENERAL: healthy, alert and no distress  EYES: Eyes grossly normal to inspection, EOMI and conjunctivae and sclerae normal  NECK: no adenopathy, no asymmetry, masses, or scars and thyroid normal to palpation  RESP: lungs clear to auscultation - no rales, rhonchi or wheezes  CV: regular rate and rhythm, normal S1 S2, no S3 or S4, no murmur, click or rub, no peripheral edema and peripheral pulses strong  ABDOMEN: soft, nontender, no hepatosplenomegaly, no masses and bowel sounds normal  ABDOMEN: no bruits heard and abdominal obesity  MS: no gross musculoskeletal defects noted, no edema  SKIN: no suspicious lesions or rashes  NEURO: Normal strength and tone, mentation intact and speech normal  Diabetic foot exam: normal DP and PT pulses, no trophic changes or ulcerative lesions and normal monofilament exam, except for findings noted on diabetic foot graphical image.  "             Areas 1,2,3 on both feet.        Diagnostic Test Results:  Labs reviewed in [facility]      Lab Results   Component Value Date    A1C 7.9 07/13/2020    A1C 6.8 10/02/2019    A1C 7.1 04/12/2019    A1C 9.2 01/10/2019    A1C 10.7 10/11/2018     Recent Labs   Lab Test 10/02/19  0911 06/25/19  0909    139   POTASSIUM 3.9 3.8   CHLORIDE 103 104   CO2 28 29   ANIONGAP 6 6   * 183*   BUN 9 10   CR 0.70 0.89   ANGEL 8.8 9.0       Recent Labs   Lab Test 07/13/20  1020 01/10/19  0942   CHOL 126 132   HDL 35* 35*   LDL 58 50   TRIG 166* 234*     Lab Results   Component Value Date    MICROL 107 07/13/2020         TSH   Date Value Ref Range Status   10/11/2018 2.36 0.40 - 4.00 mU/L Final       No results found for: MICROALBUMIN                ASSESSMENT /PLAN:  (E11.9) Type 2 diabetes mellitus without complication, without long-term current use of insulin (H)  (primary encounter diagnosis)  Comment: Not at goal.  His blood pressure is well controlled and his renal function is normal.  He is on a STATIN and and ACE inhibitor   Plan:   He will continue metformin 1000 mg BID and increase his morning Amaryl to 4 mg and continue Amaryl 2 mg in the PM.  We discussed insulin resistance in relation to weight and how exercise can reduce this with daily exercise of 10-15 minutes per day.    (I10) Benign essential hypertension  Comment: At goal.  Plan:   He will continue Lisinopril 30 mg and diltiazem ER (TIAZAC) 360 MG 24 hr ER beaded         capsule, diltiazem     (E11.9) Encounter for diabetic foot exam (H)  Comment:   His skin looks good.  He does have loss of fine touch of the toes.  Plan:   Bucky was educated on keeping his feet moisturized daily and to avoid going between the toes.    (Z23) Need for vaccination  Comment:   He needs his Hep B series  Plan: HEPATITIS B VACCINE,  ADULT  [18009], 1st          Administration  [26309]          Follow up with Provider - 3 months for diabetes.      Beltran Beal DO,  DO

## 2020-07-13 ENCOUNTER — APPOINTMENT (OUTPATIENT)
Dept: LAB | Facility: OTHER | Age: 45
End: 2020-07-13
Attending: INTERNAL MEDICINE
Payer: COMMERCIAL

## 2020-07-13 ENCOUNTER — RESULTS ONLY (OUTPATIENT)
Dept: PEDIATRICS | Facility: OTHER | Age: 45
End: 2020-07-13

## 2020-07-13 LAB
CHOLEST SERPL-MCNC: 126 MG/DL
CREAT UR-MCNC: 198 MG/DL
EST. AVERAGE GLUCOSE BLD GHB EST-MCNC: 180 MG/DL
HBA1C MFR BLD: 7.9 % (ref 0–5.6)
HDLC SERPL-MCNC: 35 MG/DL
LDLC SERPL CALC-MCNC: 58 MG/DL
MICROALBUMIN UR-MCNC: 107 MG/L
MICROALBUMIN/CREAT UR: 54.04 MG/G CR (ref 0–17)
NONHDLC SERPL-MCNC: 91 MG/DL
TRIGL SERPL-MCNC: 166 MG/DL

## 2020-07-13 PROCEDURE — 82043 UR ALBUMIN QUANTITATIVE: CPT | Performed by: INTERNAL MEDICINE

## 2020-07-13 PROCEDURE — 80061 LIPID PANEL: CPT | Performed by: INTERNAL MEDICINE

## 2020-07-13 PROCEDURE — 36415 COLL VENOUS BLD VENIPUNCTURE: CPT | Performed by: INTERNAL MEDICINE

## 2020-07-13 PROCEDURE — 83036 HEMOGLOBIN GLYCOSYLATED A1C: CPT | Performed by: INTERNAL MEDICINE

## 2020-07-15 ENCOUNTER — OFFICE VISIT (OUTPATIENT)
Dept: PEDIATRICS | Facility: OTHER | Age: 45
End: 2020-07-15
Attending: INTERNAL MEDICINE
Payer: COMMERCIAL

## 2020-07-15 VITALS
DIASTOLIC BLOOD PRESSURE: 72 MMHG | TEMPERATURE: 97.9 F | OXYGEN SATURATION: 96 % | SYSTOLIC BLOOD PRESSURE: 132 MMHG | WEIGHT: 315 LBS | HEIGHT: 71 IN | BODY MASS INDEX: 44.1 KG/M2 | HEART RATE: 76 BPM

## 2020-07-15 DIAGNOSIS — Z23 NEED FOR VACCINATION: ICD-10-CM

## 2020-07-15 DIAGNOSIS — I10 BENIGN ESSENTIAL HYPERTENSION: ICD-10-CM

## 2020-07-15 DIAGNOSIS — E11.9 ENCOUNTER FOR DIABETIC FOOT EXAM (H): ICD-10-CM

## 2020-07-15 DIAGNOSIS — E11.42 TYPE 2 DIABETES MELLITUS WITH DIABETIC POLYNEUROPATHY, WITHOUT LONG-TERM CURRENT USE OF INSULIN (H): ICD-10-CM

## 2020-07-15 DIAGNOSIS — E11.9 TYPE 2 DIABETES MELLITUS WITHOUT COMPLICATION, WITHOUT LONG-TERM CURRENT USE OF INSULIN (H): Primary | ICD-10-CM

## 2020-07-15 PROCEDURE — 90746 HEPB VACCINE 3 DOSE ADULT IM: CPT | Performed by: INTERNAL MEDICINE

## 2020-07-15 PROCEDURE — 90471 IMMUNIZATION ADMIN: CPT | Performed by: INTERNAL MEDICINE

## 2020-07-15 PROCEDURE — 99214 OFFICE O/P EST MOD 30 MIN: CPT | Mod: 25 | Performed by: INTERNAL MEDICINE

## 2020-07-15 RX ORDER — DILTIAZEM HYDROCHLORIDE 120 MG/1
TABLET, EXTENDED RELEASE ORAL
Qty: 90 TABLET | Refills: 3 | Status: SHIPPED | OUTPATIENT
Start: 2020-07-15 | End: 2021-07-27

## 2020-07-15 RX ORDER — LISINOPRIL 30 MG/1
30 TABLET ORAL DAILY
Qty: 90 TABLET | Refills: 3 | Status: SHIPPED | OUTPATIENT
Start: 2020-07-15 | End: 2020-08-03

## 2020-07-15 RX ORDER — DILTIAZEM HYDROCHLORIDE 240 MG/1
CAPSULE, EXTENDED RELEASE ORAL
Qty: 90 CAPSULE | Refills: 3 | Status: SHIPPED | OUTPATIENT
Start: 2020-07-15 | End: 2021-10-06

## 2020-07-15 RX ORDER — GLIMEPIRIDE 2 MG/1
TABLET ORAL
Qty: 270 TABLET | Refills: 3 | Status: SHIPPED | OUTPATIENT
Start: 2020-07-15 | End: 2021-08-16

## 2020-07-15 ASSESSMENT — MIFFLIN-ST. JEOR: SCORE: 2393.36

## 2020-07-15 ASSESSMENT — PAIN SCALES - GENERAL: PAINLEVEL: NO PAIN (0)

## 2020-08-03 ENCOUNTER — HOSPITAL ENCOUNTER (EMERGENCY)
Facility: HOSPITAL | Age: 45
Discharge: HOME | End: 2020-08-03
Attending: FAMILY MEDICINE | Admitting: FAMILY MEDICINE
Payer: COMMERCIAL

## 2020-08-03 VITALS
OXYGEN SATURATION: 96 % | RESPIRATION RATE: 14 BRPM | TEMPERATURE: 98.5 F | DIASTOLIC BLOOD PRESSURE: 106 MMHG | SYSTOLIC BLOOD PRESSURE: 186 MMHG

## 2020-08-03 DIAGNOSIS — T15.02XA FOREIGN BODY OF LEFT CORNEA, INITIAL ENCOUNTER: ICD-10-CM

## 2020-08-03 DIAGNOSIS — I10 ESSENTIAL HYPERTENSION: ICD-10-CM

## 2020-08-03 PROCEDURE — G0463 HOSPITAL OUTPT CLINIC VISIT: HCPCS

## 2020-08-03 PROCEDURE — 25000125 ZZHC RX 250: Performed by: FAMILY MEDICINE

## 2020-08-03 PROCEDURE — 99203 OFFICE O/P NEW LOW 30 MIN: CPT | Mod: Z6 | Performed by: FAMILY MEDICINE

## 2020-08-03 RX ORDER — ERYTHROMYCIN 5 MG/G
0.5 OINTMENT OPHTHALMIC 4 TIMES DAILY
Qty: 1 G | Refills: 0 | Status: SHIPPED | OUTPATIENT
Start: 2020-08-03 | End: 2020-10-22

## 2020-08-03 RX ORDER — TETRACAINE HYDROCHLORIDE 5 MG/ML
2 SOLUTION OPHTHALMIC ONCE
Status: COMPLETED | OUTPATIENT
Start: 2020-08-03 | End: 2020-08-03

## 2020-08-03 RX ORDER — LISINOPRIL 40 MG/1
40 TABLET ORAL DAILY
Qty: 90 TABLET | Refills: 0 | Status: SHIPPED | OUTPATIENT
Start: 2020-08-03 | End: 2020-10-14

## 2020-08-03 RX ADMIN — TETRACAINE HYDROCHLORIDE 2 DROP: 5 SOLUTION OPHTHALMIC at 18:13

## 2020-08-03 ASSESSMENT — ENCOUNTER SYMPTOMS
EYE DISCHARGE: 0
PHOTOPHOBIA: 0
CHILLS: 0
EYE REDNESS: 1
COUGH: 0
HEADACHES: 0
EYE PAIN: 1
SHORTNESS OF BREATH: 0
RHINORRHEA: 1
FEVER: 0

## 2020-08-03 NOTE — ED TRIAGE NOTES
Patient presents today with c/o foreign body in left eye.   States was tearing down a barn yesterday and cutting metal.   Noticed irritation yesterday.   Small black dot seen in eye.   5/10  Tried to flush with water - no relief.   Visine drops - no relief.   Eye is red / draining.

## 2020-08-03 NOTE — ED AVS SNAPSHOT
[facility]  [address] [address]  Phone:  [phone]                                    Bucky Franco   MRN: [id number]    Department:  [facility]   Date of Visit:  8/3/2020           After Visit Summary Signature Page    I have received my discharge instructions, and my questions have been answered. I have discussed any challenges I see with this plan with the nurse or doctor.    ..........................................................................................................................................  Patient/Patient Representative Signature      ..........................................................................................................................................  Patient Representative Print Name and Relationship to Patient    ..................................................               ................................................  Date                                   Time    ..........................................................................................................................................  Reviewed by Signature/Title    ...................................................              ..............................................  Date                                               Time          22EPIC Rev 08/18

## 2020-08-03 NOTE — ED NOTES
Patient discharged with understanding of instructions and recommendations.   Denies any questions or concerns.     Tania Bhatt LPN on 8/3/2020 at 6:43 PM

## 2020-10-14 ENCOUNTER — OFFICE VISIT (OUTPATIENT)
Dept: PEDIATRICS | Facility: OTHER | Age: 45
End: 2020-10-14
Attending: INTERNAL MEDICINE
Payer: COMMERCIAL

## 2020-10-14 VITALS
WEIGHT: 315 LBS | HEART RATE: 85 BPM | DIASTOLIC BLOOD PRESSURE: 82 MMHG | OXYGEN SATURATION: 95 % | SYSTOLIC BLOOD PRESSURE: 132 MMHG | TEMPERATURE: 98.2 F | BODY MASS INDEX: 45.89 KG/M2

## 2020-10-14 DIAGNOSIS — E11.9 TYPE 2 DIABETES MELLITUS WITHOUT COMPLICATION, WITHOUT LONG-TERM CURRENT USE OF INSULIN (H): Primary | ICD-10-CM

## 2020-10-14 DIAGNOSIS — I10 BENIGN ESSENTIAL HYPERTENSION: ICD-10-CM

## 2020-10-14 DIAGNOSIS — Z23 NEED FOR VACCINATION: ICD-10-CM

## 2020-10-14 DIAGNOSIS — E78.2 MIXED HYPERLIPIDEMIA: ICD-10-CM

## 2020-10-14 LAB
ALBUMIN SERPL-MCNC: 3.6 G/DL (ref 3.4–5)
ALP SERPL-CCNC: 145 U/L (ref 40–150)
ALT SERPL W P-5'-P-CCNC: 39 U/L (ref 0–70)
ANION GAP SERPL CALCULATED.3IONS-SCNC: 7 MMOL/L (ref 3–14)
AST SERPL W P-5'-P-CCNC: 24 U/L (ref 0–45)
BILIRUB SERPL-MCNC: 0.4 MG/DL (ref 0.2–1.3)
BUN SERPL-MCNC: 12 MG/DL (ref 7–30)
CALCIUM SERPL-MCNC: 8.7 MG/DL (ref 8.5–10.1)
CHLORIDE SERPL-SCNC: 101 MMOL/L (ref 94–109)
CO2 SERPL-SCNC: 29 MMOL/L (ref 20–32)
CREAT SERPL-MCNC: 0.74 MG/DL (ref 0.66–1.25)
EST. AVERAGE GLUCOSE BLD GHB EST-MCNC: 197 MG/DL
GFR SERPL CREATININE-BSD FRML MDRD: >90 ML/MIN/{1.73_M2}
GLUCOSE SERPL-MCNC: 207 MG/DL (ref 70–99)
HBA1C MFR BLD: 8.5 % (ref 0–5.6)
POTASSIUM SERPL-SCNC: 3.4 MMOL/L (ref 3.4–5.3)
PROT SERPL-MCNC: 7.7 G/DL (ref 6.8–8.8)
SODIUM SERPL-SCNC: 137 MMOL/L (ref 133–144)
T4 FREE SERPL-MCNC: 1.19 NG/DL (ref 0.76–1.46)
TSH SERPL DL<=0.005 MIU/L-ACNC: 2.67 MU/L (ref 0.4–4)

## 2020-10-14 PROCEDURE — 90472 IMMUNIZATION ADMIN EACH ADD: CPT | Performed by: INTERNAL MEDICINE

## 2020-10-14 PROCEDURE — 83036 HEMOGLOBIN GLYCOSYLATED A1C: CPT | Performed by: INTERNAL MEDICINE

## 2020-10-14 PROCEDURE — 36415 COLL VENOUS BLD VENIPUNCTURE: CPT | Performed by: INTERNAL MEDICINE

## 2020-10-14 PROCEDURE — 84439 ASSAY OF FREE THYROXINE: CPT | Performed by: INTERNAL MEDICINE

## 2020-10-14 PROCEDURE — 90732 PPSV23 VACC 2 YRS+ SUBQ/IM: CPT | Performed by: INTERNAL MEDICINE

## 2020-10-14 PROCEDURE — 90746 HEPB VACCINE 3 DOSE ADULT IM: CPT | Performed by: INTERNAL MEDICINE

## 2020-10-14 PROCEDURE — 84443 ASSAY THYROID STIM HORMONE: CPT | Performed by: INTERNAL MEDICINE

## 2020-10-14 PROCEDURE — 80053 COMPREHEN METABOLIC PANEL: CPT | Performed by: INTERNAL MEDICINE

## 2020-10-14 PROCEDURE — 99214 OFFICE O/P EST MOD 30 MIN: CPT | Mod: 25 | Performed by: INTERNAL MEDICINE

## 2020-10-14 PROCEDURE — 90471 IMMUNIZATION ADMIN: CPT | Performed by: INTERNAL MEDICINE

## 2020-10-14 RX ORDER — LIRAGLUTIDE 6 MG/ML
1.2 INJECTION SUBCUTANEOUS DAILY
Qty: 18 ML | Refills: 1 | Status: SHIPPED | OUTPATIENT
Start: 2020-10-14 | End: 2021-01-14

## 2020-10-14 RX ORDER — LISINOPRIL 40 MG/1
40 TABLET ORAL DAILY
Qty: 90 TABLET | Refills: 1 | Status: SHIPPED | OUTPATIENT
Start: 2020-10-14 | End: 2021-08-10

## 2020-10-14 ASSESSMENT — ANXIETY QUESTIONNAIRES
5. BEING SO RESTLESS THAT IT IS HARD TO SIT STILL: NOT AT ALL
6. BECOMING EASILY ANNOYED OR IRRITABLE: NOT AT ALL
2. NOT BEING ABLE TO STOP OR CONTROL WORRYING: NOT AT ALL
7. FEELING AFRAID AS IF SOMETHING AWFUL MIGHT HAPPEN: NOT AT ALL
GAD7 TOTAL SCORE: 0
4. TROUBLE RELAXING: NOT AT ALL
1. FEELING NERVOUS, ANXIOUS, OR ON EDGE: NOT AT ALL
3. WORRYING TOO MUCH ABOUT DIFFERENT THINGS: NOT AT ALL

## 2020-10-14 ASSESSMENT — PAIN SCALES - GENERAL: PAINLEVEL: NO PAIN (0)

## 2020-10-14 ASSESSMENT — PATIENT HEALTH QUESTIONNAIRE - PHQ9: SUM OF ALL RESPONSES TO PHQ QUESTIONS 1-9: 0

## 2020-10-14 NOTE — PROGRESS NOTES
Subjective     Bucky Franco is a 45 year old male who presents to clinic today for the following health issues:    HPI         Diabetes Follow-up      How often are you checking your blood sugar? One time every 6 months     What concerns do you have today about your diabetes? None     Do you have any of these symptoms? (Select all that apply)  Numbness in feet and Burning in feet    Have you had a diabetic eye exam in the last 12 months? Yes- Date of last eye exam: november 2019      He is not checking his glucose as he is on oral medications.  He denies any excessive thirst or excessive urination.      Hyperlipidemia Follow-Up      Are you regularly taking any medication or supplement to lower your cholesterol?   Yes- crestor    Are you having muscle aches or other side effects that you think could be caused by your cholesterol lowering medication?  No    Hypertension Follow-up      Do you check your blood pressure regularly outside of the clinic? No     Are you following a low salt diet? No    Are your blood pressures ever more than 140 on the top number (systolic) OR more   than 90 on the bottom number (diastolic), for example 140/90? No    BP Readings from Last 2 Encounters:   10/14/20 132/82   08/03/20 (!) 186/106     Hemoglobin A1C (%)   Date Value   07/13/2020 7.9 (H)   10/02/2019 6.8 (H)     LDL Cholesterol Calculated (mg/dL)   Date Value   07/13/2020 58   01/10/2019 50         BP Readings from Last 6 Encounters:   10/14/20 132/82   08/03/20 (!) 186/106   07/15/20 132/72   10/02/19 120/80   07/09/19 148/88   06/25/19 150/89         Wt Readings from Last 5 Encounters:   10/14/20 147.1 kg (324 lb 6.4 oz)   07/15/20 148.4 kg (327 lb 3.2 oz)   10/02/19 144.9 kg (319 lb 8 oz)   07/09/19 143.2 kg (315 lb 12.8 oz)   06/25/19 144.7 kg (319 lb)       How many servings of fruits and vegetables do you eat daily?  0-1    On average, how many sweetened beverages do you drink each day (Examples: soda, juice, sweet tea,  etc.  Do NOT count diet or artificially sweetened beverages)?   0    How many days per week do you exercise enough to make your heart beat faster? 3 or less    How many minutes a day do you exercise enough to make your heart beat faster? 9 or less    How many days per week do you miss taking your medication? 0      Past Medical History:   Diagnosis Date     GERD (gastroesophageal reflux disease) 05/18/2012     Gout, unspecified 09/07/2005     Hyperlipidemia 05/18/2012     Past Surgical History:   Procedure Laterality Date     ADENOIDECTOMY       APPENDECTOMY       CIRCUMCISION       lap band       Onychomycoses       TONSILLECTOMY       Review of Systems   Constitutional, HEENT, cardiovascular, pulmonary, gi and gu systems are negative, except as otherwise noted.      Objective    /82 (BP Location: Right arm, Patient Position: Chair, Cuff Size: Adult Large)   Pulse 85   Temp 98.2  F (36.8  C) (Tympanic)   Wt 147.1 kg (324 lb 6.4 oz)   SpO2 95%   BMI 45.89 kg/m    Body mass index is 45.89 kg/m .  Physical Exam   GENERAL: healthy, alert and no distress  EYES: Eyes grossly normal to inspection, EOMI and conjunctivae and sclerae normal  NECK: no adenopathy, no asymmetry, masses, or scars and thyroid normal to palpation  NECK: no carotid bruits  RESP: lungs clear to auscultation - no rales, rhonchi or wheezes  CV: regular rate and rhythm, normal S1 S2, no S3 or S4, no murmur, click or rub, no peripheral edema and peripheral pulses strong  ABDOMEN: soft, nontender, no hepatosplenomegaly, no masses and bowel sounds normal  ABDOMEN: no bruits heard  MS: no gross musculoskeletal defects noted, no edema  PSYCH: mentation appears normal, affect normal/bright        Lab Results   Component Value Date    A1C 8.5 10/14/2020    A1C 7.9 07/13/2020    A1C 6.8 10/02/2019    A1C 7.1 04/12/2019    A1C 9.2 01/10/2019     Results for orders placed or performed in visit on 10/14/20   T4 free     Status: None   Result Value Ref  Range    T4 Free 1.19 0.76 - 1.46 ng/dL   TSH     Status: None   Result Value Ref Range    TSH 2.67 0.40 - 4.00 mU/L   Hemoglobin A1c     Status: Abnormal   Result Value Ref Range    Hemoglobin A1C 8.5 (H) 0 - 5.6 %   Comprehensive metabolic panel     Status: Abnormal   Result Value Ref Range    Sodium 137 133 - 144 mmol/L    Potassium 3.4 3.4 - 5.3 mmol/L    Chloride 101 94 - 109 mmol/L    Carbon Dioxide 29 20 - 32 mmol/L    Anion Gap 7 3 - 14 mmol/L    Glucose 207 (H) 70 - 99 mg/dL    Urea Nitrogen 12 7 - 30 mg/dL    Creatinine 0.74 0.66 - 1.25 mg/dL    GFR Estimate >90 >60 mL/min/[1.73_m2]    GFR Estimate If Black >90 >60 mL/min/[1.73_m2]    Calcium 8.7 8.5 - 10.1 mg/dL    Bilirubin Total 0.4 0.2 - 1.3 mg/dL    Albumin 3.6 3.4 - 5.0 g/dL    Protein Total 7.7 6.8 - 8.8 g/dL    Alkaline Phosphatase 145 40 - 150 U/L    ALT 39 0 - 70 U/L    AST 24 0 - 45 U/L   Estimated Average Glucose     Status: None   Result Value Ref Range    Estimated Average Glucose 197 mg/dL       Recent Labs   Lab Test 07/13/20  1020 01/10/19  0942   CHOL 126 132   HDL 35* 35*   LDL 58 50   TRIG 166* 234*           ASSESSMENT /PLAN:  (E11.9) Type 2 diabetes mellitus without complication, without long-term current use of insulin (H)  (primary encounter diagnosis)  Comment:Worsening control likely due to his obesity.  His blood pressure is borderline control.  His renal function is normal.  He is on a STATIN and an Ace inhibitor.  Plan:  Add  liraglutide  (VICTOZA) 18 MG/3ML solution 1.2 mg injection daily and continue Metformin 1000 mg BID and Amaryl 4 mg in the AM and 2 mg in the evening.  DIABETES  EDUCATION REFERRAL ([place]), insulin pen         needle (30G X 8 MM) 30G X 8 MM miscellaneous  Once he has his meter he will check his glucose preprandial twice per day.    (I10) Benign essential hypertension  Comment: Borderline control.  He has not yet started Lisinopril 40 mg daily  Plan:  Start lisinopril (ZESTRIL) 40 MG tablet and continue  diltiazem 360 mg daily    (E78.2) Mixed hyperlipidemia  Comment: Controlled  Plan:   Continue Crestor 10 mg daily and ASA 81 daily    (Z23) Need for vaccination  Comment:   Plan: HEPATITIS B VACCINE,  ADULT  [99632], 1st          Administration  [72417], Pneumococcal vaccine         23 valent PPSV23  (Pneumovax) [09428], Each         additional admin.  (Right click and add         QUANTITY)  [82327]        Follow up with Provider - 3 months with Dr. De Santiago.           Beltran Beal DO, DO

## 2020-10-14 NOTE — NURSING NOTE
"Chief Complaint   Patient presents with     Hypertension     Diabetes     Lipids       Initial /82 (BP Location: Right arm, Patient Position: Chair, Cuff Size: Adult Large)   Pulse 85   Temp 98.2  F (36.8  C) (Tympanic)   Wt 147.1 kg (324 lb 6.4 oz)   SpO2 95%   BMI 45.89 kg/m   Estimated body mass index is 45.89 kg/m  as calculated from the following:    Height as of 7/15/20: 1.791 m (5' 10.5\").    Weight as of this encounter: 147.1 kg (324 lb 6.4 oz).  Medication Reconciliation: complete  Kurt Jama LPN  "

## 2020-10-15 ASSESSMENT — ANXIETY QUESTIONNAIRES: GAD7 TOTAL SCORE: 0

## 2020-10-22 ENCOUNTER — HOSPITAL ENCOUNTER (OUTPATIENT)
Dept: EDUCATION SERVICES | Facility: HOSPITAL | Age: 45
Discharge: HOME | End: 2020-10-22
Attending: INTERNAL MEDICINE | Admitting: INTERNAL MEDICINE
Payer: COMMERCIAL

## 2020-10-22 VITALS
HEART RATE: 69 BPM | WEIGHT: 315 LBS | HEIGHT: 72 IN | DIASTOLIC BLOOD PRESSURE: 74 MMHG | OXYGEN SATURATION: 97 % | RESPIRATION RATE: 16 BRPM | SYSTOLIC BLOOD PRESSURE: 162 MMHG | BODY MASS INDEX: 42.66 KG/M2

## 2020-10-22 DIAGNOSIS — E11.65 TYPE 2 DIABETES MELLITUS WITH HYPERGLYCEMIA, WITHOUT LONG-TERM CURRENT USE OF INSULIN (H): Primary | ICD-10-CM

## 2020-10-22 PROCEDURE — G0108 DIAB MANAGE TRN  PER INDIV: HCPCS | Performed by: DIETITIAN, REGISTERED

## 2020-10-22 ASSESSMENT — MIFFLIN-ST. JEOR: SCORE: 2396.87

## 2020-10-22 ASSESSMENT — PAIN SCALES - GENERAL: PAINLEVEL: MODERATE PAIN (4)

## 2020-10-22 NOTE — PROGRESS NOTES
"Diabetes Self-Management Education & Support    Presents for: Individual review    SUBJECTIVE/OBJECTIVE:  Presents for: Individual review  Accompanied by: Self  Diabetes education in the past 24mo: No  Focus of Visit: Assistance w/ making life changes, Healthy Eating, Reducing Risks, Monitoring, Diabetes Pathophysiology, Being Active, Taking Medication  Diabetes type: Type 2  Date of diagnosis: 2018  Disease course: Worsening  How confident are you filling out medical forms by yourself:: Extremely  Diabetes management related comments/concerns: Everything is new to him.  No previous education.  Transportation concerns: No  Difficulty affording diabetes medication?: No  Difficulty affording diabetes testing supplies?: No  Other concerns:: None  Cultural Influences/Ethnic Background:  American    Diabetes Symptoms & Complications:  Fatigue: Yes  Neuropathy: Yes  Polydipsia: No  Polyphagia: No  Polyuria: No  Visual change: No  Slow healing wounds: No  Symptom course: Stable  Weight trend: Stable  Complications assessed today?: Yes  CVA: No  Heart disease: No  Peripheral neuropathy: No    Patient Problem List and Family Medical History reviewed for relevant medical history, current medical status, and diabetes risk factors.    Vitals:  /74   Pulse 69   Resp 16   Ht 1.822 m (5' 11.75\")   Wt 146.8 kg (323 lb 9.6 oz)   SpO2 97%   BMI 44.19 kg/m    Estimated body mass index is 44.19 kg/m  as calculated from the following:    Height as of this encounter: 1.822 m (5' 11.75\").    Weight as of this encounter: 146.8 kg (323 lb 9.6 oz).   Last 3 BP:   BP Readings from Last 3 Encounters:   10/22/20 162/74   10/14/20 132/82   08/03/20 (!) 186/106       History   Smoking Status     Never Smoker   Smokeless Tobacco     Former User     Types: Snuff     Quit date: 10/1/2015       Labs:  Lab Results   Component Value Date    A1C 8.5 10/14/2020     Lab Results   Component Value Date     10/14/2020     Lab Results "   Component Value Date    LDL 58 07/13/2020     HDL Cholesterol   Date Value Ref Range Status   07/13/2020 35 (L) >39 mg/dL Final   ]  GFR Estimate   Date Value Ref Range Status   10/14/2020 >90 >60 mL/min/[1.73_m2] Final     Comment:     Non  GFR Calc  Starting 12/18/2018, serum creatinine based estimated GFR (eGFR) will be   calculated using the Chronic Kidney Disease Epidemiology Collaboration   (CKD-EPI) equation.       GFR Estimate If Black   Date Value Ref Range Status   10/14/2020 >90 >60 mL/min/[1.73_m2] Final     Comment:      GFR Calc  Starting 12/18/2018, serum creatinine based estimated GFR (eGFR) will be   calculated using the Chronic Kidney Disease Epidemiology Collaboration   (CKD-EPI) equation.       Lab Results   Component Value Date    CR 0.74 10/14/2020     No results found for: MICROALBUMIN    Healthy Eating:  Healthy Eating Assessed Today: Yes  Cultural/Mandaen diet restrictions?: No  Meal planning/habits: None, Avoiding sweets  How many times a week on average do you eat food made away from home (restaurant/take-out)?: 3  Meals include: Breakfast, Lunch, Dinner  Breakfast: 3 eggs with cheese with ham or turkey - small glass of milk, water or diet mountain dew.  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 : No(Pt has never had a meter.)    Taking Medications:  Diabetes Medication(s)     Biguanides       metFORMIN (GLUCOPHAGE-XR) 500 MG 24 hr tablet    TAKE 2 TABLETS BY MOUTH 2 TIMES A DAY WITH MEALS    Sulfonylureas       glimepiride  (AMARYL) 2 MG tablet    Take two tablets ( 4 mg ) by mouth in the morning and one tablet (2 mg) in the evening with meals.    Incretin Mimetic Agents (GLP-1 Receptor Agonists)       liraglutide (VICTOZA) 18 MG/3ML solution    Inject 1.2 mg Subcutaneous daily        Taking Medication Assessed Today: Yes  Current Treatments: Oral Medication (taken by mouth), Non-insulin Injectables(Metformin ER 1000 mg bid, Glimepiride 2 mg am/4 mg evening, Victoza 1.2 mg daily (started 6 days ago))  Problems taking diabetes medications regularly?: No  Diabetes medication side effects?: No    Problem Solving:  Problem Solving Assessed Today: Yes  Is the patient at risk for hypoglycemia?: Yes  Hypoglycemia Frequency: Other  Hypoglycemia Treatment: Other food(Sometimes just rests)  Is the patient at risk for DKA?: No    Hypoglycemia symptoms  Feeling shaky: Yes    Reducing Risks:  Reducing Risks Assessed Today: Yes  Diabetes Risks: Family History, Sedentary Lifestyle  CAD Risks: Diabetes Mellitus, Male sex, Obesity, Sedentary lifestyle  Has dilated eye exam at least once a year?: Yes  Sees dentist every 6 months?: Yes  Feet checked by healthcare provider in the last year?: Yes    Healthy Coping:  Healthy Coping Assessed Today: Yes  Emotional response to diabetes: Ready to learn, Acceptance, Concern for health and well-being  Stage of change: PREPARATION (Decided to change - considering how)  Patient Activation Measure Survey Score:  TED Score (Last Two) 10/11/2018   TED Raw Score 30   Activation Score 56   TED Level 3     Diabetes knowledge and skills assessment:   Patient is knowledgeable in diabetes management concepts related to: Pt has had no previous education.     Patient needs further education on the following diabetes management concepts: Healthy Eating, Being Active, Monitoring, Taking Medication, Problem Solving, Reducing Risks and Healthy Coping    Based on learning assessment above, most appropriate setting for further  diabetes education would be: Individual setting.      INTERVENTIONS:    Education provided today on:  AADE Self-Care Behaviors:  Diabetes Pathophysiology  Healthy Eating: carbohydrate counting, consistency in amount, composition, and timing of food intake, weight reduction, eating out, portion control and label reading  Being Active: relationship to blood glucose and benefits in promoting weight loss   Monitoring: purpose, proper technique, individual blood glucose targets, frequency of monitoring and proper sharps disposal  Taking Medication: action of prescribed medication    Opportunities for ongoing education and support in diabetes-self management were discussed.    Pt verbalized understanding of concepts discussed and recommendations provided today.       Education Materials Provided:  Type 2 Diabetes Basics  My Food Plan  Fast Food Guide  Gerber Contour Next Meter    ASSESSMENT:  Pt's most recent A1c above target at 8.5%.  Victoza was added to Metformin/Glimepiride regimen at that time but patient has only been taking it for six days at this point.  He does not have a meter and has had no education in the past.  Pt listened and participated well in session.  He seems willing to make changes to his diet and to try to begin doing some exercise.      Patient's most recent   Lab Results   Component Value Date    A1C 8.5 10/14/2020    is not meeting goal of <7.0    PLAN  Follow healthy/low carbohydrate meal plan provided -60-75 grams/meal, 15-30 grams/snack.  Try to begin getting some exercise.    Test glucose 2x/day - fasting and 2 hours post supper meal.    Keep taking current dose of Metformin and Victoza.  Try taking Glimepiride 4 mg am and 2 mg evening (pt was taking 2 mg am and 4 mg evening).   See Patient Instructions for co-developed, patient-stated behavior change goals.  AVS printed and provided to patient today.   Follow up in one month and next week for BP recheck r/t elevation.     Time Spent: 70  minutes  Encounter Type: Individual    Any diabetes medication dose changes were made via the CDE Protocol and Collaborative Practice Agreement with the patient's primary care provider. A copy of this encounter was shared with the provider.

## 2020-10-22 NOTE — PATIENT INSTRUCTIONS
-Follow meal plan provided - 60-75 grams/meal, 15-30 grams/snack.  -Try to get some daily exercise.  Goal is 30 minutes most days of the week.  -Test glucose 2x/day- fasting and 2 hours after supper.  -Target levels are fasting  and 2 hours after supper less than 180.   -Keep taking your current diabetes medications.  Try taking 2 Glimepiride in am and one in evening.   -Last A1c was 8.5%.  Goal is less than 7.0%.  -Follow up in approximately one month.  Bring your book and meter.  -Call with any concerns - MICHAEL Aviles, [phone].

## 2020-10-22 NOTE — LETTER
"    10/22/2020        RE: Bucky Franco  [id number]   [place] [address]        Diabetes Self-Management Education & Support    Presents for: Individual review    SUBJECTIVE/OBJECTIVE:  Presents for: Individual review  Accompanied by: Self  Diabetes education in the past 24mo: No  Focus of Visit: Assistance w/ making life changes, Healthy Eating, Reducing Risks, Monitoring, Diabetes Pathophysiology, Being Active, Taking Medication  Diabetes type: Type 2  Date of diagnosis: 2018  Disease course: Worsening  How confident are you filling out medical forms by yourself:: Extremely  Diabetes management related comments/concerns: Everything is new to him.  No previous education.  Transportation concerns: No  Difficulty affording diabetes medication?: No  Difficulty affording diabetes testing supplies?: No  Other concerns:: None  Cultural Influences/Ethnic Background:  American    Diabetes Symptoms & Complications:  Fatigue: Yes  Neuropathy: Yes  Polydipsia: No  Polyphagia: No  Polyuria: No  Visual change: No  Slow healing wounds: No  Symptom course: Stable  Weight trend: Stable  Complications assessed today?: Yes  CVA: No  Heart disease: No  Peripheral neuropathy: No    Patient Problem List and Family Medical History reviewed for relevant medical history, current medical status, and diabetes risk factors.    Vitals:  /74   Pulse 69   Resp 16   Ht 1.822 m (5' 11.75\")   Wt 146.8 kg (323 lb 9.6 oz)   SpO2 97%   BMI 44.19 kg/m    Estimated body mass index is 44.19 kg/m  as calculated from the following:    Height as of this encounter: 1.822 m (5' 11.75\").    Weight as of this encounter: 146.8 kg (323 lb 9.6 oz).   Last 3 BP:   BP Readings from Last 3 Encounters:   10/22/20 162/74   10/14/20 132/82   08/03/20 (!) 186/106       History   Smoking Status     Never Smoker   Smokeless Tobacco     Former User     Types: Snuff     Quit date: 10/1/2015       Labs:  Lab Results   Component Value Date    A1C 8.5 " 10/14/2020     Lab Results   Component Value Date     10/14/2020     Lab Results   Component Value Date    LDL 58 07/13/2020     HDL Cholesterol   Date Value Ref Range Status   07/13/2020 35 (L) >39 mg/dL Final   ]  GFR Estimate   Date Value Ref Range Status   10/14/2020 >90 >60 mL/min/[1.73_m2] Final     Comment:     Non  GFR Calc  Starting 12/18/2018, serum creatinine based estimated GFR (eGFR) will be   calculated using the Chronic Kidney Disease Epidemiology Collaboration   (CKD-EPI) equation.       GFR Estimate If Black   Date Value Ref Range Status   10/14/2020 >90 >60 mL/min/[1.73_m2] Final     Comment:      GFR Calc  Starting 12/18/2018, serum creatinine based estimated GFR (eGFR) will be   calculated using the Chronic Kidney Disease Epidemiology Collaboration   (CKD-EPI) equation.       Lab Results   Component Value Date    CR 0.74 10/14/2020     No results found for: MICROALBUMIN    Healthy Eating:  Healthy Eating Assessed Today: Yes  Cultural/Synagogue diet restrictions?: No  Meal planning/habits: None, Avoiding sweets  How many times a week on average do you eat food made away from home (restaurant/take-out)?: 3  Meals include: Breakfast, Lunch, Dinner  Breakfast: 3 eggs with cheese with ham or turkey - small glass of milk, water or diet mountain dew.  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 : No(Pt has never had a meter.)    Taking Medications:  Diabetes Medication(s)     Biguanides       metFORMIN (GLUCOPHAGE-XR) 500 MG 24 hr tablet     TAKE 2 TABLETS BY MOUTH 2 TIMES A DAY WITH MEALS    Sulfonylureas       glimepiride (AMARYL) 2 MG tablet    Take two tablets ( 4 mg ) by mouth in the morning and one tablet (2 mg) in the evening with meals.    Incretin Mimetic Agents (GLP-1 Receptor Agonists)       liraglutide (VICTOZA) 18 MG/3ML solution    Inject 1.2 mg Subcutaneous daily        Taking Medication Assessed Today: Yes  Current Treatments: Oral Medication (taken by mouth), Non-insulin Injectables(Metformin ER 1000 mg bid, Glimepiride 2 mg am/4 mg evening, Victoza 1.2 mg daily (started 6 days ago))  Problems taking diabetes medications regularly?: No  Diabetes medication side effects?: No    Problem Solving:  Problem Solving Assessed Today: Yes  Is the patient at risk for hypoglycemia?: Yes  Hypoglycemia Frequency: Other  Hypoglycemia Treatment: Other food(Sometimes just rests)  Is the patient at risk for DKA?: No    Hypoglycemia symptoms  Feeling shaky: Yes    Reducing Risks:  Reducing Risks Assessed Today: Yes  Diabetes Risks: Family History, Sedentary Lifestyle  CAD Risks: Diabetes Mellitus, Male sex, Obesity, Sedentary lifestyle  Has dilated eye exam at least once a year?: Yes  Sees dentist every 6 months?: Yes  Feet checked by healthcare provider in the last year?: Yes    Healthy Coping:  Healthy Coping Assessed Today: Yes  Emotional response to diabetes: Ready to learn, Acceptance, Concern for health and well-being  Stage of change: PREPARATION (Decided to change - considering how)  Patient Activation Measure Survey Score:  TED Score (Last Two) 10/11/2018   TED Raw Score 30   Activation Score 56   TED Level 3     Diabetes knowledge and skills assessment:   Patient is knowledgeable in diabetes management concepts related to: Pt has had no previous education.     Patient needs further education on the following diabetes management concepts: Healthy Eating, Being Active, Monitoring, Taking Medication, Problem Solving, Reducing Risks and Healthy  Coping    Based on learning assessment above, most appropriate setting for further diabetes education would be: Individual setting.      INTERVENTIONS:    Education provided today on:  AADE Self-Care Behaviors:  Diabetes Pathophysiology  Healthy Eating: carbohydrate counting, consistency in amount, composition, and timing of food intake, weight reduction, eating out, portion control and label reading  Being Active: relationship to blood glucose and benefits in promoting weight loss   Monitoring: purpose, proper technique, individual blood glucose targets, frequency of monitoring and proper sharps disposal  Taking Medication: action of prescribed medication    Opportunities for ongoing education and support in diabetes-self management were discussed.    Pt verbalized understanding of concepts discussed and recommendations provided today.       Education Materials Provided:  Type 2 Diabetes Basics  My Food Plan  Fast Food Guide  Gerber Contour Next Meter    ASSESSMENT:  Pt's most recent A1c above target at 8.5%.  Victoza was added to Metformin/Glimepiride regimen at that time but patient has only been taking it for six days at this point.  He does not have a meter and has had no education in the past.  Pt listened and participated well in session.  He seems willing to make changes to his diet and to try to begin doing some exercise.      Patient's most recent   Lab Results   Component Value Date    A1C 8.5 10/14/2020    is not meeting goal of <7.0    PLAN  Follow healthy/low carbohydrate meal plan provided -60-75 grams/meal, 15-30 grams/snack.  Try to begin getting some exercise.    Test glucose 2x/day - fasting and 2 hours post supper meal.    Keep taking current dose of Metformin and Victoza.  Try taking Glimepiride 4 mg am and 2 mg evening (pt was taking 2 mg am and 4 mg evening).   See Patient Instructions for co-developed, patient-stated behavior change goals.  AVS printed and provided to patient today.   Follow  up in one month and next week for BP recheck r/t elevation.     Time Spent: 70 minutes  Encounter Type: Individual    Any diabetes medication dose changes were made via the CDE Protocol and Collaborative Practice Agreement with the patient's primary care provider. A copy of this encounter was shared with the provider.          Sincerely,        Harmony Bird RD

## 2020-10-26 ENCOUNTER — ALLIED HEALTH/NURSE VISIT (OUTPATIENT)
Dept: EDUCATION SERVICES | Facility: OTHER | Age: 45
End: 2020-10-26
Attending: NURSE PRACTITIONER
Payer: COMMERCIAL

## 2020-10-26 VITALS — SYSTOLIC BLOOD PRESSURE: 151 MMHG | HEART RATE: 72 BPM | DIASTOLIC BLOOD PRESSURE: 89 MMHG

## 2020-10-26 DIAGNOSIS — I10 BENIGN ESSENTIAL HYPERTENSION: Primary | ICD-10-CM

## 2020-10-26 PROCEDURE — 99207 PR NO CHARGE NURSE ONLY: CPT

## 2020-10-27 DIAGNOSIS — I10 BENIGN ESSENTIAL HYPERTENSION: Primary | ICD-10-CM

## 2020-10-27 RX ORDER — METOPROLOL SUCCINATE 25 MG/1
12.5 TABLET, EXTENDED RELEASE ORAL DAILY
Qty: 30 TABLET | Refills: 1 | Status: SHIPPED | OUTPATIENT
Start: 2020-10-27 | End: 2021-05-03

## 2020-10-27 NOTE — PROGRESS NOTES
Bucky is currently on lisinopril 40mg + diltiazem 360mg once per day. He BP is still above goal. He has tried hydrochlorothiazide in the past resulting in pancreatitis. HR > 70  - start metoprolol succinate 12.5mg once per day  - recheck BP in one week, and titrate as required.   - appt in clinic in two weeks   - Discussed with Dr. Lian De Santiago MD

## 2020-12-02 ENCOUNTER — TELEPHONE (OUTPATIENT)
Dept: EDUCATION SERVICES | Facility: HOSPITAL | Age: 45
End: 2020-12-02

## 2020-12-02 ENCOUNTER — TRANSFERRED RECORDS (OUTPATIENT)
Dept: HEALTH INFORMATION MANAGEMENT | Facility: CLINIC | Age: 45
End: 2020-12-02

## 2020-12-02 ENCOUNTER — HOSPITAL ENCOUNTER (OUTPATIENT)
Dept: EDUCATION SERVICES | Facility: HOSPITAL | Age: 45
Discharge: HOME | End: 2020-12-02
Attending: NURSE PRACTITIONER | Admitting: FAMILY MEDICINE
Payer: COMMERCIAL

## 2020-12-02 VITALS
HEIGHT: 71 IN | BODY MASS INDEX: 43.68 KG/M2 | WEIGHT: 312 LBS | DIASTOLIC BLOOD PRESSURE: 78 MMHG | SYSTOLIC BLOOD PRESSURE: 132 MMHG | OXYGEN SATURATION: 95 % | HEART RATE: 98 BPM

## 2020-12-02 DIAGNOSIS — E11.65 TYPE 2 DIABETES MELLITUS WITH HYPERGLYCEMIA, WITHOUT LONG-TERM CURRENT USE OF INSULIN (H): Primary | ICD-10-CM

## 2020-12-02 LAB
RETINOPATHY: NEGATIVE

## 2020-12-02 PROCEDURE — G0108 DIAB MANAGE TRN  PER INDIV: HCPCS | Performed by: DIETITIAN, REGISTERED

## 2020-12-02 ASSESSMENT — PAIN SCALES - GENERAL: PAINLEVEL: NO PAIN (0)

## 2020-12-02 ASSESSMENT — MIFFLIN-ST. JEOR: SCORE: 2324.41

## 2020-12-02 NOTE — TELEPHONE ENCOUNTER
Pt was here today for diabetes follow up.  Current diabetes medications are: Metformin ER 1000 mg bid, Glimepiride 4 mg am/2 mg evening, Victoza 1.2 mg daily.  Current glucose levels improved but not yet to target:  Fasting-151, 165, 159, 149, 159, 153, 160  2 hours post supper-138, 195, 253, 175, 180  Pt would benefit from increase Victoza dose to 1.8 mg daily but now note hx of possible pancreatitis 4/2019 r/t hydrochlorothiazide.  Is it okay to increase Victoza or for him to even be on it?  He is tolerating without issues.      Also noted today that Metoprolol was ordered on 10/27 for pt's BP but he was unaware apparently and has not picked it up.  He feels BP better and wants to know if he should take it.  BP as follows (he tests 3x/day in am - one minute apart):  11/19:  133/89, 141/91, 134/82  11/20:  153/87, 141/89, 140/82  11/22:  130/78, 131/80, 137/75  11/23:  121/83, 122/81, 129/76  11/24:  145/85, 127/81, 142/80  11/25:  143/86, 128/83, 137/85  11/30:  131/81, 124/79, 119/79  12/1:    137/83, 121/83, 123/81  12/2:    138/84, 139/85, 132/84    BP today at appointment was 154/82 and 132/78 on recheck.    I can let him know if you would like him to start Metoprolol.    Thanks!

## 2020-12-02 NOTE — PROGRESS NOTES
"Diabetes Self-Management Education & Support    Presents for: Individual review    SUBJECTIVE/OBJECTIVE:  Presents for: Individual review  Accompanied by: Self  Diabetes education in the past 24mo: No  Focus of Visit: Assistance w/ making life changes, Healthy Eating, Reducing Risks, Monitoring, Being Active, Taking Medication, Problem Solving  Diabetes type: Type 2  Date of diagnosis: 2018  Disease course: Improving  How confident are you filling out medical forms by yourself:: Extremely  Diabetes management related comments/concerns: Everything is new to him.  No previous education.  Transportation concerns: No  Difficulty affording diabetes medication?: No  Difficulty affording diabetes testing supplies?: No  Other concerns:: None  Cultural Influences/Ethnic Background:  American    Diabetes Symptoms & Complications:  Fatigue: Yes  Neuropathy: Yes  Polydipsia: No  Polyphagia: No  Polyuria: No  Visual change: No  Slow healing wounds: No  Symptom course: Stable  Weight trend: Decreasing(Weight is down 11.5# since last visit 10/22.)  Complications assessed today?: No  CVA: No  Heart disease: No  Peripheral neuropathy: No    Patient Problem List and Family Medical History reviewed for relevant medical history, current medical status, and diabetes risk factors.    Vitals:  /78 (BP Location: Left arm, Patient Position: Sitting, Cuff Size: Adult Large)   Pulse 98   Ht 1.791 m (5' 10.5\")   Wt 141.5 kg (312 lb)   SpO2 95%   BMI 44.13 kg/m    Estimated body mass index is 44.13 kg/m  as calculated from the following:    Height as of this encounter: 1.791 m (5' 10.5\").    Weight as of this encounter: 141.5 kg (312 lb).   Last 3 BP:   BP Readings from Last 3 Encounters:   12/02/20 132/78   10/26/20 (!) 151/89   10/22/20 162/74       History   Smoking Status     Never Smoker   Smokeless Tobacco     Former User     Types: Snuff     Quit date: 10/1/2015       Labs:  Lab Results   Component Value Date    A1C 8.5 " 10/14/2020     Lab Results   Component Value Date     10/14/2020     Lab Results   Component Value Date    LDL 58 07/13/2020     HDL Cholesterol   Date Value Ref Range Status   07/13/2020 35 (L) >39 mg/dL Final   ]  GFR Estimate   Date Value Ref Range Status   10/14/2020 >90 >60 mL/min/[1.73_m2] Final     Comment:     Non  GFR Calc  Starting 12/18/2018, serum creatinine based estimated GFR (eGFR) will be   calculated using the Chronic Kidney Disease Epidemiology Collaboration   (CKD-EPI) equation.       GFR Estimate If Black   Date Value Ref Range Status   10/14/2020 >90 >60 mL/min/[1.73_m2] Final     Comment:      GFR Calc  Starting 12/18/2018, serum creatinine based estimated GFR (eGFR) will be   calculated using the Chronic Kidney Disease Epidemiology Collaboration   (CKD-EPI) equation.       Lab Results   Component Value Date    CR 0.74 10/14/2020     No results found for: MICROALBUMIN    Healthy Eating:  Healthy Eating Assessed Today: Yes  Cultural/Confucianism diet restrictions?: No  Meal planning/habits: None, Avoiding sweets, Carb counting  How many times a week on average do you eat food made away from home (restaurant/take-out)?: 0  Meals include: Breakfast, Lunch, Dinner  Breakfast: 3 eggs with cheese with ham or turkey - water or diet mountain dew.  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 : Yes  Blood  Glucose Meter: ContourNext  Times checking blood sugar at home (number): 2  Times checking blood sugar at home (per): Day  Blood glucose trend: Decreasing  Fasting-151, 165, 159, 149, 159, 153, 160  2 hours post supper-138, 195, 253, 175, 180  One week average is 170.     Taking Medications:  Diabetes Medication(s)     Biguanides       metFORMIN (GLUCOPHAGE-XR) 500 MG 24 hr tablet    TAKE 2 TABLETS BY MOUTH 2 TIMES A DAY WITH MEALS    Sulfonylureas       glimepiride (AMARYL) 2 MG tablet    Take two tablets ( 4 mg ) by mouth in the morning and one tablet (2 mg) in the evening with meals.    Incretin Mimetic Agents (GLP-1 Receptor Agonists)       liraglutide (VICTOZA) 18 MG/3ML solution    Inject 1.2 mg Subcutaneous daily        Taking Medication Assessed Today: Yes  Current Treatments: Oral Medication (taken by mouth), Non-insulin Injectables, Diet(Metformin ER 1000 mg bid, Glimepiride 4 mg am/2 mg evening, Victoza 1.2 mg daily)  Problems taking diabetes medications regularly?: No  Diabetes medication side effects?: No    Problem Solving:  Problem Solving Assessed Today: Yes  Is the patient at risk for hypoglycemia?: Yes  Hypoglycemia Frequency: Rarely(May occur when glucose levels in 90's.)  Hypoglycemia Treatment: Other food  Is the patient at risk for DKA?: No    Hypoglycemia symptoms  Feeling shaky: Yes    Reducing Risks:  Reducing Risks Assessed Today: No  Diabetes Risks: Family History, Sedentary Lifestyle  CAD Risks: Diabetes Mellitus, Male sex, Obesity, Sedentary lifestyle  Has dilated eye exam at least once a year?: Yes  Sees dentist every 6 months?: Yes  Feet checked by healthcare provider in the last year?: Yes    Healthy Coping:  Healthy Coping Assessed Today: Yes  Emotional response to diabetes: Ready to learn, Acceptance, Concern for health and well-being  Stage of change: ACTION (Actively working towards change)  Patient Activation Measure Survey Score:  TED Score (Last Two) 10/11/2018   TED Raw Score  30   Activation Score 56   TED Level 3     Diabetes knowledge and skills assessment:   Patient is knowledgeable in diabetes management concepts related to: Healthy Eating, Monitoring and Taking Medication    Patient needs further education on the following diabetes management concepts: Being Active, Problem Solving, Reducing Risks and Healthy Coping    Based on learning assessment above, most appropriate setting for further diabetes education would be: Individual setting.      INTERVENTIONS:    Education provided today on:  AADE Self-Care Behaviors:  Being Active: relationship to blood glucose and benefits in promoting weight loss  Problem Solving: high blood glucose - causes, signs/symptoms, treatment and prevention, low blood glucose - causes, signs/symptoms, treatment and prevention, when to call health care provider and sick day arrangements    Opportunities for ongoing education and support in diabetes-self management were discussed.    Pt verbalized understanding of concepts discussed and recommendations provided today.       Education Materials Provided:  No new materials today.     ASSESSMENT:  Pt is here today for diabetes follow up.  Glucose levels are trending down but not yet to target.  Pt has made many positive changes to his diet per diet recall.  Weight is down 11.5# since initial visit at the end of October.      Goals Addressed as of 12/2/2020                 Today    10/22/20      Weight (lb) < 90.7 kg (200 lb)   141.5 kg (312 lb)  146.8 kg (323 lb 9.6 oz)    Added 12/2/20 by Harmony Bird RD        Patient's most recent   Lab Results   Component Value Date    A1C 8.5 10/14/2020    is not meeting goal of <7.0    PLAN  Continue efforts to eat a healthy, low carbohydrate diet.  Try to add some more routine exercise.   Test glucose 2x/day - fasting and 2 hours post supper.  Will speak with provider regarding possible increase dose of Victoza with hx of pancreatitis r/t hydrochlorothiazide.    Will  notify provider of pt's BP.  He has not started taking Metoprolol as of yet.  Pt has been checking BP at home and BP was in target on recheck today.   See Patient Instructions for co-developed, patient-stated behavior change goals.  AVS printed and provided to patient today.   Follow up in approximately one month.     Time Spent: 45 minutes  Encounter Type: Individual    Any diabetes medication dose changes were made via the CDE Protocol and Collaborative Practice Agreement with the patient's referring provider. A copy of this encounter was shared with the provider.

## 2020-12-02 NOTE — PATIENT INSTRUCTIONS
-Keep following healthy, low carbohydrate meal plan.  You are doing a great job!  -Try to add some routine exercise.  -Keep testing 2x/day - fasting and 2 hours after a meal.   -Target levels are fasting  and 2 hours after a meal less than 180.  -Average on meter goal is <150.   -I will call you once I speak with Dr. De Santiago regarding your BP medication and increasing dose of Victoza.   -Follow up in approximately one month.   -Call with any concerns - MICHAEL Aviles, [phone].   
Darryl Dean

## 2020-12-02 NOTE — LETTER
"    12/2/2020        RE: Bucky Franco  [address]  [place] [address]        Diabetes Self-Management Education & Support    Presents for: Individual review    SUBJECTIVE/OBJECTIVE:  Presents for: Individual review  Accompanied by: Self  Diabetes education in the past 24mo: No  Focus of Visit: Assistance w/ making life changes, Healthy Eating, Reducing Risks, Monitoring, Being Active, Taking Medication, Problem Solving  Diabetes type: Type 2  Date of diagnosis: 2018  Disease course: Improving  How confident are you filling out medical forms by yourself:: Extremely  Diabetes management related comments/concerns: Everything is new to him.  No previous education.  Transportation concerns: No  Difficulty affording diabetes medication?: No  Difficulty affording diabetes testing supplies?: No  Other concerns:: None  Cultural Influences/Ethnic Background:  American    Diabetes Symptoms & Complications:  Fatigue: Yes  Neuropathy: Yes  Polydipsia: No  Polyphagia: No  Polyuria: No  Visual change: No  Slow healing wounds: No  Symptom course: Stable  Weight trend: Decreasing(Weight is down 11.5# since last visit 10/22.)  Complications assessed today?: No  CVA: No  Heart disease: No  Peripheral neuropathy: No    Patient Problem List and Family Medical History reviewed for relevant medical history, current medical status, and diabetes risk factors.    Vitals:  /78 (BP Location: Left arm, Patient Position: Sitting, Cuff Size: Adult Large)   Pulse 98   Ht 1.791 m (5' 10.5\")   Wt 141.5 kg (312 lb)   SpO2 95%   BMI 44.13 kg/m    Estimated body mass index is 44.13 kg/m  as calculated from the following:    Height as of this encounter: 1.791 m (5' 10.5\").    Weight as of this encounter: 141.5 kg (312 lb).   Last 3 BP:   BP Readings from Last 3 Encounters:   12/02/20 132/78   10/26/20 (!) 151/89   10/22/20 162/74       History   Smoking Status     Never Smoker   Smokeless Tobacco     Former User     Types: Snuff     " Quit date: 10/1/2015       Labs:  Lab Results   Component Value Date    A1C 8.5 10/14/2020     Lab Results   Component Value Date     10/14/2020     Lab Results   Component Value Date    LDL 58 07/13/2020     HDL Cholesterol   Date Value Ref Range Status   07/13/2020 35 (L) >39 mg/dL Final   ]  GFR Estimate   Date Value Ref Range Status   10/14/2020 >90 >60 mL/min/[1.73_m2] Final     Comment:     Non  GFR Calc  Starting 12/18/2018, serum creatinine based estimated GFR (eGFR) will be   calculated using the Chronic Kidney Disease Epidemiology Collaboration   (CKD-EPI) equation.       GFR Estimate If Black   Date Value Ref Range Status   10/14/2020 >90 >60 mL/min/[1.73_m2] Final     Comment:      GFR Calc  Starting 12/18/2018, serum creatinine based estimated GFR (eGFR) will be   calculated using the Chronic Kidney Disease Epidemiology Collaboration   (CKD-EPI) equation.       Lab Results   Component Value Date    CR 0.74 10/14/2020     No results found for: MICROALBUMIN    Healthy Eating:  Healthy Eating Assessed Today: Yes  Cultural/Evangelical diet restrictions?: No  Meal planning/habits: None, Avoiding sweets, Carb counting  How many times a week on average do you eat food made away from home (restaurant/take-out)?: 0  Meals include: Breakfast, Lunch, Dinner  Breakfast: 3 eggs with cheese with ham or turkey - water or diet mountain dew.  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 : Yes  Blood Glucose Meter: ContourNext  Times checking blood sugar at home (number): 2  Times checking blood sugar at home (per): Day  Blood glucose trend: Decreasing  Fasting-151, 165, 159, 149, 159, 153, 160  2 hours post supper-138, 195, 253, 175, 180  One week average is 170.     Taking Medications:  Diabetes Medication(s)     Biguanides       metFORMIN (GLUCOPHAGE-XR) 500 MG 24 hr tablet    TAKE 2 TABLETS BY MOUTH 2 TIMES A DAY WITH MEALS    Sulfonylureas       glimepiride (AMARYL) 2 MG tablet    Take two tablets ( 4 mg ) by mouth in the morning and one tablet (2 mg) in the evening with meals.    Incretin Mimetic Agents (GLP-1 Receptor Agonists)       liraglutide (VICTOZA) 18 MG/3ML solution    Inject 1.2 mg Subcutaneous daily        Taking Medication Assessed Today: Yes  Current Treatments: Oral Medication (taken by mouth), Non-insulin Injectables, Diet(Metformin ER 1000 mg bid, Glimepiride 4 mg am/2 mg evening, Victoza 1.2 mg daily)  Problems taking diabetes medications regularly?: No  Diabetes medication side effects?: No    Problem Solving:  Problem Solving Assessed Today: Yes  Is the patient at risk for hypoglycemia?: Yes  Hypoglycemia Frequency: Rarely(May occur when glucose levels in 90's.)  Hypoglycemia Treatment: Other food  Is the patient at risk for DKA?: No    Hypoglycemia symptoms  Feeling shaky: Yes    Reducing Risks:  Reducing Risks Assessed Today: No  Diabetes Risks: Family History, Sedentary Lifestyle  CAD Risks: Diabetes Mellitus, Male sex, Obesity, Sedentary lifestyle  Has dilated eye exam at least once a year?: Yes  Sees dentist every 6 months?: Yes  Feet checked by healthcare provider in the last year?: Yes    Healthy Coping:  Healthy Coping Assessed Today: Yes  Emotional response to diabetes: Ready to learn, Acceptance, Concern for health and well-being  Stage of change: ACTION (Actively working towards change)  Patient  Activation Measure Survey Score:  TED Score (Last Two) 10/11/2018   TED Raw Score 30   Activation Score 56   TED Level 3     Diabetes knowledge and skills assessment:   Patient is knowledgeable in diabetes management concepts related to: Healthy Eating, Monitoring and Taking Medication    Patient needs further education on the following diabetes management concepts: Being Active, Problem Solving, Reducing Risks and Healthy Coping    Based on learning assessment above, most appropriate setting for further diabetes education would be: Individual setting.      INTERVENTIONS:    Education provided today on:  AADE Self-Care Behaviors:  Being Active: relationship to blood glucose and benefits in promoting weight loss  Problem Solving: high blood glucose - causes, signs/symptoms, treatment and prevention, low blood glucose - causes, signs/symptoms, treatment and prevention, when to call health care provider and sick day arrangements    Opportunities for ongoing education and support in diabetes-self management were discussed.    Pt verbalized understanding of concepts discussed and recommendations provided today.       Education Materials Provided:  No new materials today.     ASSESSMENT:  Pt is here today for diabetes follow up.  Glucose levels are trending down but not yet to target.  Pt has made many positive changes to his diet per diet recall.  Weight is down 11.5# since initial visit at the end of October.      Goals Addressed as of 12/2/2020                 Today    10/22/20      Weight (lb) < 90.7 kg (200 lb)   141.5 kg (312 lb)  146.8 kg (323 lb 9.6 oz)    Added 12/2/20 by Harmony Bird RD        Patient's most recent   Lab Results   Component Value Date    A1C 8.5 10/14/2020    is not meeting goal of <7.0    PLAN  Continue efforts to eat a healthy, low carbohydrate diet.  Try to add some more routine exercise.   Test glucose 2x/day - fasting and 2 hours post supper.  Will speak with provider regarding possible  increase dose of Victoza with hx of pancreatitis r/t hydrochlorothiazide.    Will notify provider of pt's BP.  He has not started taking Metoprolol as of yet.  Pt has been checking BP at home and BP was in target on recheck today.   See Patient Instructions for co-developed, patient-stated behavior change goals.  AVS printed and provided to patient today.   Follow up in approximately one month.     Time Spent: 45 minutes  Encounter Type: Individual    Any diabetes medication dose changes were made via the CDE Protocol and Collaborative Practice Agreement with the patient's referring provider. A copy of this encounter was shared with the provider.          Sincerely,        Harmony Bird RD

## 2020-12-03 ENCOUNTER — TELEPHONE (OUTPATIENT)
Dept: FAMILY MEDICINE | Facility: OTHER | Age: 45
End: 2020-12-03

## 2020-12-07 ENCOUNTER — TELEPHONE (OUTPATIENT)
Dept: EDUCATION SERVICES | Facility: HOSPITAL | Age: 45
End: 2020-12-07

## 2020-12-07 NOTE — TELEPHONE ENCOUNTER
Harmony Bird RD   Registered Dietitian   Specialty:  Diabetes Education   Telephone Encounter   Signed   Encounter Date:  12/2/2020                    []Hide copied text    []Lisandro for details  Pt was here today for diabetes follow up.  Current diabetes medications are: Metformin ER 1000 mg bid, Glimepiride 4 mg am/2 mg evening, Victoza 1.2 mg daily.  Current glucose levels improved but not yet to target:  Fasting-151, 165, 159, 149, 159, 153, 160  2 hours post supper-138, 195, 253, 175, 180  Pt would benefit from increase Victoza dose to 1.8 mg daily but now note hx of possible pancreatitis 4/2019 r/t hydrochlorothiazide.  Is it okay to increase Victoza or for him to even be on it?  He is tolerating without issues.       Also noted today that Metoprolol was ordered on 10/27 for pt's BP but he was unaware apparently and has not picked it up.  He feels BP better and wants to know if he should take it.  BP as follows (he tests 3x/day in am - one minute apart):  11/19:  133/89, 141/91, 134/82  11/20:  153/87, 141/89, 140/82  11/22:  130/78, 131/80, 137/75  11/23:  121/83, 122/81, 129/76  11/24:  145/85, 127/81, 142/80  11/25:  143/86, 128/83, 137/85  11/30:  131/81, 124/79, 119/79  12/1:    137/83, 121/83, 123/81  12/2:    138/84, 139/85, 132/84     BP today at appointment was 154/82 and 132/78 on recheck.    I can let him know if you would like him to start Metoprolol.    Thanks!          Telephone on 12/2/2020        Detailed Report

## 2020-12-08 NOTE — TELEPHONE ENCOUNTER
Notified pt he should increase Victoza to 1.8 mg daily and hold off on Metoprolol for now.  Follow up is scheduled on Jan 14th.

## 2021-01-12 NOTE — PROGRESS NOTES
"  Assessment & Plan     Encounter to establish care      Type 2 diabetes mellitus without complication, without long-term current use of insulin (H)  Bucky has been working on diet and exercise resulting in weight loss  A1c 5.8 which is a significant decrease. Concern for low BG  - Hemoglobin A1c  - c/w liraglutide (VICTOZA) 18 MG/3ML solution; Inject 1.8 mg Subcutaneous daily  - c/w glimepiride 4mg + 2mg w/ dinner, consideration for decreasing if having episodes of hypoglycemia   - c/w metformin XR 1000mg bid   - on ASA, ACE, statin  - follows with diabetic education    Benign essential hypertension  BP slightly above goal  - start metoprolol succinate 12.5mg  - c/w diltiazem 240 + 120mg once per day  - c/w lisinopril      Mixed hyperlipidemia  LDL (7/13/2020): 58  - c/w Crestor 10mg    Back muscle spasm  Conservative tx  - consideration for a roller ball to massage muscle spasms    Consideration for keloidal folliculitis   H/o tx w/ abx (bactrim) d/t dx of folliculitis w/o improvement of rash  Consideration for acne keloidalis nuchae  - high potency steroid (fluocinonide 0.05, Lidex) 2 weeks on, 2 weeks off + tretinoin 0.025%  - if no improvement, dermatology referral   - discuss at next visit and re-examination of area    956}     BMI:   Estimated body mass index is 43.5 kg/m  as calculated from the following:    Height as of 12/2/20: 1.791 m (5' 10.5\").    Weight as of this encounter: 139.5 kg (307 lb 8 oz).   Weight management plan: Discussed healthy diet and exercise guidelines      Total time for today's visit was 45 minutes including review of internal records, labs, also face to face with patient discussing options and after visit ordering /reviewing tests and review of specialty notes (diabetic education).      See Patient Instructions    Return in about 3 months (around 4/14/2021) for BP Recheck, Diabetic Visit.    Naomi De Santiago MD  [facility] - BRITTANY Lawler is a 45 " year old who presents to clinic today for the following health issues    HPI       New Patient/Transfer of Care: Followed with Dr. Beal with last visit 10/14/2020    Diabetes Follow-up    How often are you checking your blood sugar? Two times daily  Blood sugar testing frequency justification:  Uncontrolled diabetes  What time of day are you checking your blood sugars (select all that apply)?  Before meals and 2 hours after dinner  Have you had any blood sugars above 200?  No  Have you had any blood sugars below 70?  No    What symptoms do you notice when your blood sugar is low?  Shaky and Weak    What concerns do you have today about your diabetes? None     Do you have any of these symptoms? (Select all that apply)  Numbness in feet    - last A1c (10/14/2020): 8.5  - BG: Morning 130s to 140s. Evenings under 180s  - Diet: improving d/t check BG consistent. Making his own food instead of eating out for lunch   - Exercise: has two najera retrievers   - glimepiride 4mg + 2mg w/ dinner  - metformin XR 1000mg bid   - victoza 1.8mg. h/o pancreatitis d/t hydrochlorothiazide but no issues with victoza  - on ASA, ACE, statin  - last eye exam (12/2/2020): negative  - follows with diabetic education with next visit 1/14/2021 (today)    BP Readings from Last 2 Encounters:   01/14/21 (!) 148/80   12/02/20 132/78     Hemoglobin A1C (%)   Date Value   10/14/2020 8.5 (H)   07/13/2020 7.9 (H)     LDL Cholesterol Calculated (mg/dL)   Date Value   07/13/2020 58   01/10/2019 50     Wt Readings from Last 4 Encounters:   01/14/21 139.5 kg (307 lb 8 oz)   12/02/20 141.5 kg (312 lb)   10/22/20 146.8 kg (323 lb 9.6 oz)   10/14/20 147.1 kg (324 lb 6.4 oz)         Hypertension Follow-up      Do you check your blood pressure regularly outside of the clinic? Yes     Are you following a low salt diet? Yes    Are your blood pressures ever more than 140 on the top number (systolic) OR more   than 90 on the bottom number (diastolic), for example  140/90? Yes    - diltiazem 240 +120 mg   - lisinopril 40mg   - metoprolol succinate 12.5mg not started, but will start today  - checks blood pressure at home and generally SBP high 130s    # HLD: no issues with medications  LDL (7/13/2020): 58  - Crestor 10mg      Review of Systems   Constitutional: Negative for chills and fever.   HENT: Positive for congestion. Negative for ear pain, hearing loss and sore throat.    Eyes: Negative for pain and visual disturbance.   Respiratory: Negative for cough and shortness of breath.    Cardiovascular: Negative for chest pain, palpitations and peripheral edema.   Gastrointestinal: Negative for abdominal pain, constipation, diarrhea, heartburn, hematochezia and nausea.   Genitourinary: Negative for discharge, dysuria, frequency, genital sores, hematuria, impotence and urgency.   Musculoskeletal: Positive for back pain. Negative for arthralgias, joint swelling and myalgias.   Skin: Positive for rash.   Neurological: Negative for dizziness, weakness, headaches and paresthesias.   Psychiatric/Behavioral: Negative for mood changes. The patient is not nervous/anxious.           Objective    BP (!) 148/80 (BP Location: Left arm, Patient Position: Chair, Cuff Size: Adult Large)   Pulse 85   Temp 97.6  F (36.4  C) (Tympanic)   Resp 18   Wt 139.5 kg (307 lb 8 oz)   SpO2 95%   BMI 43.50 kg/m    Body mass index is 43.5 kg/m .  Physical Exam  Constitutional:       General: He is not in acute distress.     Appearance: He is well-developed.   HENT:      Head: Normocephalic and atraumatic.      Right Ear: Tympanic membrane normal.      Left Ear: Tympanic membrane normal.      Mouth/Throat:      Mouth: Mucous membranes are moist.      Pharynx: No oropharyngeal exudate.   Eyes:      Extraocular Movements: Extraocular movements intact.      Conjunctiva/sclera: Conjunctivae normal.      Pupils: Pupils are equal, round, and reactive to light.   Neck:      Musculoskeletal: Normal range of  motion and neck supple.      Thyroid: No thyromegaly.   Cardiovascular:      Rate and Rhythm: Normal rate and regular rhythm.      Pulses: Normal pulses.      Heart sounds: No murmur.   Pulmonary:      Effort: Pulmonary effort is normal. No respiratory distress.      Breath sounds: No wheezing or rales.   Abdominal:      General: Bowel sounds are normal. There is no distension.      Palpations: Abdomen is soft.      Tenderness: There is no abdominal tenderness. There is no guarding.   Musculoskeletal: Normal range of motion.      Comments: Back: muscle spasm of left rhomboid   Lymphadenopathy:      Cervical: No cervical adenopathy.   Skin:     General: Skin is warm and dry.             Comments: Back of occipital: scattered nodules    Neurological:      Mental Status: He is alert.      Deep Tendon Reflexes: Reflexes normal.   Psychiatric:         Mood and Affect: Mood normal.          Results for orders placed or performed in visit on 01/14/21 (from the past 24 hour(s))   Hemoglobin A1c   Result Value Ref Range    Hemoglobin A1C 5.8 (H) 0 - 5.6 %        Recent Labs   Lab Test 07/13/20  1020 01/10/19  0942   CHOL 126 132   HDL 35* 35*   LDL 58 50   TRIG 166* 234*

## 2021-01-14 ENCOUNTER — HOSPITAL ENCOUNTER (OUTPATIENT)
Dept: EDUCATION SERVICES | Facility: HOSPITAL | Age: 46
End: 2021-01-14
Attending: FAMILY MEDICINE
Payer: COMMERCIAL

## 2021-01-14 ENCOUNTER — OFFICE VISIT (OUTPATIENT)
Dept: FAMILY MEDICINE | Facility: OTHER | Age: 46
End: 2021-01-14
Attending: FAMILY MEDICINE
Payer: COMMERCIAL

## 2021-01-14 ENCOUNTER — APPOINTMENT (OUTPATIENT)
Dept: LAB | Facility: OTHER | Age: 46
End: 2021-01-14
Attending: FAMILY MEDICINE
Payer: COMMERCIAL

## 2021-01-14 VITALS
RESPIRATION RATE: 18 BRPM | HEIGHT: 72 IN | SYSTOLIC BLOOD PRESSURE: 134 MMHG | OXYGEN SATURATION: 95 % | HEART RATE: 85 BPM | DIASTOLIC BLOOD PRESSURE: 83 MMHG | BODY MASS INDEX: 41.69 KG/M2 | WEIGHT: 307.8 LBS

## 2021-01-14 VITALS
RESPIRATION RATE: 18 BRPM | HEART RATE: 85 BPM | BODY MASS INDEX: 43.5 KG/M2 | OXYGEN SATURATION: 95 % | SYSTOLIC BLOOD PRESSURE: 148 MMHG | DIASTOLIC BLOOD PRESSURE: 80 MMHG | WEIGHT: 307.5 LBS | TEMPERATURE: 97.6 F

## 2021-01-14 DIAGNOSIS — Z76.89 ENCOUNTER TO ESTABLISH CARE: Primary | ICD-10-CM

## 2021-01-14 DIAGNOSIS — I10 BENIGN ESSENTIAL HYPERTENSION: ICD-10-CM

## 2021-01-14 DIAGNOSIS — E78.2 MIXED HYPERLIPIDEMIA: ICD-10-CM

## 2021-01-14 DIAGNOSIS — E11.9 TYPE 2 DIABETES MELLITUS WITHOUT COMPLICATION, WITHOUT LONG-TERM CURRENT USE OF INSULIN (H): ICD-10-CM

## 2021-01-14 DIAGNOSIS — E11.9 TYPE 2 DIABETES MELLITUS WITHOUT COMPLICATION, WITHOUT LONG-TERM CURRENT USE OF INSULIN (H): Primary | ICD-10-CM

## 2021-01-14 DIAGNOSIS — L73.0 KELOIDAL FOLLICULITIS: ICD-10-CM

## 2021-01-14 DIAGNOSIS — M62.830 BACK MUSCLE SPASM: ICD-10-CM

## 2021-01-14 LAB
EST. AVERAGE GLUCOSE BLD GHB EST-MCNC: 120 MG/DL
HBA1C MFR BLD: 5.8 % (ref 0–5.6)

## 2021-01-14 PROCEDURE — 83036 HEMOGLOBIN GLYCOSYLATED A1C: CPT | Performed by: FAMILY MEDICINE

## 2021-01-14 PROCEDURE — 36415 COLL VENOUS BLD VENIPUNCTURE: CPT | Performed by: FAMILY MEDICINE

## 2021-01-14 PROCEDURE — G0108 DIAB MANAGE TRN  PER INDIV: HCPCS | Performed by: DIETITIAN, REGISTERED

## 2021-01-14 PROCEDURE — 99215 OFFICE O/P EST HI 40 MIN: CPT | Performed by: FAMILY MEDICINE

## 2021-01-14 RX ORDER — LIRAGLUTIDE 6 MG/ML
1.8 INJECTION SUBCUTANEOUS DAILY
Start: 2021-01-14 | End: 2021-02-22

## 2021-01-14 ASSESSMENT — ENCOUNTER SYMPTOMS
HEMATURIA: 0
HEADACHES: 0
BACK PAIN: 1
NERVOUS/ANXIOUS: 0
NAUSEA: 0
COUGH: 0
DIARRHEA: 0
FEVER: 0
DYSURIA: 0
FREQUENCY: 0
HEARTBURN: 0
PALPITATIONS: 0
ARTHRALGIAS: 0
WEAKNESS: 0
CONSTIPATION: 0
CHILLS: 0
EYE PAIN: 0
SORE THROAT: 0
SHORTNESS OF BREATH: 0
DIZZINESS: 0
PARESTHESIAS: 0
ABDOMINAL PAIN: 0
JOINT SWELLING: 0
HEMATOCHEZIA: 0
MYALGIAS: 0

## 2021-01-14 ASSESSMENT — PAIN SCALES - GENERAL
PAINLEVEL: NO PAIN (0)
PAINLEVEL: NO PAIN (0)

## 2021-01-14 ASSESSMENT — MIFFLIN-ST. JEOR: SCORE: 2325.2

## 2021-01-14 NOTE — NURSING NOTE
"Chief Complaint   Patient presents with     Diabetes     Hypertension       Initial BP (!) 148/80 (BP Location: Left arm, Patient Position: Chair, Cuff Size: Adult Large)   Pulse 85   Temp 97.6  F (36.4  C) (Tympanic)   Resp 18   Wt 139.5 kg (307 lb 8 oz)   SpO2 95%   BMI 43.50 kg/m   Estimated body mass index is 43.5 kg/m  as calculated from the following:    Height as of 12/2/20: 1.791 m (5' 10.5\").    Weight as of this encounter: 139.5 kg (307 lb 8 oz).  Medication Reconciliation: complete  Emmanuelle Del Castillo LPN  "

## 2021-01-14 NOTE — PROGRESS NOTES
"Chief Complaint   Patient presents with     Diabetes Education     Session 3 / BG review.       Initial Resp 18   Ht 1.822 m (5' 11.75\")   Wt 139.6 kg (307 lb 12.8 oz)   SpO2 95%   BMI 42.04 kg/m   Estimated body mass index is 42.04 kg/m  as calculated from the following:    Height as of this encounter: 1.822 m (5' 11.75\").    Weight as of this encounter: 139.6 kg (307 lb 12.8 oz).  Medication Reconciliation: complete  MARYURI OCONNOR LPN    "

## 2021-01-14 NOTE — PROGRESS NOTES
"Diabetes Self-Management Education & Support    Presents for: Individual review    SUBJECTIVE/OBJECTIVE:  Presents for: Individual review  Accompanied by: Self  Diabetes education in the past 24mo: Yes  Focus of Visit: Assistance w/ making life changes, Healthy Eating, Reducing Risks, Monitoring, Being Active, Taking Medication, Problem Solving  Diabetes type: Type 2  Date of diagnosis: 2018  Disease course: Improving  How confident are you filling out medical forms by yourself:: Extremely  Diabetes management related comments/concerns: No concerns today.  Transportation concerns: No  Difficulty affording diabetes medication?: No  Difficulty affording diabetes testing supplies?: No  Other concerns:: None  Cultural Influences/Ethnic Background:  American    Diabetes Symptoms & Complications:  Fatigue: Yes  Neuropathy: Yes  Polydipsia: No  Polyphagia: No  Polyuria: No  Visual change: No  Slow healing wounds: No  Symptom course: Stable  Weight trend: Decreasing(Weight is down 5# in the past month, down 16# since initial visit in October.)  Complications assessed today?: No  CVA: No  Heart disease: No  Peripheral neuropathy: No    Patient Problem List and Family Medical History reviewed for relevant medical history, current medical status, and diabetes risk factors.    Vitals:  /83 (BP Location: Right arm, Patient Position: Sitting, Cuff Size: Adult Large)   Pulse 85   Resp 18   Ht 1.822 m (5' 11.75\")   Wt 139.6 kg (307 lb 12.8 oz)   SpO2 95%   BMI 42.04 kg/m    Estimated body mass index is 42.04 kg/m  as calculated from the following:    Height as of this encounter: 1.822 m (5' 11.75\").    Weight as of this encounter: 139.6 kg (307 lb 12.8 oz).   Last 3 BP:   BP Readings from Last 3 Encounters:   01/14/21 (!) 148/80   01/14/21 134/83   12/02/20 132/78       History   Smoking Status     Never Smoker   Smokeless Tobacco     Former User     Types: Snuff     Quit date: 10/1/2015       Labs:  Lab Results "   Component Value Date    A1C 5.8 01/14/2021     Lab Results   Component Value Date     10/14/2020     Lab Results   Component Value Date    LDL 58 07/13/2020     HDL Cholesterol   Date Value Ref Range Status   07/13/2020 35 (L) >39 mg/dL Final   ]  GFR Estimate   Date Value Ref Range Status   10/14/2020 >90 >60 mL/min/[1.73_m2] Final     Comment:     Non  GFR Calc  Starting 12/18/2018, serum creatinine based estimated GFR (eGFR) will be   calculated using the Chronic Kidney Disease Epidemiology Collaboration   (CKD-EPI) equation.       GFR Estimate If Black   Date Value Ref Range Status   10/14/2020 >90 >60 mL/min/[1.73_m2] Final     Comment:      GFR Calc  Starting 12/18/2018, serum creatinine based estimated GFR (eGFR) will be   calculated using the Chronic Kidney Disease Epidemiology Collaboration   (CKD-EPI) equation.       Lab Results   Component Value Date    CR 0.74 10/14/2020     No results found for: MICROALBUMIN    Healthy Eating:  Healthy Eating Assessed Today: Yes  Cultural/Mosque diet restrictions?: No  Meal planning/habits: None, Avoiding sweets, Carb counting  How many times a week on average do you eat food made away from home (restaurant/take-out)?: 0  Meals include: Breakfast, Lunch, Dinner  Breakfast: 3 eggs with cheese with ham or turkey - water or diet mountain dew.  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 : Yes  Blood Glucose Meter: ContourNext  Times checking blood sugar at home (number): 2  Times checking blood sugar at home (per): Day  Blood glucose trend: Decreasing  [id number]  2 hours post [id number] with one at 230  Two week average is 142.    Taking Medications:  Diabetes Medication(s)     Biguanides       metFORMIN (GLUCOPHAGE-XR) 500 MG 24 hr tablet    TAKE 2 TABLETS BY MOUTH 2 TIMES A DAY WITH MEALS    Sulfonylureas       glimepiride (AMARYL) 2 MG tablet    Take two tablets ( 4 mg ) by mouth in the morning and one tablet (2 mg) in the evening with meals.    Incretin Mimetic Agents (GLP-1 Receptor Agonists)       liraglutide (VICTOZA) 18 MG/3ML solution    Inject 1.8 mg Subcutaneous daily          Taking Medication Assessed Today: Yes  Current Treatments: Oral Medication (taken by mouth), Non-insulin Injectables, Diet(Metformin ER 1000 mg bid, Glimepiride 4 mg am/2 mg evening, Victoza 1.8 mg daily)  Problems taking diabetes medications regularly?: No  Diabetes medication side effects?: No    Problem Solving:  Problem Solving Assessed Today: Yes  Is the patient at risk for hypoglycemia?: Yes  Hypoglycemia Frequency: Rarely  Hypoglycemia Treatment: Other food  Is the patient at risk for DKA?: No    Hypoglycemia symptoms  Feeling shaky: Yes    Reducing Risks:  Reducing Risks Assessed Today: No  Diabetes Risks: Family History, Sedentary Lifestyle  CAD Risks: Diabetes Mellitus, Male sex, Obesity, Sedentary lifestyle  Has dilated eye exam at least once a year?: Yes  Sees dentist every 6 months?: Yes  Feet checked by healthcare provider in the last year?: Yes    Healthy Coping:  Healthy Coping Assessed Today: Yes  Emotional response to diabetes: Ready to learn, Acceptance, Concern for health and well-being  Stage of change: ACTION (Actively working towards change)  Patient Activation Measure Survey Score:  TED Score (Last Two) 10/11/2018   TED Raw Score 30   Activation Score 56   TED Level 3        Diabetes knowledge and skills assessment:   Patient is knowledgeable in diabetes management concepts related to: Healthy Eating, Being Active, Monitoring, Taking Medication, Problem Solving, Reducing Risks and Healthy Coping    Patient needs further education on the following diabetes management concepts: No concerns today.  Pt is doing well.     Based on learning assessment above, most appropriate setting for further diabetes education would be: Individual setting.      INTERVENTIONS:    Education provided today on:  AADE Self-Care Behaviors:  Being Active: relationship to blood glucose and benefits in promoting weight loss  Monitoring: individual blood glucose targets and frequency of monitoring  Problem Solving: low blood glucose - causes, signs/symptoms, treatment and prevention and when to call health care provider  Reducing Risks: major complications of diabetes and A1C - goals, relating to blood glucose levels, how often to check    Opportunities for ongoing education and support in diabetes-self management were discussed.    Pt verbalized understanding of concepts discussed and recommendations provided today.       Education Materials Provided:  Victoza savings card      ASSESSMENT:  A1c today in target at 5.8% which is down from 8.5% at last check in October.  Pt is pleased.  He has lost 16# since October 2020.  He is doing very well with meal planning.  Discussed importance of exercise.  Overall, he is doing very well.     Patient's most recent   Lab Results   Component Value Date    A1C 5.8 01/14/2021    is meeting goal of <7.0    PLAN  Continue efforts to eat a healthy, low carbohydrate diet.   Try to add some routine exercise.   Test glucose 1-2x/day.   Keep taking current diabetes medications.  Call if frequent s/s hypoglycemia occur so we can reduce Glimepiride.   See Patient Instructions for co-developed, patient-stated behavior change goals.  AVS printed and provided to patient today.   Follow  up May 14 - 8:30 am.     Time Spent: 40 minutes  Encounter Type: Individual    Any diabetes medication dose changes were made via the CDE Protocol and Collaborative Practice Agreement with the patient's referring provider. A copy of this encounter was shared with the provider.

## 2021-01-14 NOTE — PATIENT INSTRUCTIONS
Start your metoprolol succinate 12.5mg once per day.   Let us know what your blood pressure and heart rate readings are in one week.

## 2021-01-14 NOTE — PATIENT INSTRUCTIONS
-Keep trying to eat a healthy, low carbohydrate diet.   -Add some routine exercise when able.  Goal is 30 minutes most days of the week.  -Test 1-2x day.  Good times fasting and 2 hours after supper meal.  -Target levels are fasting  and 2 hours after supper less than 180.  -A1c today was 5.8% which is down from 8.5% in October.  Great job!  Goal is <7.0%.    -Weight today was 307#.  Weight is down 15# since October.  Keep up the good work!  -Follow up May 14th at 8:30 am.  -Call with any concerns - MICHAEL Aviles, [phone].

## 2021-01-14 NOTE — LETTER
"    1/14/2021        RE: Bucky Franco  [address] [facility] [place] [address]        Chief Complaint   Patient presents with     Diabetes Education     Session 3 / BG review.       Initial Resp 18   Ht 1.822 m (5' 11.75\")   Wt 139.6 kg (307 lb 12.8 oz)   SpO2 95%   BMI 42.04 kg/m   Estimated body mass index is 42.04 kg/m  as calculated from the following:    Height as of this encounter: 1.822 m (5' 11.75\").    Weight as of this encounter: 139.6 kg (307 lb 12.8 oz).  Medication Reconciliation: complete  MARYURI OCONNOR LPN      Diabetes Self-Management Education & Support    Presents for: Individual review    SUBJECTIVE/OBJECTIVE:  Presents for: Individual review  Accompanied by: Self  Diabetes education in the past 24mo: Yes  Focus of Visit: Assistance w/ making life changes, Healthy Eating, Reducing Risks, Monitoring, Being Active, Taking Medication, Problem Solving  Diabetes type: Type 2  Date of diagnosis: 2018  Disease course: Improving  How confident are you filling out medical forms by yourself:: Extremely  Diabetes management related comments/concerns: No concerns today.  Transportation concerns: No  Difficulty affording diabetes medication?: No  Difficulty affording diabetes testing supplies?: No  Other concerns:: None  Cultural Influences/Ethnic Background:  American    Diabetes Symptoms & Complications:  Fatigue: Yes  Neuropathy: Yes  Polydipsia: No  Polyphagia: No  Polyuria: No  Visual change: No  Slow healing wounds: No  Symptom course: Stable  Weight trend: Decreasing(Weight is down 5# in the past month, down 16# since initial visit in October.)  Complications assessed today?: No  CVA: No  Heart disease: No  Peripheral neuropathy: No    Patient Problem List and Family Medical History reviewed for relevant medical history, current medical status, and diabetes risk factors.    Vitals:  /83 (BP Location: Right arm, Patient Position: Sitting, Cuff Size: Adult Large)   Pulse 85   Resp 18   Ht " "1.822 m (5' 11.75\")   Wt 139.6 kg (307 lb 12.8 oz)   SpO2 95%   BMI 42.04 kg/m    Estimated body mass index is 42.04 kg/m  as calculated from the following:    Height as of this encounter: 1.822 m (5' 11.75\").    Weight as of this encounter: 139.6 kg (307 lb 12.8 oz).   Last 3 BP:   BP Readings from Last 3 Encounters:   01/14/21 (!) 148/80   01/14/21 134/83   12/02/20 132/78       History   Smoking Status     Never Smoker   Smokeless Tobacco     Former User     Types: Snuff     Quit date: 10/1/2015       Labs:  Lab Results   Component Value Date    A1C 5.8 01/14/2021     Lab Results   Component Value Date     10/14/2020     Lab Results   Component Value Date    LDL 58 07/13/2020     HDL Cholesterol   Date Value Ref Range Status   07/13/2020 35 (L) >39 mg/dL Final   ]  GFR Estimate   Date Value Ref Range Status   10/14/2020 >90 >60 mL/min/[1.73_m2] Final     Comment:     Non  GFR Calc  Starting 12/18/2018, serum creatinine based estimated GFR (eGFR) will be   calculated using the Chronic Kidney Disease Epidemiology Collaboration   (CKD-EPI) equation.       GFR Estimate If Black   Date Value Ref Range Status   10/14/2020 >90 >60 mL/min/[1.73_m2] Final     Comment:      GFR Calc  Starting 12/18/2018, serum creatinine based estimated GFR (eGFR) will be   calculated using the Chronic Kidney Disease Epidemiology Collaboration   (CKD-EPI) equation.       Lab Results   Component Value Date    CR 0.74 10/14/2020     No results found for: MICROALBUMIN    Healthy Eating:  Healthy Eating Assessed Today: Yes  Cultural/Baptism diet restrictions?: No  Meal planning/habits: None, Avoiding sweets, Carb counting  How many times a week on average do you eat food made away from home (restaurant/take-out)?: 0  Meals include: Breakfast, Lunch, Dinner  Breakfast: 3 eggs with cheese with ham or turkey - water or diet mountain dew.  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 : Yes  Blood Glucose Meter: ContourNext  Times checking blood sugar at home (number): 2  Times checking blood sugar at home (per): Day  Blood glucose trend: Decreasing  [id number]  2 hours post [id number] with one at 230  Two week average is 142.    Taking Medications:  Diabetes Medication(s)     Biguanides       metFORMIN (GLUCOPHAGE-XR) 500 MG 24 hr tablet    TAKE 2 TABLETS BY MOUTH 2 TIMES A DAY WITH MEALS    Sulfonylureas       glimepiride (AMARYL) 2 MG tablet    Take two tablets ( 4 mg ) by mouth in the morning and one tablet (2 mg) in the evening with meals.    Incretin Mimetic Agents (GLP-1 Receptor Agonists)       liraglutide (VICTOZA) 18 MG/3ML solution    Inject 1.8 mg Subcutaneous daily          Taking Medication Assessed Today: Yes  Current Treatments: Oral Medication (taken by mouth), Non-insulin Injectables, Diet(Metformin ER 1000 mg bid, Glimepiride 4 mg am/2 mg evening, Victoza 1.8 mg daily)  Problems taking diabetes medications regularly?: No  Diabetes medication side effects?: No    Problem Solving:  Problem Solving Assessed Today: Yes  Is the patient at risk for hypoglycemia?: Yes  Hypoglycemia Frequency: Rarely  Hypoglycemia Treatment: Other food  Is the patient at risk for DKA?: No    Hypoglycemia symptoms  Feeling shaky: Yes    Reducing Risks:  Reducing Risks Assessed Today: No  Diabetes Risks: Family History, Sedentary Lifestyle  CAD Risks:  Diabetes Mellitus, Male sex, Obesity, Sedentary lifestyle  Has dilated eye exam at least once a year?: Yes  Sees dentist every 6 months?: Yes  Feet checked by healthcare provider in the last year?: Yes    Healthy Coping:  Healthy Coping Assessed Today: Yes  Emotional response to diabetes: Ready to learn, Acceptance, Concern for health and well-being  Stage of change: ACTION (Actively working towards change)  Patient Activation Measure Survey Score:  TED Score (Last Two) 10/11/2018   TED Raw Score 30   Activation Score 56   TED Level 3       Diabetes knowledge and skills assessment:   Patient is knowledgeable in diabetes management concepts related to: Healthy Eating, Being Active, Monitoring, Taking Medication, Problem Solving, Reducing Risks and Healthy Coping    Patient needs further education on the following diabetes management concepts: No concerns today.  Pt is doing well.     Based on learning assessment above, most appropriate setting for further diabetes education would be: Individual setting.      INTERVENTIONS:    Education provided today on:  AADE Self-Care Behaviors:  Being Active: relationship to blood glucose and benefits in promoting weight loss  Monitoring: individual blood glucose targets and frequency of monitoring  Problem Solving: low blood glucose - causes, signs/symptoms, treatment and prevention and when to call health care provider  Reducing Risks: major complications of diabetes and A1C - goals, relating to blood glucose levels, how often to check    Opportunities for ongoing education and support in diabetes-self management were discussed.    Pt verbalized understanding of concepts discussed and recommendations provided today.       Education Materials Provided:  Victoza savings card      ASSESSMENT:  A1c today in target at 5.8% which is down from 8.5% at last check in October.  Pt is pleased.  He has lost 16# since October 2020.  He is doing very well with meal planning.  Discussed  importance of exercise.  Overall, he is doing very well.     Patient's most recent   Lab Results   Component Value Date    A1C 5.8 01/14/2021    is meeting goal of <7.0    PLAN  Continue efforts to eat a healthy, low carbohydrate diet.   Try to add some routine exercise.   Test glucose 1-2x/day.   Keep taking current diabetes medications.  Call if frequent s/s hypoglycemia occur so we can reduce Glimepiride.   See Patient Instructions for co-developed, patient-stated behavior change goals.  AVS printed and provided to patient today.   Follow up May 14 - 8:30 am.     Time Spent: 40 minutes  Encounter Type: Individual    Any diabetes medication dose changes were made via the CDE Protocol and Collaborative Practice Agreement with the patient's referring provider. A copy of this encounter was shared with the provider.          Sincerely,        Harmony Bird RD

## 2021-02-22 ENCOUNTER — MYC MEDICAL ADVICE (OUTPATIENT)
Dept: FAMILY MEDICINE | Facility: OTHER | Age: 46
End: 2021-02-22

## 2021-02-22 DIAGNOSIS — E11.9 TYPE 2 DIABETES MELLITUS WITHOUT COMPLICATION, WITHOUT LONG-TERM CURRENT USE OF INSULIN (H): ICD-10-CM

## 2021-02-22 RX ORDER — LIRAGLUTIDE 6 MG/ML
1.8 INJECTION SUBCUTANEOUS DAILY
Qty: 9 ML | Refills: 3 | Status: SHIPPED | OUTPATIENT
Start: 2021-02-22 | End: 2021-05-28

## 2021-03-30 DIAGNOSIS — E11.9 TYPE 2 DIABETES MELLITUS WITHOUT COMPLICATION, WITHOUT LONG-TERM CURRENT USE OF INSULIN (H): ICD-10-CM

## 2021-03-30 RX ORDER — METFORMIN HCL 500 MG
TABLET, EXTENDED RELEASE 24 HR ORAL
Qty: 360 TABLET | Refills: 0 | Status: SHIPPED | OUTPATIENT
Start: 2021-03-30 | End: 2021-06-29

## 2021-04-04 ENCOUNTER — HEALTH MAINTENANCE LETTER (OUTPATIENT)
Age: 46
End: 2021-04-04

## 2021-05-06 NOTE — PROGRESS NOTES
Assessment & Plan     Type 2 diabetes mellitus without complication, without long-term current use of insulin (H)  A1c 6.2 which is an increase. Work on diet and reduction of snacks  - Hemoglobin A1c  - c/w glimepiride 4mg morning, and 2mg evening  - metformin 1000mg bid  - victoza 1.8mg every day     Benign essential hypertension  BP at goal at home and close in the office  - c/w metoprolol succinate 12.5mg  - c/w diltiazem 240 + 120  - c/w lisinopril 40mg     Consideration for keloidal folliculitis   H/o tx w/ abx (bactrim) d/t dx of folliculitis w/o improvement of rash  Consideration for acne keloidalis nuchae  - high potency steroid (fluocinonide 0.05, Lidex) 2 weeks on, 2 weeks off + tretinoin 0.025%  - Bucky will send a picture if he has another flare    See Patient Instructions    Return in about 3 months (around 8/14/2021) for Diabetic Visit, Physical Exam, Lab Work.    Naomi De Santiago MD  [facility] - BRITTANY Lawler is a 45 year old who presents for the following health issues     HPI     Diabetes Follow-up    How often are you checking your blood sugar? Two times daily  Blood sugar testing frequency justification:  Uncontrolled diabetes  What time of day are you checking your blood sugars (select all that apply)?  Before meals and At bedtime  Have you had any blood sugars above 200?  Yes few  Have you had any blood sugars below 70?  No    What symptoms do you notice when your blood sugar is low?  Shaky, Weak and Lethargy    What concerns do you have today about your diabetes? None     Do you have any of these symptoms? (Select all that apply)  Numbness in feet and Burning in feet  - Last A1c (1/14/2021): 5.8  - BG: starting to go up this last month. Stress at job d/t increased work load. Up an average of 15 point   - Diet: possible more snacking d/t stress   - Exercise:  - glimepiride 4mg morning, 2mg evening  - metformin 1000mg bid  - victoza 1.8mg every day   - on ASA,  ACE (lisinopril), statin (crestor)  - last eye exam (12/2/2020): negative  - follows with diabetic education with next visit today      BP Readings from Last 2 Encounters:   05/14/21 (!) 140/78   01/14/21 134/83     Hemoglobin A1C (%)   Date Value   01/14/2021 5.8 (H)   10/14/2020 8.5 (H)     LDL Cholesterol Calculated (mg/dL)   Date Value   07/13/2020 58   01/10/2019 50     Hypertension Follow-up      Do you check your blood pressure regularly outside of the clinic? Yes     Are you following a low salt diet? Yes    Are your blood pressures ever more than 140 on the top number (systolic) OR more   than 90 on the bottom number (diastolic), for example 140/90? Yes    - started metoprolol succinate 12.5mg on 1/14/2021, no issues with medication except for splitting the pill  - diltiazem 240 + 120mg every day  - lisinopril   - BP at home 131/79 (highest reading)    Rash on back of neck  - looks good today      # Immunizations  - COVID 5/8/2021 completed  - due for hepB      Review of Systems   Constitutional: Negative for chills and fever.   HENT: Negative for congestion.    Respiratory: Negative for shortness of breath and wheezing.    Cardiovascular: Negative for chest pain and palpitations.   Gastrointestinal: Negative for abdominal pain.   Skin: Negative for rash.   Psychiatric/Behavioral:        Increased stress            Objective    BP (!) 140/78 (BP Location: Left arm, Patient Position: Chair, Cuff Size: Adult Large)   Pulse 68   Temp 97.6  F (36.4  C) (Tympanic)   Resp 18   Wt 138 kg (304 lb 4 oz)   SpO2 96%   BMI 41.55 kg/m    Body mass index is 41.55 kg/m .  Physical Exam  Constitutional:       General: He is not in acute distress.     Appearance: He is not ill-appearing.   Cardiovascular:      Rate and Rhythm: Normal rate and regular rhythm.      Pulses: Normal pulses.      Heart sounds: No murmur.   Pulmonary:      Effort: Pulmonary effort is normal. No respiratory distress.      Breath sounds: No  wheezing or rales.   Skin:     Comments: No rash on back of neck   Neurological:      Mental Status: He is alert.          Results for orders placed or performed in visit on 05/14/21 (from the past 24 hour(s))   Hemoglobin A1c   Result Value Ref Range    Hemoglobin A1C 6.2 (H) 0 - 5.6 %   Estimated Average Glucose   Result Value Ref Range    Estimated Average Glucose 131 mg/dL

## 2021-05-14 ENCOUNTER — OFFICE VISIT (OUTPATIENT)
Dept: FAMILY MEDICINE | Facility: OTHER | Age: 46
End: 2021-05-14
Attending: FAMILY MEDICINE
Payer: COMMERCIAL

## 2021-05-14 ENCOUNTER — HOSPITAL ENCOUNTER (OUTPATIENT)
Dept: EDUCATION SERVICES | Facility: HOSPITAL | Age: 46
End: 2021-05-14
Attending: NURSE PRACTITIONER
Payer: COMMERCIAL

## 2021-05-14 VITALS
DIASTOLIC BLOOD PRESSURE: 78 MMHG | SYSTOLIC BLOOD PRESSURE: 140 MMHG | BODY MASS INDEX: 41.55 KG/M2 | RESPIRATION RATE: 18 BRPM | TEMPERATURE: 97.6 F | WEIGHT: 304.25 LBS | OXYGEN SATURATION: 96 % | HEART RATE: 68 BPM

## 2021-05-14 VITALS
SYSTOLIC BLOOD PRESSURE: 140 MMHG | HEIGHT: 72 IN | HEART RATE: 68 BPM | RESPIRATION RATE: 18 BRPM | DIASTOLIC BLOOD PRESSURE: 78 MMHG | BODY MASS INDEX: 40.8 KG/M2 | WEIGHT: 301.25 LBS

## 2021-05-14 DIAGNOSIS — E11.9 TYPE 2 DIABETES MELLITUS WITHOUT COMPLICATION, WITHOUT LONG-TERM CURRENT USE OF INSULIN (H): ICD-10-CM

## 2021-05-14 DIAGNOSIS — I10 BENIGN ESSENTIAL HYPERTENSION: ICD-10-CM

## 2021-05-14 DIAGNOSIS — E11.9 TYPE 2 DIABETES MELLITUS WITHOUT COMPLICATION, WITHOUT LONG-TERM CURRENT USE OF INSULIN (H): Primary | ICD-10-CM

## 2021-05-14 DIAGNOSIS — Z23 NEED FOR VACCINATION AGAINST HEPATITIS B VIRUS: Primary | ICD-10-CM

## 2021-05-14 LAB
EST. AVERAGE GLUCOSE BLD GHB EST-MCNC: 131 MG/DL
HBA1C MFR BLD: 6.2 % (ref 0–5.6)

## 2021-05-14 PROCEDURE — 90471 IMMUNIZATION ADMIN: CPT | Performed by: FAMILY MEDICINE

## 2021-05-14 PROCEDURE — 99214 OFFICE O/P EST MOD 30 MIN: CPT | Mod: 25 | Performed by: FAMILY MEDICINE

## 2021-05-14 PROCEDURE — 36415 COLL VENOUS BLD VENIPUNCTURE: CPT | Performed by: FAMILY MEDICINE

## 2021-05-14 PROCEDURE — 83036 HEMOGLOBIN GLYCOSYLATED A1C: CPT | Performed by: FAMILY MEDICINE

## 2021-05-14 PROCEDURE — G0108 DIAB MANAGE TRN  PER INDIV: HCPCS | Performed by: DIETITIAN, REGISTERED

## 2021-05-14 PROCEDURE — 90746 HEPB VACCINE 3 DOSE ADULT IM: CPT | Performed by: FAMILY MEDICINE

## 2021-05-14 ASSESSMENT — ENCOUNTER SYMPTOMS
ABDOMINAL PAIN: 0
WHEEZING: 0
PALPITATIONS: 0
CHILLS: 0
FEVER: 0
SHORTNESS OF BREATH: 0

## 2021-05-14 ASSESSMENT — MIFFLIN-ST. JEOR: SCORE: 2295.49

## 2021-05-14 ASSESSMENT — PAIN SCALES - GENERAL
PAINLEVEL: NO PAIN (0)
PAINLEVEL: MODERATE PAIN (5)

## 2021-05-14 NOTE — LETTER
"    5/14/2021        RE: Bucky Franco  [address]   [place] [address]        Diabetes Self-Management Education & Support    Presents for: Individual review    SUBJECTIVE/OBJECTIVE:  Presents for: Individual review  Accompanied by: Self  Diabetes education in the past 24mo: Yes  Focus of Visit: Assistance w/ making life changes, Monitoring  Diabetes type: Type 2  Date of diagnosis: 2018  Disease course: Getting harder to manage  How confident are you filling out medical forms by yourself:: Extremely  Diabetes management related comments/concerns: Glucose levels are increasing some.  Transportation concerns: No  Difficulty affording diabetes medication?: No  Difficulty affording diabetes testing supplies?: No  Other concerns:: None  Cultural Influences/Ethnic Background:  American    Diabetes Symptoms & Complications:  Fatigue: Yes  Neuropathy: Yes  Polydipsia: No  Polyphagia: No  Polyuria: No  Visual change: No  Slow healing wounds: No  Symptom course: Stable  Weight trend: Decreasing(Down approximately 22# since October 2020.)  Complications assessed today?: No  CVA: No  Heart disease: No  Peripheral neuropathy: No    Patient Problem List and Family Medical History reviewed for relevant medical history, current medical status, and diabetes risk factors.    Vitals:  /78   Pulse 68   Resp 18   Ht 1.822 m (5' 11.75\")   Wt 136.6 kg (301 lb 4 oz)   BMI 41.14 kg/m    Estimated body mass index is 41.14 kg/m  as calculated from the following:    Height as of this encounter: 1.822 m (5' 11.75\").    Weight as of this encounter: 136.6 kg (301 lb 4 oz).   Last 3 BP:   BP Readings from Last 3 Encounters:   05/14/21 (!) 140/78   05/14/21 140/78   01/14/21 134/83       History   Smoking Status     Never Smoker   Smokeless Tobacco     Former User     Types: Snuff     Quit date: 10/1/2015       Labs:  Lab Results   Component Value Date    A1C 6.2 05/14/2021     Lab Results   Component Value Date     " 10/14/2020     Lab Results   Component Value Date    LDL 58 07/13/2020     HDL Cholesterol   Date Value Ref Range Status   07/13/2020 35 (L) >39 mg/dL Final   ]  GFR Estimate   Date Value Ref Range Status   10/14/2020 >90 >60 mL/min/[1.73_m2] Final     Comment:     Non  GFR Calc  Starting 12/18/2018, serum creatinine based estimated GFR (eGFR) will be   calculated using the Chronic Kidney Disease Epidemiology Collaboration   (CKD-EPI) equation.       GFR Estimate If Black   Date Value Ref Range Status   10/14/2020 >90 >60 mL/min/[1.73_m2] Final     Comment:      GFR Calc  Starting 12/18/2018, serum creatinine based estimated GFR (eGFR) will be   calculated using the Chronic Kidney Disease Epidemiology Collaboration   (CKD-EPI) equation.       Lab Results   Component Value Date    CR 0.74 10/14/2020     No results found for: MICROALBUMIN    Healthy Eating:  Healthy Eating Assessed Today: Yes  Cultural/Yazidi diet restrictions?: No  Meal planning/habits: None, Avoiding sweets, Low carb  How many times a week on average do you eat food made away from home (restaurant/take-out)?: 2  Meals include: Breakfast, Lunch, Dinner  Breakfast: 3 eggs with cheese with ham or turkey - water or diet mountain dew.  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 : Yes  Blood Glucose Meter: ContourNext  Times checking blood sugar at home (number): 2  Times checking blood sugar at home (per): Day  Blood glucose trend:  Increasing  [id number]  2 hours post [id number]    Taking Medications:  Diabetes Medication(s)     Biguanides       metFORMIN (GLUCOPHAGE-XR) 500 MG 24 hr tablet    TAKE 2 TABLETS BY MOUTH 2 TIMES A DAY WITH MEALS    Sulfonylureas       glimepiride (AMARYL) 2 MG tablet    Take two tablets ( 4 mg ) by mouth in the morning and one tablet (2 mg) in the evening with meals.    Incretin Mimetic Agents (GLP-1 Receptor Agonists)       liraglutide (VICTOZA) 18 MG/3ML solution    Inject 1.8 mg Subcutaneous daily        Taking Medication Assessed Today: Yes  Current Treatments: Oral Medication (taken by mouth), Non-insulin Injectables, Diet(Metformin ER 1000 mg bid, Glimepiride 4 mg am/2 mg evening, Victoza 1.8 mg daily)  Problems taking diabetes medications regularly?: No  Diabetes medication side effects?: No    Problem Solving:  Problem Solving Assessed Today: Yes  Is the patient at risk for hypoglycemia?: Yes  Hypoglycemia Frequency: Rarely  Hypoglycemia Treatment: Other food  Is the patient at risk for DKA?: No    Hypoglycemia symptoms  Feeling shaky: Yes    Reducing Risks:  Reducing Risks Assessed Today: No  Diabetes Risks: Family History, Sedentary Lifestyle  CAD Risks: Diabetes Mellitus, Male sex, Obesity, Sedentary lifestyle  Has dilated eye exam at least once a year?: Yes  Sees dentist every 6 months?: Yes  Feet checked by healthcare provider in the last year?: Yes    Healthy Coping:  Healthy Coping Assessed Today: Yes  Emotional response to diabetes: Acceptance, Concern for health and well-being  Stage of change: MAINTENANCE (Working to maintain change, with risk of relapse)  Patient Activation Measure Survey Score:  TED Score (Last Two) 10/11/2018   TED Raw Score 30   Activation Score 56   TED Level 3     Diabetes knowledge and skills assessment:   Patient is knowledgeable in diabetes management concepts related to: Healthy Eating, Being Active, Monitoring, Taking Medication, Problem Solving, Reducing  Risks and Healthy Coping    Patient needs further education on the following diabetes management concepts: Pt is doing well.     Based on learning assessment above, most appropriate setting for further diabetes education would be: Individual setting.      INTERVENTIONS:    Education provided today on:  AADE Self-Care Behaviors:  Healthy Eating: consistency in amount, composition, and timing of food intake, weight reduction.  Discussed alternate options for lunch meals.   Being Active: relationship to blood glucose.  Encouraged exercise as time will allow.   Healthy Coping: benefits of making appropriate lifestyle changes and methods for coping with stress    Opportunities for ongoing education and support in diabetes-self management were discussed.    Pt verbalized understanding of concepts discussed and recommendations provided today.       Education Materials Provided:  No new materials today.     ASSESSMENT:  Meter download notes glucose levels have trended up some but A1c still in target today was 6.2%.  Pt has had increased job stress and has been working long hours.  Meal times are more erratic, more fast food at lunch and some additional snacking r/t long work hours.  Discussed need for medication adjust if glucose levels continue to trend up.      Patient's most recent   Lab Results   Component Value Date    A1C 6.2 05/14/2021    is meeting goal of <7.0    PLAN  Continue efforts to make healthy, low carbohydrate food choices.   Exercise as able.  Keep testing glucose 2x/day - fasting and 2 hours post supper.  Pt will call if glucose levels trend up further.   See Patient Instructions for co-developed, patient-stated behavior change goals.  AVS printed and provided to patient today.   Follow up in three months.     Time Spent: 35 minutes  Encounter Type: Individual    Any diabetes medication dose changes were made via the CDE Protocol and Collaborative Practice Agreement with the patient's referring provider.  A copy of this encounter was shared with the provider.          Sincerely,        Harmony Bird RD

## 2021-05-14 NOTE — NURSING NOTE
"Chief Complaint   Patient presents with     Diabetes     Hypertension       Initial BP (!) 140/78 (BP Location: Left arm, Patient Position: Chair, Cuff Size: Adult Large)   Pulse 68   Temp 97.6  F (36.4  C) (Tympanic)   Resp 18   Wt 138 kg (304 lb 4 oz)   SpO2 96%   BMI 41.55 kg/m   Estimated body mass index is 41.55 kg/m  as calculated from the following:    Height as of 1/14/21: 1.822 m (5' 11.75\").    Weight as of this encounter: 138 kg (304 lb 4 oz).  Medication Reconciliation: complete  Emmanuelle Del Castillo LPN  "

## 2021-05-14 NOTE — PROGRESS NOTES
"Diabetes Self-Management Education & Support    Presents for: Individual review    SUBJECTIVE/OBJECTIVE:  Presents for: Individual review  Accompanied by: Self  Diabetes education in the past 24mo: Yes  Focus of Visit: Assistance w/ making life changes, Monitoring  Diabetes type: Type 2  Date of diagnosis: 2018  Disease course: Getting harder to manage  How confident are you filling out medical forms by yourself:: Extremely  Diabetes management related comments/concerns: Glucose levels are increasing some.  Transportation concerns: No  Difficulty affording diabetes medication?: No  Difficulty affording diabetes testing supplies?: No  Other concerns:: None  Cultural Influences/Ethnic Background:  American    Diabetes Symptoms & Complications:  Fatigue: Yes  Neuropathy: Yes  Polydipsia: No  Polyphagia: No  Polyuria: No  Visual change: No  Slow healing wounds: No  Symptom course: Stable  Weight trend: Decreasing(Down approximately 22# since October 2020.)  Complications assessed today?: No  CVA: No  Heart disease: No  Peripheral neuropathy: No    Patient Problem List and Family Medical History reviewed for relevant medical history, current medical status, and diabetes risk factors.    Vitals:  /78   Pulse 68   Resp 18   Ht 1.822 m (5' 11.75\")   Wt 136.6 kg (301 lb 4 oz)   BMI 41.14 kg/m    Estimated body mass index is 41.14 kg/m  as calculated from the following:    Height as of this encounter: 1.822 m (5' 11.75\").    Weight as of this encounter: 136.6 kg (301 lb 4 oz).   Last 3 BP:   BP Readings from Last 3 Encounters:   05/14/21 (!) 140/78   05/14/21 140/78   01/14/21 134/83       History   Smoking Status     Never Smoker   Smokeless Tobacco     Former User     Types: Snuff     Quit date: 10/1/2015       Labs:  Lab Results   Component Value Date    A1C 6.2 05/14/2021     Lab Results   Component Value Date     10/14/2020     Lab Results   Component Value Date    LDL 58 07/13/2020     HDL Cholesterol "   Date Value Ref Range Status   07/13/2020 35 (L) >39 mg/dL Final   ]  GFR Estimate   Date Value Ref Range Status   10/14/2020 >90 >60 mL/min/[1.73_m2] Final     Comment:     Non  GFR Calc  Starting 12/18/2018, serum creatinine based estimated GFR (eGFR) will be   calculated using the Chronic Kidney Disease Epidemiology Collaboration   (CKD-EPI) equation.       GFR Estimate If Black   Date Value Ref Range Status   10/14/2020 >90 >60 mL/min/[1.73_m2] Final     Comment:      GFR Calc  Starting 12/18/2018, serum creatinine based estimated GFR (eGFR) will be   calculated using the Chronic Kidney Disease Epidemiology Collaboration   (CKD-EPI) equation.       Lab Results   Component Value Date    CR 0.74 10/14/2020     No results found for: MICROALBUMIN    Healthy Eating:  Healthy Eating Assessed Today: Yes  Cultural/[employer] diet restrictions?: No  Meal planning/habits: None, Avoiding sweets, Low carb  How many times a week on average do you eat food made away from home (restaurant/take-out)?: 2  Meals include: Breakfast, Lunch, Dinner  Breakfast: 3 eggs with cheese with ham or turkey - water or diet mountain dew.  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 : Yes  Blood Glucose Meter: ContourNext  Times checking blood sugar at home (number): 2  Times checking blood sugar at home (per): Day  Blood glucose trend: Increasing  [id number]  2 hours post [id number]    Taking Medications:  Diabetes  Medication(s)     Biguanides       metFORMIN (GLUCOPHAGE-XR) 500 MG 24 hr tablet    TAKE 2 TABLETS BY MOUTH 2 TIMES A DAY WITH MEALS    Sulfonylureas       glimepiride (AMARYL) 2 MG tablet    Take two tablets ( 4 mg ) by mouth in the morning and one tablet (2 mg) in the evening with meals.    Incretin Mimetic Agents (GLP-1 Receptor Agonists)       liraglutide (VICTOZA) 18 MG/3ML solution    Inject 1.8 mg Subcutaneous daily        Taking Medication Assessed Today: Yes  Current Treatments: Oral Medication (taken by mouth), Non-insulin Injectables, Diet(Metformin ER 1000 mg bid, Glimepiride 4 mg am/2 mg evening, Victoza 1.8 mg daily)  Problems taking diabetes medications regularly?: No  Diabetes medication side effects?: No    Problem Solving:  Problem Solving Assessed Today: Yes  Is the patient at risk for hypoglycemia?: Yes  Hypoglycemia Frequency: Rarely  Hypoglycemia Treatment: Other food  Is the patient at risk for DKA?: No    Hypoglycemia symptoms  Feeling shaky: Yes    Reducing Risks:  Reducing Risks Assessed Today: No  Diabetes Risks: Family History, Sedentary Lifestyle  CAD Risks: Diabetes Mellitus, Male sex, Obesity, Sedentary lifestyle  Has dilated eye exam at least once a year?: Yes  Sees dentist every 6 months?: Yes  Feet checked by healthcare provider in the last year?: Yes    Healthy Coping:  Healthy Coping Assessed Today: Yes  Emotional response to diabetes: Acceptance, Concern for health and well-being  Stage of change: MAINTENANCE (Working to maintain change, with risk of relapse)  Patient Activation Measure Survey Score:  TED Score (Last Two) 10/11/2018   TED Raw Score 30   Activation Score 56   TED Level 3     Diabetes knowledge and skills assessment:   Patient is knowledgeable in diabetes management concepts related to: Healthy Eating, Being Active, Monitoring, Taking Medication, Problem Solving, Reducing Risks and Healthy Coping    Patient needs further education on the following diabetes  management concepts: Pt is doing well.     Based on learning assessment above, most appropriate setting for further diabetes education would be: Individual setting.      INTERVENTIONS:    Education provided today on:  AADE Self-Care Behaviors:  Healthy Eating: consistency in amount, composition, and timing of food intake, weight reduction.  Discussed alternate options for lunch meals.   Being Active: relationship to blood glucose.  Encouraged exercise as time will allow.   Healthy Coping: benefits of making appropriate lifestyle changes and methods for coping with stress    Opportunities for ongoing education and support in diabetes-self management were discussed.    Pt verbalized understanding of concepts discussed and recommendations provided today.       Education Materials Provided:  No new materials today.     ASSESSMENT:  Meter download notes glucose levels have trended up some but A1c still in target today was 6.2%.  Pt has had increased job stress and has been working long hours.  Meal times are more erratic, more fast food at lunch and some additional snacking r/t long work hours.  Discussed need for medication adjust if glucose levels continue to trend up.      Patient's most recent   Lab Results   Component Value Date    A1C 6.2 05/14/2021    is meeting goal of <7.0    PLAN  Continue efforts to make healthy, low carbohydrate food choices.   Exercise as able.  Keep testing glucose 2x/day - fasting and 2 hours post supper.  Pt will call if glucose levels trend up further.   See Patient Instructions for co-developed, patient-stated behavior change goals.  AVS printed and provided to patient today.   Follow up in three months.     Time Spent: 35 minutes  Encounter Type: Individual    Any diabetes medication dose changes were made via the CDE Protocol and Collaborative Practice Agreement with the patient's referring provider. A copy of this encounter was shared with the provider.

## 2021-05-28 DIAGNOSIS — E11.9 TYPE 2 DIABETES MELLITUS WITHOUT COMPLICATION, WITHOUT LONG-TERM CURRENT USE OF INSULIN (H): ICD-10-CM

## 2021-05-28 RX ORDER — LIRAGLUTIDE 6 MG/ML
INJECTION SUBCUTANEOUS
Qty: 9 ML | Refills: 0 | Status: SHIPPED | OUTPATIENT
Start: 2021-05-28 | End: 2021-07-26

## 2021-07-26 DIAGNOSIS — E11.9 TYPE 2 DIABETES MELLITUS WITHOUT COMPLICATION, WITHOUT LONG-TERM CURRENT USE OF INSULIN (H): ICD-10-CM

## 2021-07-26 RX ORDER — LIRAGLUTIDE 6 MG/ML
INJECTION SUBCUTANEOUS
Qty: 9 ML | Refills: 1 | Status: SHIPPED | OUTPATIENT
Start: 2021-07-26 | End: 2021-09-27 | Stop reason: ALTCHOICE

## 2021-08-11 NOTE — PATIENT INSTRUCTIONS
Encourage daily use of lotion on your feet.      Preventive Health Recommendations  Male Ages 40 to 49    Yearly exam:             See your health care provider every year in order to  o   Review health changes.   o   Discuss preventive care.    o   Review your medicines if your doctor has prescribed any.    You should be tested each year for STDs (sexually transmitted diseases) if you re at risk.     Have a cholesterol test every 5 years.     Have a colonoscopy (test for colon cancer) if someone in your family has had colon cancer or polyps before age 50.     After age 45, have a diabetes test (fasting glucose). If you are at risk for diabetes, you should have this test every 3 years.      Talk with your health care provider about whether or not a prostate cancer screening test (PSA) is right for you.    Shots: Get a flu shot each year. Get a tetanus shot every 10 years.     Nutrition:    Eat at least 5 servings of fruits and vegetables daily.     Eat whole-grain bread, whole-wheat pasta and brown rice instead of white grains and rice.     Get adequate Calcium and Vitamin D.     Lifestyle    Exercise for at least 150 minutes a week (30 minutes a day, 5 days a week). This will help you control your weight and prevent disease.     Limit alcohol to one drink per day.     No smoking.     Wear sunscreen to prevent skin cancer.     See your dentist every six months for an exam and cleaning.

## 2021-08-11 NOTE — PROGRESS NOTES
SUBJECTIVE:   CC: Bucky Franco is an 46 year old male who presents for preventative health visit.     Patient has been advised of split billing requirements and indicates understanding: Yes  HPI      Diabetes Follow-up    How often are you checking your blood sugar? Two times daily  Blood sugar testing frequency justification:  Uncontrolled diabetes  What time of day are you checking your blood sugars (select all that apply)?  Before and after meals  Have you had any blood sugars above 200?  Yes Not often  Have you had any blood sugars below 70?  No    What symptoms do you notice when your blood sugar is low?  Shaky, Dizzy, Weak and Lethargy    What concerns do you have today about your diabetes? None and Other: Numbers are creeping up and nothing has really changed in eating habits     Do you have any of these symptoms? (Select all that apply)  No numbness or tingling in feet.  No redness, sores or blisters on feet.  No complaints of excessive thirst.  No reports of blurry vision.  No significant changes to weight.    - Last A1c (5/14/2021): 6.2  - BG: creeping up over the last month or so. Morning 150s and few over 200s  - Diet: does not always watch his diet  - Exercise: limited d/t working a lot of hours  - glimepiride 4mg morning, 2mg evening, for the last (4) days increased to 4mg bid   - metformin 1000mg bid  - victoza 1.8mg every day   - on ASA, ACE (lisinopril), statin (crestor)  - last eye exam (12/2/2020): negative  - next appointment with diabetic education on 8/16/2021    Hyperlipidemia Follow-Up      Are you regularly taking any medication or supplement to lower your cholesterol?   Yes- Crestor 10 mg    Are you having muscle aches or other side effects that you think could be caused by your cholesterol lowering medication?  No  LDL (7/13/2020): 58  - rosuvastatin 10mg     Hypertension Follow-up      Do you check your blood pressure regularly outside of the clinic? Yes     Are you following a low  salt diet? Yes    Are your blood pressures ever more than 140 on the top number (systolic) OR more   than 90 on the bottom number (diastolic), for example 140/90? Yes    - metoprolol succinate 12.5mg (takes in the morning)  - diltiazem 240 + 120 (takes at night)  - lisinopril 40mg (takes in the morning)  - does not like coming to the clinic  - home BP: 130s/80s    BP Readings from Last 2 Encounters:   08/13/21 (!) 142/80   05/14/21 140/78     Hemoglobin A1C (%)   Date Value   05/14/2021 6.2 (H)   01/14/2021 5.8 (H)     LDL Cholesterol Calculated (mg/dL)   Date Value   07/13/2020 58   01/10/2019 50       Today's PHQ-2 Score:   PHQ-2 ( 1999 [employer]) 5/14/2021   Q1: Little interest or pleasure in doing things 0   Q2: Feeling down, depressed or hopeless 0   PHQ-2 Score 0       Abuse: Current or Past(Physical, Sexual or Emotional)- No  Do you feel safe in your environment? Yes    Have you ever done Advance Care Planning? (For example, a Health Directive, POLST, or a discussion with a medical provider or your loved ones about your wishes): No, advance care planning information given to patient to review.  Patient declined advance care planning discussion at this time.    Social History     Tobacco Use     Smoking status: Never Smoker     Smokeless tobacco: Former User     Types: Snuff   Substance Use Topics     Alcohol use: Yes     Comment: Rarely       Rare use of alcohol    Last PSA: No results found for: PSA    Reviewed orders with patient. Reviewed health maintenance and updated orders accordingly - Yes      # Wellness:        1. foot deformity   No  2. Current or previous foot ulceration     No  3. Current or previous pre-ulcerative calluses     No  4. previous partial amputation of one or both feet or complete amputation of one foot     No  5. peripheral neuropathy with evidence of callus formation     Yes  6. poor circulation     No          Reviewed and updated as needed this visit by clinical staff  Tobacco   Allergies  Meds  Problems  Med Hx  Surg Hx  Fam Hx  Soc Hx          Reviewed and updated as needed this visit by Provider  Tobacco  Allergies  Meds  Problems  Med Hx  Surg Hx  Fam Hx             Review of Systems   Constitutional: Negative for chills and fever.   HENT: Negative for congestion.    Respiratory: Negative for shortness of breath and wheezing.    Cardiovascular: Negative for chest pain and palpitations.   Gastrointestinal: Positive for heartburn (rare). Negative for abdominal pain.   Genitourinary: Negative for difficulty urinating.   Psychiatric/Behavioral: Negative for mood changes.       OBJECTIVE:   BP (!) 142/80 (BP Location: Left arm, Patient Position: Chair, Cuff Size: Adult Large)   Pulse 66   Temp 98.1  F (36.7  C) (Tympanic)   Resp 18   Wt 140.5 kg (309 lb 12.8 oz)   SpO2 97%   BMI 42.31 kg/m      Physical Exam  Constitutional:       General: He is not in acute distress.     Appearance: He is well-developed.   HENT:      Head: Normocephalic and atraumatic.      Right Ear: Tympanic membrane normal.      Left Ear: Tympanic membrane normal.      Mouth/Throat:      Mouth: Mucous membranes are moist.      Pharynx: No oropharyngeal exudate.   Eyes:      Extraocular Movements: Extraocular movements intact.      Conjunctiva/sclera: Conjunctivae normal.   Neck:      Thyroid: No thyromegaly.   Cardiovascular:      Rate and Rhythm: Normal rate and regular rhythm.      Pulses: Normal pulses.           Dorsalis pedis pulses are 2+ on the right side and 2+ on the left side.      Heart sounds: No murmur heard.     Pulmonary:      Effort: Pulmonary effort is normal. No respiratory distress.      Breath sounds: No wheezing or rales.   Abdominal:      General: Bowel sounds are normal. There is no distension.      Palpations: Abdomen is soft.      Tenderness: There is no abdominal tenderness. There is no guarding.   Musculoskeletal:         General: Normal range of motion.      Cervical back:  Normal range of motion and neck supple.      Right lower leg: No edema.      Left lower leg: No edema.   Feet:      Right foot:      Protective Sensation: 9 sites tested. 5 sites sensed.      Skin integrity: Dry skin present.      Toenail Condition: Fungal disease present.     Left foot:      Protective Sensation: 9 sites tested. 6 sites sensed.      Skin integrity: Dry skin present.      Toenail Condition: Left toenails are normal.   Lymphadenopathy:      Cervical: No cervical adenopathy.   Skin:     General: Skin is warm and dry.   Neurological:      Mental Status: He is alert.   Psychiatric:         Mood and Affect: Mood normal.         Diagnostic Test Results:  Results for orders placed or performed in visit on 08/13/21 (from the past 24 hour(s))   Hemoglobin A1c   Result Value Ref Range    Estimated Average Glucose 134 mg/dL    Hemoglobin A1C 6.3 (H) 0.0 - 5.6 %   Basic metabolic panel   Result Value Ref Range    Sodium 138 133 - 144 mmol/L    Potassium 3.6 3.4 - 5.3 mmol/L    Chloride 104 94 - 109 mmol/L    Carbon Dioxide (CO2) 29 20 - 32 mmol/L    Anion Gap 5 3 - 14 mmol/L    Urea Nitrogen 11 7 - 30 mg/dL    Creatinine 0.70 0.66 - 1.25 mg/dL    Calcium 8.7 8.5 - 10.1 mg/dL    Glucose 136 (H) 70 - 99 mg/dL    GFR Estimate >90 >60 mL/min/1.73m2   Lipid Profile (Chol, Trig, HDL, LDL calc)   Result Value Ref Range    Cholesterol 121 <200 mg/dL    Triglycerides 146 <150 mg/dL    Direct Measure HDL 37 (L) >=40 mg/dL    LDL Cholesterol Calculated 55 <=100 mg/dL    Non HDL Cholesterol 84 <130 mg/dL    Patient Fasting > 8hrs? Yes    Albumin Random Urine Quantitative with Creat Ratio   Result Value Ref Range    Creatinine Urine mg/dL 253 mg/dL    Albumin Urine mg/L 70 mg/L    Albumin Urine mg/g Cr 27.67 (H) 0.00 - 17.00 mg/g Cr   CBC with platelets and differential    Narrative    The following orders were created for panel order CBC with platelets and differential.  Procedure                               Abnormality          Status                     ---------                               -----------         ------                     CBC with platelets and d...[[id number]]  Abnormal            Final result                 Please view results for these tests on the individual orders.   CBC with platelets and differential   Result Value Ref Range    WBC Count 12.0 (H) 4.0 - 11.0 10e3/uL    RBC Count 5.12 4.40 - 5.90 10e6/uL    Hemoglobin 14.8 13.3 - 17.7 g/dL    Hematocrit 43.6 40.0 - 53.0 %    MCV 85 78 - 100 fL    MCH 28.9 26.5 - 33.0 pg    MCHC 33.9 31.5 - 36.5 g/dL    RDW 13.2 10.0 - 15.0 %    Platelet Count 326 150 - 450 10e3/uL    % Neutrophils 68 %    % Lymphocytes 20 %    % Monocytes 8 %    % Eosinophils 3 %    % Basophils 1 %    % Immature Granulocytes 0 %    NRBCs per 100 WBC 0 <1 /100    Absolute Neutrophils 8.1 1.6 - 8.3 10e3/uL    Absolute Lymphocytes 2.4 0.8 - 5.3 10e3/uL    Absolute Monocytes 1.0 0.0 - 1.3 10e3/uL    Absolute Eosinophils 0.4 0.0 - 0.7 10e3/uL    Absolute Basophils 0.1 0.0 - 0.2 10e3/uL    Absolute Immature Granulocytes 0.1 (H) <=0.0 10e3/uL    Absolute NRBCs 0.0 10e3/uL         ASSESSMENT/PLAN:   1. Routine general medical examination at a health care facility  No concerning lab or exam findings    2. Mixed hyperlipidemia  LDL (8/13/2021): 55  - Lipid Profile (Chol, Trig, HDL, LDL calc)  - c/w crestor 10mg    3. Type 2 diabetes mellitus without complication, without long-term current use of insulin (H)  A1c (8/13/2021): 6.3, slowly creeping up  - Hemoglobin A1c  - Basic metabolic panel  - Albumin Random Urine Quantitative with Creat Ratio  - CBC with platelets and differential  - [facility] FOOT EXAM  NO CHARGE  - c/w glimepiride 4mg morning, 2mg evening, consideration for increase to 4mg bid, but A1c < 6.5 if daily BG continue to increase  - c/w metformin 1000mg bid  - c/w victoza 1.8mg every day   - on ASA, ACE (lisinopril), statin (crestor)    4. Benign essential hypertension  BP slightly high, but at  "goal at home  - c/w metoprolol succinate 12.5mg (takes in the morning)  - c/w diltiazem 240 + 120 (takes at night)  - c/w lisinopril 40mg (takes in the morning)      5. Encounter for hepatitis C screening test for low risk patient  - Hepatitis C Screen Reflex to HCV RNA Quant and Genotype    6. Leukocytosis  - peripheral smear     Patient has been advised of split billing requirements and indicates understanding: Yes  COUNSELING:   Reviewed preventive health counseling, as reflected in patient instructions    Estimated body mass index is 42.31 kg/m  as calculated from the following:    Height as of 5/14/21: 1.822 m (5' 11.75\").    Weight as of this encounter: 140.5 kg (309 lb 12.8 oz).     Weight management plan: Discussed healthy diet and exercise guidelines    He reports that he has never smoked. He quit smokeless tobacco use about 5 years ago.  His smokeless tobacco use included snuff.      Counseling Resources:  ATP IV Guidelines  Pooled Cohorts Equation Calculator  FRAX Risk Assessment  ICSI Preventive Guidelines  Dietary Guidelines for Americans, 2010  USDA's MyPlate  ASA Prophylaxis  Lung CA Screening    Naomi De Santiago MD  [facility] - [place]  "

## 2021-08-13 ENCOUNTER — OFFICE VISIT (OUTPATIENT)
Dept: FAMILY MEDICINE | Facility: OTHER | Age: 46
End: 2021-08-13
Attending: FAMILY MEDICINE
Payer: COMMERCIAL

## 2021-08-13 VITALS
HEART RATE: 66 BPM | SYSTOLIC BLOOD PRESSURE: 142 MMHG | WEIGHT: 309.8 LBS | OXYGEN SATURATION: 97 % | DIASTOLIC BLOOD PRESSURE: 80 MMHG | TEMPERATURE: 98.1 F | BODY MASS INDEX: 42.31 KG/M2 | RESPIRATION RATE: 18 BRPM

## 2021-08-13 DIAGNOSIS — I10 BENIGN ESSENTIAL HYPERTENSION: ICD-10-CM

## 2021-08-13 DIAGNOSIS — E78.2 MIXED HYPERLIPIDEMIA: ICD-10-CM

## 2021-08-13 DIAGNOSIS — Z00.00 ROUTINE GENERAL MEDICAL EXAMINATION AT A HEALTH CARE FACILITY: Primary | ICD-10-CM

## 2021-08-13 DIAGNOSIS — E11.9 TYPE 2 DIABETES MELLITUS WITHOUT COMPLICATION, WITHOUT LONG-TERM CURRENT USE OF INSULIN (H): ICD-10-CM

## 2021-08-13 DIAGNOSIS — Z11.59 ENCOUNTER FOR HEPATITIS C SCREENING TEST FOR LOW RISK PATIENT: ICD-10-CM

## 2021-08-13 DIAGNOSIS — D72.829 LEUKOCYTOSIS, UNSPECIFIED TYPE: ICD-10-CM

## 2021-08-13 LAB
ANION GAP SERPL CALCULATED.3IONS-SCNC: 5 MMOL/L (ref 3–14)
BASOPHILS # BLD AUTO: 0.1 10E3/UL (ref 0–0.2)
BASOPHILS NFR BLD AUTO: 1 %
BUN SERPL-MCNC: 11 MG/DL (ref 7–30)
CALCIUM SERPL-MCNC: 8.7 MG/DL (ref 8.5–10.1)
CHLORIDE BLD-SCNC: 104 MMOL/L (ref 94–109)
CHOLEST SERPL-MCNC: 121 MG/DL
CO2 SERPL-SCNC: 29 MMOL/L (ref 20–32)
CREAT SERPL-MCNC: 0.7 MG/DL (ref 0.66–1.25)
CREAT UR-MCNC: 253 MG/DL
EOSINOPHIL # BLD AUTO: 0.4 10E3/UL (ref 0–0.7)
EOSINOPHIL NFR BLD AUTO: 3 %
ERYTHROCYTE [DISTWIDTH] IN BLOOD BY AUTOMATED COUNT: 13.2 % (ref 10–15)
EST. AVERAGE GLUCOSE BLD GHB EST-MCNC: 134 MG/DL
FASTING STATUS PATIENT QL REPORTED: YES
GFR SERPL CREATININE-BSD FRML MDRD: >90 ML/MIN/1.73M2
GLUCOSE BLD-MCNC: 136 MG/DL (ref 70–99)
HBA1C MFR BLD: 6.3 % (ref 0–5.6)
HCT VFR BLD AUTO: 43.6 % (ref 40–53)
HDLC SERPL-MCNC: 37 MG/DL
HGB BLD-MCNC: 14.8 G/DL (ref 13.3–17.7)
IMM GRANULOCYTES # BLD: 0.1 10E3/UL
IMM GRANULOCYTES NFR BLD: 0 %
LDLC SERPL CALC-MCNC: 55 MG/DL
LYMPHOCYTES # BLD AUTO: 2.4 10E3/UL (ref 0.8–5.3)
LYMPHOCYTES NFR BLD AUTO: 20 %
MCH RBC QN AUTO: 28.9 PG (ref 26.5–33)
MCHC RBC AUTO-ENTMCNC: 33.9 G/DL (ref 31.5–36.5)
MCV RBC AUTO: 85 FL (ref 78–100)
MICROALBUMIN UR-MCNC: 70 MG/L
MICROALBUMIN/CREAT UR: 27.67 MG/G CR (ref 0–17)
MONOCYTES # BLD AUTO: 1 10E3/UL (ref 0–1.3)
MONOCYTES NFR BLD AUTO: 8 %
NEUTROPHILS # BLD AUTO: 8.1 10E3/UL (ref 1.6–8.3)
NEUTROPHILS NFR BLD AUTO: 68 %
NONHDLC SERPL-MCNC: 84 MG/DL
NRBC # BLD AUTO: 0 10E3/UL
NRBC BLD AUTO-RTO: 0 /100
PLATELET # BLD AUTO: 326 10E3/UL (ref 150–450)
POTASSIUM BLD-SCNC: 3.6 MMOL/L (ref 3.4–5.3)
RBC # BLD AUTO: 5.12 10E6/UL (ref 4.4–5.9)
SODIUM SERPL-SCNC: 138 MMOL/L (ref 133–144)
TRIGL SERPL-MCNC: 146 MG/DL
WBC # BLD AUTO: 12 10E3/UL (ref 4–11)

## 2021-08-13 PROCEDURE — 86803 HEPATITIS C AB TEST: CPT | Performed by: FAMILY MEDICINE

## 2021-08-13 PROCEDURE — 36415 COLL VENOUS BLD VENIPUNCTURE: CPT | Performed by: FAMILY MEDICINE

## 2021-08-13 PROCEDURE — 99396 PREV VISIT EST AGE 40-64: CPT | Performed by: FAMILY MEDICINE

## 2021-08-13 PROCEDURE — 85025 COMPLETE CBC W/AUTO DIFF WBC: CPT | Performed by: FAMILY MEDICINE

## 2021-08-13 PROCEDURE — 80048 BASIC METABOLIC PNL TOTAL CA: CPT | Performed by: FAMILY MEDICINE

## 2021-08-13 PROCEDURE — 82043 UR ALBUMIN QUANTITATIVE: CPT | Performed by: FAMILY MEDICINE

## 2021-08-13 PROCEDURE — 80061 LIPID PANEL: CPT | Performed by: FAMILY MEDICINE

## 2021-08-13 PROCEDURE — 83036 HEMOGLOBIN GLYCOSYLATED A1C: CPT | Performed by: FAMILY MEDICINE

## 2021-08-13 ASSESSMENT — ENCOUNTER SYMPTOMS
PALPITATIONS: 0
CHILLS: 0
FEVER: 0
WHEEZING: 0
HEARTBURN: 1
SHORTNESS OF BREATH: 0
DIFFICULTY URINATING: 0
ABDOMINAL PAIN: 0

## 2021-08-13 ASSESSMENT — PAIN SCALES - GENERAL: PAINLEVEL: NO PAIN (0)

## 2021-08-13 NOTE — NURSING NOTE
"Chief Complaint   Patient presents with     Diabetes       Initial BP (!) 142/80 (BP Location: Left arm, Patient Position: Chair, Cuff Size: Adult Large)   Pulse 66   Temp 98.1  F (36.7  C) (Tympanic)   Resp 18   Wt 140.5 kg (309 lb 12.8 oz)   SpO2 97%   BMI 42.31 kg/m   Estimated body mass index is 42.31 kg/m  as calculated from the following:    Height as of 5/14/21: 1.822 m (5' 11.75\").    Weight as of this encounter: 140.5 kg (309 lb 12.8 oz).  Medication Reconciliation: complete  Emmanuelle Del Castillo LPN  "

## 2021-08-16 ENCOUNTER — HOSPITAL ENCOUNTER (OUTPATIENT)
Dept: EDUCATION SERVICES | Facility: HOSPITAL | Age: 46
Discharge: HOME | End: 2021-08-16
Attending: NURSE PRACTITIONER | Admitting: NURSE PRACTITIONER
Payer: COMMERCIAL

## 2021-08-16 VITALS
BODY MASS INDEX: 41.11 KG/M2 | DIASTOLIC BLOOD PRESSURE: 72 MMHG | RESPIRATION RATE: 16 BRPM | OXYGEN SATURATION: 97 % | HEIGHT: 72 IN | SYSTOLIC BLOOD PRESSURE: 140 MMHG | WEIGHT: 303.5 LBS | HEART RATE: 75 BPM

## 2021-08-16 DIAGNOSIS — E11.9 TYPE 2 DIABETES MELLITUS WITHOUT COMPLICATION, WITHOUT LONG-TERM CURRENT USE OF INSULIN (H): Primary | ICD-10-CM

## 2021-08-16 LAB — HCV AB SERPL QL IA: NONREACTIVE

## 2021-08-16 PROCEDURE — G0108 DIAB MANAGE TRN  PER INDIV: HCPCS | Performed by: DIETITIAN, REGISTERED

## 2021-08-16 RX ORDER — GLIMEPIRIDE 2 MG/1
TABLET ORAL
Qty: 360 TABLET | Refills: 3 | Status: SHIPPED | OUTPATIENT
Start: 2021-08-16 | End: 2022-08-15

## 2021-08-16 RX ORDER — SEMAGLUTIDE 1.34 MG/ML
0.5 INJECTION, SOLUTION SUBCUTANEOUS WEEKLY
Qty: 1.5 ML | Refills: 3 | Status: SHIPPED | OUTPATIENT
Start: 2021-08-16 | End: 2021-09-27 | Stop reason: DRUGHIGH

## 2021-08-16 ASSESSMENT — PAIN SCALES - GENERAL: PAINLEVEL: MODERATE PAIN (5)

## 2021-08-16 ASSESSMENT — MIFFLIN-ST. JEOR: SCORE: 2300.7

## 2021-08-16 NOTE — LETTER
"    8/16/2021        RE: Bucky Franco  [address]  [place] [address]        Diabetes Self-Management Education & Support    Presents for: Individual review    SUBJECTIVE/OBJECTIVE:  Presents for: Individual review  Accompanied by: Self  Diabetes education in the past 24mo: Yes  Focus of Visit: Assistance w/ making life changes, Monitoring  Diabetes type: Type 2  Date of diagnosis: 2018  Disease course: Getting harder to manage  How confident are you filling out medical forms by yourself:: Extremely  Diabetes management related comments/concerns: Glucose levels are increasing some.  Transportation concerns: No  Difficulty affording diabetes medication?: No  Difficulty affording diabetes testing supplies?: No  Other concerns:: None  Cultural Influences/Ethnic Background:  Other    Diabetes Symptoms & Complications:  Fatigue: Yes  Neuropathy: Yes  Polydipsia: No  Polyphagia: No  Polyuria: No  Visual change: No  Slow healing wounds: No  Symptom course: Stable  Weight trend: Stable (Down approximately 22# since October 2020.)  Complications assessed today?: No  CVA: No  Heart disease: No  Peripheral neuropathy: No    Patient Problem List and Family Medical History reviewed for relevant medical history, current medical status, and diabetes risk factors.    Vitals:  /72 (BP Location: Left arm, Patient Position: Chair, Cuff Size: Adult Regular)   Pulse 75   Resp 16   Ht 1.822 m (5' 11.75\")   Wt 137.7 kg (303 lb 8 oz)   SpO2 97%   BMI 41.45 kg/m    Estimated body mass index is 41.45 kg/m  as calculated from the following:    Height as of this encounter: 1.822 m (5' 11.75\").    Weight as of this encounter: 137.7 kg (303 lb 8 oz).   Last 3 BP:   BP Readings from Last 3 Encounters:   08/16/21 140/72   08/13/21 (!) 142/80   05/14/21 140/78       History   Smoking Status     Never Smoker   Smokeless Tobacco     Former User     Types: Snuff     Quit date: 10/1/2015       Labs:  Lab Results   Component Value " Date    A1C 6.3 08/13/2021    A1C 6.2 05/14/2021     Lab Results   Component Value Date     08/13/2021     10/14/2020     Lab Results   Component Value Date    LDL 55 08/13/2021    LDL 58 07/13/2020     HDL Cholesterol   Date Value Ref Range Status   07/13/2020 35 (L) >39 mg/dL Final     Direct Measure HDL   Date Value Ref Range Status   08/13/2021 37 (L) >=40 mg/dL Final     Comment:     0-19 years:       Greater than or equal to 45 mg/dL   Low: Less than 40 mg/dL   Borderline low: 40-44 mg/dL     20 years and older:   Female: Greater than or equal to 50 mg/dL   Male:   Greater than or equal to 40 mg/dL        ]  GFR Estimate   Date Value Ref Range Status   08/13/2021 >90 >60 mL/min/1.73m2 Final     Comment:     As of July 11, 2021, eGFR is calculated by the CKD-EPI creatinine equation, without race adjustment. eGFR can be influenced by muscle mass, exercise, and diet. The reported eGFR is an estimation only and is only applicable if the renal function is stable.   10/14/2020 >90 >60 mL/min/[1.73_m2] Final     Comment:     Non  GFR Calc  Starting 12/18/2018, serum creatinine based estimated GFR (eGFR) will be   calculated using the Chronic Kidney Disease Epidemiology Collaboration   (CKD-EPI) equation.       GFR Estimate If Black   Date Value Ref Range Status   10/14/2020 >90 >60 mL/min/[1.73_m2] Final     Comment:      GFR Calc  Starting 12/18/2018, serum creatinine based estimated GFR (eGFR) will be   calculated using the Chronic Kidney Disease Epidemiology Collaboration   (CKD-EPI) equation.       Lab Results   Component Value Date    CR 0.70 08/13/2021    CR 0.74 10/14/2020     No results found for: MICROALBUMIN    Healthy Eating:  Healthy Eating Assessed Today: Yes  Cultural/Jewish diet restrictions?: No  Meal planning/habits: None, Avoiding sweets, Low carb, Other (Low sodium)  How many times a week on average do you eat food made away from home  (restaurant/take-out)?: 2  Meals include: Breakfast, Lunch, Dinner  Breakfast: 3-4 eggs with cheese with ham or turkey - somtimes veggies - water or diet mountain dew.  Lunch: salad with veggies, nuts, meat, cheese - raspberry vinegarette  or sandwich or fast food (usually no fries) - diet mountain dew  Dinner: meat with 1-2 slices toast or veggies  - diet mountain dew or water  Snacks: peanut butter granola bars - 2 each  Beverages: Water, Diet soda, Milk  Has patient met with a dietitian in the past?: Yes    Being Active:  Being Active Assessed Today: Yes  Exercise:: Currently not exercising  Barrier to exercise: None    Monitoring:  Monitoring Assessed Today: Yes  Did patient bring glucose meter to appointment? : Yes  Blood Glucose Meter: ContourNext  Times checking blood sugar at home (number): 2  Times checking blood sugar at home (per): Day  Blood glucose trend: Increasing  [id number]  2 hours post [id number]  Three week average 171.     Taking Medications:  Diabetes Medication(s)     Biguanides       metFORMIN (GLUCOPHAGE-XR) 500 MG 24 hr tablet    TAKE 2 TABLETS BY MOUTH 2 TIMES A DAY WITH MEALS    Sulfonylureas       glimepiride (AMARYL) 2 MG tablet    Take two tablets ( 4 mg ) by mouth in the morning and one tablet (2 mg) in the evening with meals.    Incretin Mimetic Agents (GLP-1 Receptor Agonists)       VICTOZA PEN 18 MG/3ML soln    INJECT 1.8MG SUBCUTANEOUS DAILY        Taking Medication Assessed Today: Yes  Current Treatments: Oral Medication (taken by mouth), Non-insulin Injectables, Diet (Metformin ER 1000 mg bid, Glimepiride 4 mg am/4 mg evening, Victoza 1.8 mg daily)  Problems taking diabetes medications regularly?: No  Diabetes medication side effects?: No    Problem Solving:  Problem Solving Assessed Today: Yes  Is the patient at risk for hypoglycemia?: Yes  Hypoglycemia Frequency: Rarely  Hypoglycemia Treatment: Other food  Is the patient at risk for DKA?: No    Hypoglycemia  symptoms  Feeling shaky: Yes     Reducing Risks:  Reducing Risks Assessed Today: No  Diabetes Risks: Family History, Sedentary Lifestyle  CAD Risks: Diabetes Mellitus, Male sex, Obesity, Sedentary lifestyle  Has dilated eye exam at least once a year?: Yes  Sees dentist every 6 months?: Yes  Feet checked by healthcare provider in the last year?: Yes    Healthy Coping:  Healthy Coping Assessed Today: Yes  Emotional response to diabetes: Acceptance, Concern for health and well-being  Stage of change: MAINTENANCE (Working to maintain change, with risk of relapse)  Patient Activation Measure Survey Score:  TED Score (Last Two) 10/11/2018   TED Raw Score 30   Activation Score 56   TED Level 3       Diabetes knowledge and skills assessment:   Patient is knowledgeable in diabetes management concepts related to: Healthy Eating, Being Active, Monitoring, Taking Medication, Problem Solving, Reducing Risks and Healthy Coping    Based on learning assessment above, most appropriate setting for further diabetes education would be: Individual setting.      INTERVENTIONS:    Education provided today on:  AADE Self-Care Behaviors:  Being Active: relationship to blood glucose  Taking Medication: Discussed trying Ozempic vs Victoza as would only be weekly injection vs daily and may offering improved glucose control/greater weight loss benefit.     Opportunities for ongoing education and support in diabetes-self management were discussed.    Pt verbalized understanding of concepts discussed and recommendations provided today.       Education Materials Provided:  Ozempic savings card    ASSESSMENT:  Glucose levels on meter download are most often above target but recent A1c in target at 6.3%.  Weight stable.  Ozempic may offer improved glucose lowering benefit/greater weight loss than Victoza along with weekly vs daily injection.  Pt is agreeable to trying.  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     Patient's most  recent   Lab Results   Component Value Date    A1C 6.3 08/13/2021    A1C 6.2 05/14/2021    is meeting goal of <7.0    PLAN  Continue efforts to follow healthy low carbohydrate diet.   Exercise as able.  Test glucose 2x/day - fasting and 2 hours after supper meal.  Keep taking current dose Metformin ER and Glimepiride (pt transitioned to 4 mg bid from 6 mg on his own).   Message sent to provider to try Ozempic vs Victoza - awaiting reply.   See Patient Instructions for co-developed, patient-stated behavior change goals.  AVS printed and provided to patient today.  Follow up in six weeks.     Time Spent: 40 minutes  Encounter Type: Individual    Any diabetes medication dose changes were made via the CDE Protocol and Collaborative Practice Agreement with the patient's referring provider. A copy of this encounter was shared with the provider.          Sincerely,        Harmony Bird RD

## 2021-08-16 NOTE — PROGRESS NOTES
"Diabetes Self-Management Education & Support    Presents for: Individual review    SUBJECTIVE/OBJECTIVE:  Presents for: Individual review  Accompanied by: Self  Diabetes education in the past 24mo: Yes  Focus of Visit: Assistance w/ making life changes, Monitoring  Diabetes type: Type 2  Date of diagnosis: 2018  Disease course: Getting harder to manage  How confident are you filling out medical forms by yourself:: Extremely  Diabetes management related comments/concerns: Glucose levels are increasing some.  Transportation concerns: No  Difficulty affording diabetes medication?: No  Difficulty affording diabetes testing supplies?: No  Other concerns:: None  Cultural Influences/Ethnic Background:  Other    Diabetes Symptoms & Complications:  Fatigue: Yes  Neuropathy: Yes  Polydipsia: No  Polyphagia: No  Polyuria: No  Visual change: No  Slow healing wounds: No  Symptom course: Stable  Weight trend: Stable (Down approximately 22# since October 2020.)  Complications assessed today?: No  CVA: No  Heart disease: No  Peripheral neuropathy: No    Patient Problem List and Family Medical History reviewed for relevant medical history, current medical status, and diabetes risk factors.    Vitals:  /72 (BP Location: Left arm, Patient Position: Chair, Cuff Size: Adult Regular)   Pulse 75   Resp 16   Ht 1.822 m (5' 11.75\")   Wt 137.7 kg (303 lb 8 oz)   SpO2 97%   BMI 41.45 kg/m    Estimated body mass index is 41.45 kg/m  as calculated from the following:    Height as of this encounter: 1.822 m (5' 11.75\").    Weight as of this encounter: 137.7 kg (303 lb 8 oz).   Last 3 BP:   BP Readings from Last 3 Encounters:   08/16/21 140/72   08/13/21 (!) 142/80   05/14/21 140/78       History   Smoking Status     Never Smoker   Smokeless Tobacco     Former User     Types: Snuff     Quit date: 10/1/2015       Labs:  Lab Results   Component Value Date    A1C 6.3 08/13/2021    A1C 6.2 05/14/2021     Lab Results   Component Value Date "     08/13/2021     10/14/2020     Lab Results   Component Value Date    LDL 55 08/13/2021    LDL 58 07/13/2020     HDL Cholesterol   Date Value Ref Range Status   07/13/2020 35 (L) >39 mg/dL Final     Direct Measure HDL   Date Value Ref Range Status   08/13/2021 37 (L) >=40 mg/dL Final     Comment:     0-19 years:       Greater than or equal to 45 mg/dL   Low: Less than 40 mg/dL   Borderline low: 40-44 mg/dL     20 years and older:   Female: Greater than or equal to 50 mg/dL   Male:   Greater than or equal to 40 mg/dL        ]  GFR Estimate   Date Value Ref Range Status   08/13/2021 >90 >60 mL/min/1.73m2 Final     Comment:     As of July 11, 2021, eGFR is calculated by the CKD-EPI creatinine equation, without race adjustment. eGFR can be influenced by muscle mass, exercise, and diet. The reported eGFR is an estimation only and is only applicable if the renal function is stable.   10/14/2020 >90 >60 mL/min/[1.73_m2] Final     Comment:     Non  GFR Calc  Starting 12/18/2018, serum creatinine based estimated GFR (eGFR) will be   calculated using the Chronic Kidney Disease Epidemiology Collaboration   (CKD-EPI) equation.       GFR Estimate If Black   Date Value Ref Range Status   10/14/2020 >90 >60 mL/min/[1.73_m2] Final     Comment:      GFR Calc  Starting 12/18/2018, serum creatinine based estimated GFR (eGFR) will be   calculated using the Chronic Kidney Disease Epidemiology Collaboration   (CKD-EPI) equation.       Lab Results   Component Value Date    CR 0.70 08/13/2021    CR 0.74 10/14/2020     No results found for: MICROALBUMIN    Healthy Eating:  Healthy Eating Assessed Today: Yes  Cultural/Church diet restrictions?: No  Meal planning/habits: None, Avoiding sweets, Low carb, Other (Low sodium)  How many times a week on average do you eat food made away from home (restaurant/take-out)?: 2  Meals include: Breakfast, Lunch, Dinner  Breakfast: 3-4 eggs with cheese  with ham or turkey - somtimes veggies - water or diet mountain dew.  Lunch: salad with veggies, nuts, meat, cheese - raspberry vinegarette  or sandwich or fast food (usually no fries) - diet mountain dew  Dinner: meat with 1-2 slices toast or veggies  - diet mountain dew or water  Snacks: peanut butter granola bars - 2 each  Beverages: Water, Diet soda, Milk  Has patient met with a dietitian in the past?: Yes    Being Active:  Being Active Assessed Today: Yes  Exercise:: Currently not exercising  Barrier to exercise: None    Monitoring:  Monitoring Assessed Today: Yes  Did patient bring glucose meter to appointment? : Yes  Blood Glucose Meter: ContourNext  Times checking blood sugar at home (number): 2  Times checking blood sugar at home (per): Day  Blood glucose trend: Increasing  [id number]  2 hours post [id number]  Three week average 171.     Taking Medications:  Diabetes Medication(s)     Biguanides       metFORMIN (GLUCOPHAGE-XR) 500 MG 24 hr tablet    TAKE 2 TABLETS BY MOUTH 2 TIMES A DAY WITH MEALS    Sulfonylureas       glimepiride (AMARYL) 2 MG tablet    Take two tablets ( 4 mg ) by mouth in the morning and one tablet (2 mg) in the evening with meals.    Incretin Mimetic Agents (GLP-1 Receptor Agonists)       VICTOZA PEN 18 MG/3ML soln    INJECT 1.8MG SUBCUTANEOUS DAILY        Taking Medication Assessed Today: Yes  Current Treatments: Oral Medication (taken by mouth), Non-insulin Injectables, Diet (Metformin ER 1000 mg bid, Glimepiride 4 mg am/4 mg evening, Victoza 1.8 mg daily)  Problems taking diabetes medications regularly?: No  Diabetes medication side effects?: No    Problem Solving:  Problem Solving Assessed Today: Yes  Is the patient at risk for hypoglycemia?: Yes  Hypoglycemia Frequency: Rarely  Hypoglycemia Treatment: Other food  Is the patient at risk for DKA?: No    Hypoglycemia symptoms  Feeling shaky: Yes     Reducing Risks:  Reducing Risks Assessed Today: No  Diabetes Risks: Family  History, Sedentary Lifestyle  CAD Risks: Diabetes Mellitus, Male sex, Obesity, Sedentary lifestyle  Has dilated eye exam at least once a year?: Yes  Sees dentist every 6 months?: Yes  Feet checked by healthcare provider in the last year?: Yes    Healthy Coping:  Healthy Coping Assessed Today: Yes  Emotional response to diabetes: Acceptance, Concern for health and well-being  Stage of change: MAINTENANCE (Working to maintain change, with risk of relapse)  Patient Activation Measure Survey Score:  TED Score (Last Two) 10/11/2018   TED Raw Score 30   Activation Score 56   TED Level 3       Diabetes knowledge and skills assessment:   Patient is knowledgeable in diabetes management concepts related to: Healthy Eating, Being Active, Monitoring, Taking Medication, Problem Solving, Reducing Risks and Healthy Coping    Based on learning assessment above, most appropriate setting for further diabetes education would be: Individual setting.      INTERVENTIONS:    Education provided today on:  AADE Self-Care Behaviors:  Being Active: relationship to blood glucose  Taking Medication: Discussed trying Ozempic vs Victoza as would only be weekly injection vs daily and may offering improved glucose control/greater weight loss benefit.     Opportunities for ongoing education and support in diabetes-self management were discussed.    Pt verbalized understanding of concepts discussed and recommendations provided today.       Education Materials Provided:  Ozempic savings card    ASSESSMENT:  Glucose levels on meter download are most often above target but recent A1c in target at 6.3%.  Weight stable.  Ozempic may offer improved glucose lowering benefit/greater weight loss than Victoza along with weekly vs daily injection.  Pt is agreeable to trying.  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     Patient's most recent   Lab Results   Component Value Date    A1C 6.3 08/13/2021    A1C 6.2 05/14/2021    is meeting goal  of <7.0    PLAN  Continue efforts to follow healthy low carbohydrate diet.   Exercise as able.  Test glucose 2x/day - fasting and 2 hours after supper meal.  Keep taking current dose Metformin ER and Glimepiride (pt transitioned to 4 mg bid from 6 mg on his own).   Message sent to provider to try Ozempic vs Victoza - awaiting reply.   See Patient Instructions for co-developed, patient-stated behavior change goals.  AVS printed and provided to patient today.  Follow up in six weeks.     Time Spent: 40 minutes  Encounter Type: Individual    Any diabetes medication dose changes were made via the CDE Protocol and Collaborative Practice Agreement with the patient's referring provider. A copy of this encounter was shared with the provider.

## 2021-08-16 NOTE — PATIENT INSTRUCTIONS
-Keep limiting foods high in carbohydrates.   -Exercise as able.  -Test glucose 2x/day - fasting and 2 hours after supper meal.  -Target levels are fasting , 2 hours after supper meal <180.  -Keep taking current dose of Metformin ER and Glimepiride.    -Once your Victoza is done transition to Ozempic. Your dose will be 0.50 mg weekly.    -Call with any side effects.  -Follow up in six weeks.    -MICHAEL Aviles, -[phone].

## 2021-08-16 NOTE — TELEPHONE ENCOUNTER
Pt was here today for diabetes visit.  Glucose levels on meter download are trending up:  Fasting 130-190, 2 hours post [id number].  Current diabetes medications:  Metformin ER 1000 mg bid, Glimepiride 4 mg bid, Victoza 1.8 mg daily.  Okay to try Ozempic vs Victoza.  May be more effective and offer > weight loss benefit.  If so, please sign pended order.

## 2021-09-19 ENCOUNTER — HEALTH MAINTENANCE LETTER (OUTPATIENT)
Age: 46
End: 2021-09-19

## 2021-09-27 ENCOUNTER — HOSPITAL ENCOUNTER (OUTPATIENT)
Dept: EDUCATION SERVICES | Facility: HOSPITAL | Age: 46
Discharge: HOME | End: 2021-09-27
Attending: DIETITIAN, REGISTERED | Admitting: FAMILY MEDICINE
Payer: COMMERCIAL

## 2021-09-27 VITALS
HEIGHT: 71 IN | OXYGEN SATURATION: 98 % | DIASTOLIC BLOOD PRESSURE: 76 MMHG | HEART RATE: 66 BPM | BODY MASS INDEX: 43.12 KG/M2 | WEIGHT: 308 LBS | SYSTOLIC BLOOD PRESSURE: 142 MMHG

## 2021-09-27 DIAGNOSIS — E11.9 TYPE 2 DIABETES MELLITUS WITHOUT COMPLICATION, WITHOUT LONG-TERM CURRENT USE OF INSULIN (H): Primary | ICD-10-CM

## 2021-09-27 PROCEDURE — 97803 MED NUTRITION INDIV SUBSEQ: CPT | Performed by: DIETITIAN, REGISTERED

## 2021-09-27 RX ORDER — SEMAGLUTIDE 1.34 MG/ML
1 INJECTION, SOLUTION SUBCUTANEOUS
Qty: 3 ML | Refills: 3 | Status: SHIPPED | OUTPATIENT
Start: 2021-09-27 | End: 2022-01-26

## 2021-09-27 ASSESSMENT — MIFFLIN-ST. JEOR: SCORE: 2305.24

## 2021-09-27 ASSESSMENT — PAIN SCALES - GENERAL: PAINLEVEL: NO PAIN (0)

## 2021-09-27 NOTE — LETTER
"    9/27/2021        RE: Bucky Franco  [address]   [place] [address]        Diabetes Self-Management Education & Support    Presents for: Individual review    SUBJECTIVE/OBJECTIVE:  Presents for: Individual review  Accompanied by: Self  Diabetes education in the past 24mo: Yes  Focus of Visit: Assistance w/ making life changes, Monitoring  Diabetes type: Type 2  Date of diagnosis: 2018  Disease course: Improving  How confident are you filling out medical forms by yourself:: Extremely  Diabetes management related comments/concerns: Glucose levels slow to decrease.  Transportation concerns: No  Difficulty affording diabetes medication?: No  Difficulty affording diabetes testing supplies?: No  Other concerns:: None  Cultural Influences/Ethnic Background:  Other    Diabetes Symptoms & Complications:  Fatigue: Yes  Neuropathy: Yes  Polydipsia: No  Polyphagia: No  Polyuria: No  Visual change: No  Slow healing wounds: No  Symptom course: Stable  Weight trend: Increasing (Weight is up 5# since last visit in August but still down 17# from October 2020.)  Complications assessed today?: No  CVA: No  Heart disease: No  Peripheral neuropathy: No    Patient Problem List and Family Medical History reviewed for relevant medical history, current medical status, and diabetes risk factors.    Vitals:  /76 (BP Location: Left arm, Cuff Size: Adult Large)   Pulse 66   Ht 1.797 m (5' 10.75\")   Wt 139.7 kg (308 lb)   SpO2 98%   BMI 43.26 kg/m    Estimated body mass index is 43.26 kg/m  as calculated from the following:    Height as of this encounter: 1.797 m (5' 10.75\").    Weight as of this encounter: 139.7 kg (308 lb).   Last 3 BP:   BP Readings from Last 3 Encounters:   09/27/21 142/76   08/16/21 140/72   08/13/21 (!) 142/80       History   Smoking Status     Never Smoker   Smokeless Tobacco     Former User     Types: Snuff     Quit date: 10/1/2015       Labs:  Lab Results   Component Value Date    A1C 6.3 08/13/2021 "    A1C 6.2 05/14/2021     Lab Results   Component Value Date     08/13/2021     10/14/2020     Lab Results   Component Value Date    LDL 55 08/13/2021    LDL 58 07/13/2020     HDL Cholesterol   Date Value Ref Range Status   07/13/2020 35 (L) >39 mg/dL Final     Direct Measure HDL   Date Value Ref Range Status   08/13/2021 37 (L) >=40 mg/dL Final     Comment:     0-19 years:       Greater than or equal to 45 mg/dL   Low: Less than 40 mg/dL   Borderline low: 40-44 mg/dL     20 years and older:   Female: Greater than or equal to 50 mg/dL   Male:   Greater than or equal to 40 mg/dL        ]  GFR Estimate   Date Value Ref Range Status   08/13/2021 >90 >60 mL/min/1.73m2 Final     Comment:     As of July 11, 2021, eGFR is calculated by the CKD-EPI creatinine equation, without race adjustment. eGFR can be influenced by muscle mass, exercise, and diet. The reported eGFR is an estimation only and is only applicable if the renal function is stable.   10/14/2020 >90 >60 mL/min/[1.73_m2] Final     Comment:     Non  GFR Calc  Starting 12/18/2018, serum creatinine based estimated GFR (eGFR) will be   calculated using the Chronic Kidney Disease Epidemiology Collaboration   (CKD-EPI) equation.       GFR Estimate If Black   Date Value Ref Range Status   10/14/2020 >90 >60 mL/min/[1.73_m2] Final     Comment:      GFR Calc  Starting 12/18/2018, serum creatinine based estimated GFR (eGFR) will be   calculated using the Chronic Kidney Disease Epidemiology Collaboration   (CKD-EPI) equation.       Lab Results   Component Value Date    CR 0.70 08/13/2021    CR 0.74 10/14/2020     No results found for: MICROALBUMIN    Healthy Eating:  Healthy Eating Assessed Today: Yes  Cultural/Taoist diet restrictions?: No  Meal planning/habits: None, Avoiding sweets, Low carb, Other (Low sodium)  How many times a week on average do you eat food made away from home (restaurant/take-out)?: 2  Meals include:  Breakfast, Lunch, Dinner  Breakfast: 3-4 eggs with cheese with ham or turkey - somtimes veggies - water or diet mountain dew.  Lunch: salad with veggies, nuts, meat, cheese - raspberry vinegarette  or sandwich or fast food (usually no fries) - diet mountain dew  Dinner: meat with 1-2 slices toast or veggies  - diet mountain dew or water  Snacks: peanut butter granola bars - 2 each  Beverages: Water, Diet soda, Milk  Has patient met with a dietitian in the past?: Yes    Being Active:  Being Active Assessed Today: Yes  Exercise:: Currently not exercising  Barrier to exercise: None    Monitoring:  Monitoring Assessed Today: Yes  Did patient bring glucose meter to appointment? : Yes  Blood Glucose Meter: ContourNext  Times checking blood sugar at home (number): 2  Times checking blood sugar at home (per): Day  Blood glucose trend: Decreasing  Fasting-157, 159, 182, 146, 135, 165, 168, 152, 161, 169, 161, 168, 165, 184  Before supper-90  2 hours post supper-173, 182, 191, 165, 181, 214  Two week average is 164.     Taking Medications:  Diabetes Medication(s)     Biguanides       metFORMIN (GLUCOPHAGE-XR) 500 MG 24 hr tablet    TAKE 2 TABLETS BY MOUTH 2 TIMES A DAY WITH MEALS    Sulfonylureas       glimepiride (AMARYL) 2 MG tablet    Take two tablets ( 4 mg ) by mouth in the morning and one tablet (4 mg) in the evening with meals.    Incretin Mimetic Agents (GLP-1 Receptor Agonists)       semaglutide (OZEMPIC, 1 MG/DOSE,) 2 MG/1.5ML pen    Inject 1 mg Subcutaneous every 7 days          Taking Medication Assessed Today: Yes  Current Treatments: Oral Medication (taken by mouth), Non-insulin Injectables, Diet (Metformin ER 1000 mg bid, Glimepiride 4 mg am/2 mg evening, Ozempic 0.50 mg weekly)  Problems taking diabetes medications regularly?: No  Diabetes medication side effects?: No    Problem Solving:  Problem Solving Assessed Today: Yes  Is the patient at risk for hypoglycemia?: Yes  Hypoglycemia Frequency:  Rarely  Hypoglycemia Treatment: Other food  Is the patient at risk for DKA?: No    Hypoglycemia symptoms  Feeling shaky: Yes    Reducing Risks:  Reducing Risks Assessed Today: No  Diabetes Risks: Family History, Sedentary Lifestyle  CAD Risks: Diabetes Mellitus, Male sex, Obesity, Sedentary lifestyle  Has dilated eye exam at least once a year?: Yes  Sees dentist every 6 months?: Yes  Feet checked by healthcare provider in the last year?: Yes    Healthy Coping:  Healthy Coping Assessed Today: Yes  Emotional response to diabetes: Acceptance, Concern for health and well-being  Stage of change: MAINTENANCE (Working to maintain change, with risk of relapse)  Patient Activation Measure Survey Score:  TED Score (Last Two) 10/11/2018   TED Raw Score 30   Activation Score 56   TED Level 3     Diabetes knowledge and skills assessment:   Patient is knowledgeable in diabetes management concepts related to: Healthy Eating, Being Active, Monitoring, Taking Medication, Problem Solving, Reducing Risks and Healthy Coping    Patient needs further education on the following diabetes management concepts: Taking Medication    Based on learning assessment above, most appropriate setting for further diabetes education would be: Individual setting.      INTERVENTIONS:    Education provided today on:  AADE Self-Care Behaviors:  Taking Medication: Dose adjustment Ozempic.    Opportunities for ongoing education and support in diabetes-self management were discussed.    Pt verbalized understanding of concepts discussed and recommendations provided today.       Education Materials Provided:  Ozempic Savings Card      ASSESSMENT:  Glucose levels with some improvement with change from Victoza to Ozempic as previous average was 171 and today's average was 163 but not yet to target.  No weight loss.      Patient's most recent   Lab Results   Component Value Date    A1C 6.3 08/13/2021    A1C 6.2 05/14/2021    is meeting goal of  <7.0    PLAN  Continue current efforts to eat a healthy, low carbohydrate diet.  Try to get some daily exercise.  Increase Ozempic to 1 mg weekly.   See Patient Instructions for co-developed, patient-stated behavior change goals.  AVS printed and provided to patient today.  Follow up Nov 15th for glucose review and A1c check.      Time Spent: 20 minutes  Encounter Type: Individual    Any diabetes medication dose changes were made via the CDE Protocol and Collaborative Practice Agreement with the patient's referring provider. A copy of this encounter was shared with the provider.          Sincerely,        Harmony Bird RD

## 2021-09-27 NOTE — PATIENT INSTRUCTIONS
-Keep trying to limit foods high in carbohydrates.    -Try to get some exercise most days of the week.  -Keep testing 2x/day - fasting and either before supper or 2 hours after supper.   -Target levels are fasting and before supper , 2 hours after supper < 180.  -Keep taking your same does of Metformin and Glimepiride.   -Increase Ozempic to 1 mg weekly.  -Follow up on November 15th at 8:30 am.  We will do your a1c in the office.  You do not have to be fasting.  -Call with any concerns - MICHAEL Aviles, [phone].

## 2021-09-27 NOTE — PROGRESS NOTES
"Diabetes Self-Management Education & Support    Presents for: Individual review    SUBJECTIVE/OBJECTIVE:  Presents for: Individual review  Accompanied by: Self  Diabetes education in the past 24mo: Yes  Focus of Visit: Assistance w/ making life changes, Monitoring  Diabetes type: Type 2  Date of diagnosis: 2018  Disease course: Improving  How confident are you filling out medical forms by yourself:: Extremely  Diabetes management related comments/concerns: Glucose levels slow to decrease.  Transportation concerns: No  Difficulty affording diabetes medication?: No  Difficulty affording diabetes testing supplies?: No  Other concerns:: None  Cultural Influences/Ethnic Background:  Other    Diabetes Symptoms & Complications:  Fatigue: Yes  Neuropathy: Yes  Polydipsia: No  Polyphagia: No  Polyuria: No  Visual change: No  Slow healing wounds: No  Symptom course: Stable  Weight trend: Increasing (Weight is up 5# since last visit in August but still down 17# from October 2020.)  Complications assessed today?: No  CVA: No  Heart disease: No  Peripheral neuropathy: No    Patient Problem List and Family Medical History reviewed for relevant medical history, current medical status, and diabetes risk factors.    Vitals:  /76 (BP Location: Left arm, Cuff Size: Adult Large)   Pulse 66   Ht 1.797 m (5' 10.75\")   Wt 139.7 kg (308 lb)   SpO2 98%   BMI 43.26 kg/m    Estimated body mass index is 43.26 kg/m  as calculated from the following:    Height as of this encounter: 1.797 m (5' 10.75\").    Weight as of this encounter: 139.7 kg (308 lb).   Last 3 BP:   BP Readings from Last 3 Encounters:   09/27/21 142/76   08/16/21 140/72   08/13/21 (!) 142/80       History   Smoking Status     Never Smoker   Smokeless Tobacco     Former User     Types: Snuff     Quit date: 10/1/2015       Labs:  Lab Results   Component Value Date    A1C 6.3 08/13/2021    A1C 6.2 05/14/2021     Lab Results   Component Value Date     08/13/2021 "     10/14/2020     Lab Results   Component Value Date    LDL 55 08/13/2021    LDL 58 07/13/2020     HDL Cholesterol   Date Value Ref Range Status   07/13/2020 35 (L) >39 mg/dL Final     Direct Measure HDL   Date Value Ref Range Status   08/13/2021 37 (L) >=40 mg/dL Final     Comment:     0-19 years:       Greater than or equal to 45 mg/dL   Low: Less than 40 mg/dL   Borderline low: 40-44 mg/dL     20 years and older:   Female: Greater than or equal to 50 mg/dL   Male:   Greater than or equal to 40 mg/dL        ]  GFR Estimate   Date Value Ref Range Status   08/13/2021 >90 >60 mL/min/1.73m2 Final     Comment:     As of July 11, 2021, eGFR is calculated by the CKD-EPI creatinine equation, without race adjustment. eGFR can be influenced by muscle mass, exercise, and diet. The reported eGFR is an estimation only and is only applicable if the renal function is stable.   10/14/2020 >90 >60 mL/min/[1.73_m2] Final     Comment:     Non  GFR Calc  Starting 12/18/2018, serum creatinine based estimated GFR (eGFR) will be   calculated using the Chronic Kidney Disease Epidemiology Collaboration   (CKD-EPI) equation.       GFR Estimate If Black   Date Value Ref Range Status   10/14/2020 >90 >60 mL/min/[1.73_m2] Final     Comment:      GFR Calc  Starting 12/18/2018, serum creatinine based estimated GFR (eGFR) will be   calculated using the Chronic Kidney Disease Epidemiology Collaboration   (CKD-EPI) equation.       Lab Results   Component Value Date    CR 0.70 08/13/2021    CR 0.74 10/14/2020     No results found for: MICROALBUMIN    Healthy Eating:  Healthy Eating Assessed Today: Yes  Cultural/Mormonism diet restrictions?: No  Meal planning/habits: None, Avoiding sweets, Low carb, Other (Low sodium)  How many times a week on average do you eat food made away from home (restaurant/take-out)?: 2  Meals include: Breakfast, Lunch, Dinner  Breakfast: 3-4 eggs with cheese with ham or turkey -  somtimes veggies - water or diet mountain dew.  Lunch: salad with veggies, nuts, meat, cheese - raspberry vinegarette  or sandwich or fast food (usually no fries) - diet mountain dew  Dinner: meat with 1-2 slices toast or veggies  - diet mountain dew or water  Snacks: peanut butter granola bars - 2 each  Beverages: Water, Diet soda, Milk  Has patient met with a dietitian in the past?: Yes    Being Active:  Being Active Assessed Today: Yes  Exercise:: Currently not exercising  Barrier to exercise: None    Monitoring:  Monitoring Assessed Today: Yes  Did patient bring glucose meter to appointment? : Yes  Blood Glucose Meter: ContourNext  Times checking blood sugar at home (number): 2  Times checking blood sugar at home (per): Day  Blood glucose trend: Decreasing  Fasting-157, 159, 182, 146, 135, 165, 168, 152, 161, 169, 161, 168, 165, 184  Before supper-90  2 hours post supper-173, 182, 191, 165, 181, 214  Two week average is 164.     Taking Medications:  Diabetes Medication(s)     Biguanides       metFORMIN (GLUCOPHAGE-XR) 500 MG 24 hr tablet    TAKE 2 TABLETS BY MOUTH 2 TIMES A DAY WITH MEALS    Sulfonylureas       glimepiride (AMARYL) 2 MG tablet    Take two tablets ( 4 mg ) by mouth in the morning and one tablet (4 mg) in the evening with meals.    Incretin Mimetic Agents (GLP-1 Receptor Agonists)       semaglutide (OZEMPIC, 1 MG/DOSE,) 2 MG/1.5ML pen    Inject 1 mg Subcutaneous every 7 days          Taking Medication Assessed Today: Yes  Current Treatments: Oral Medication (taken by mouth), Non-insulin Injectables, Diet (Metformin ER 1000 mg bid, Glimepiride 4 mg am/2 mg evening, Ozempic 0.50 mg weekly)  Problems taking diabetes medications regularly?: No  Diabetes medication side effects?: No    Problem Solving:  Problem Solving Assessed Today: Yes  Is the patient at risk for hypoglycemia?: Yes  Hypoglycemia Frequency: Rarely  Hypoglycemia Treatment: Other food  Is the patient at risk for DKA?:  No    Hypoglycemia symptoms  Feeling shaky: Yes    Reducing Risks:  Reducing Risks Assessed Today: No  Diabetes Risks: Family History, Sedentary Lifestyle  CAD Risks: Diabetes Mellitus, Male sex, Obesity, Sedentary lifestyle  Has dilated eye exam at least once a year?: Yes  Sees dentist every 6 months?: Yes  Feet checked by healthcare provider in the last year?: Yes    Healthy Coping:  Healthy Coping Assessed Today: Yes  Emotional response to diabetes: Acceptance, Concern for health and well-being  Stage of change: MAINTENANCE (Working to maintain change, with risk of relapse)  Patient Activation Measure Survey Score:  TED Score (Last Two) 10/11/2018   TED Raw Score 30   Activation Score 56   TED Level 3     Diabetes knowledge and skills assessment:   Patient is knowledgeable in diabetes management concepts related to: Healthy Eating, Being Active, Monitoring, Taking Medication, Problem Solving, Reducing Risks and Healthy Coping    Patient needs further education on the following diabetes management concepts: Taking Medication    Based on learning assessment above, most appropriate setting for further diabetes education would be: Individual setting.      INTERVENTIONS:    Education provided today on:  AADE Self-Care Behaviors:  Taking Medication: Dose adjustment Ozempic.    Opportunities for ongoing education and support in diabetes-self management were discussed.    Pt verbalized understanding of concepts discussed and recommendations provided today.       Education Materials Provided:  Ozempic Savings Card      ASSESSMENT:  Glucose levels with some improvement with change from Victoza to Ozempic as previous average was 171 and today's average was 163 but not yet to target.  No weight loss.      Patient's most recent   Lab Results   Component Value Date    A1C 6.3 08/13/2021    A1C 6.2 05/14/2021    is meeting goal of <7.0    PLAN  Continue current efforts to eat a healthy, low carbohydrate diet.  Try to get some  daily exercise.  Increase Ozempic to 1 mg weekly.   See Patient Instructions for co-developed, patient-stated behavior change goals.  AVS printed and provided to patient today.  Follow up Nov 15th for glucose review and A1c check.      Time Spent: 20 minutes  Encounter Type: Individual    Any diabetes medication dose changes were made via the CDE Protocol and Collaborative Practice Agreement with the patient's referring provider. A copy of this encounter was shared with the provider.

## 2021-10-05 ENCOUNTER — TELEPHONE (OUTPATIENT)
Dept: EDUCATION SERVICES | Facility: HOSPITAL | Age: 46
End: 2021-10-05

## 2021-10-05 DIAGNOSIS — I10 BENIGN ESSENTIAL HYPERTENSION: ICD-10-CM

## 2021-10-05 NOTE — TELEPHONE ENCOUNTER
Pt called he needs an updated Rx for Ozempic sent to the pharmacy. He is now taking 1 ml instead of 0.5ml. He is not able to refill because of dose change.

## 2021-10-05 NOTE — TELEPHONE ENCOUNTER
Harmony Bird, JACOB  You 1 hour ago (11:39 AM)     JL    I called pharmacy - [facility].  Order for Ozempic 1 mg weekly was sent on 9/27.  It is ready to be picked up at pharmacy.  Can you call patient and let him know.  Not sure why he was told there was no order.   Thanks!     Message text

## 2021-10-06 RX ORDER — DILTIAZEM HYDROCHLORIDE 240 MG/1
CAPSULE, EXTENDED RELEASE ORAL
Qty: 90 CAPSULE | Refills: 0 | Status: SHIPPED | OUTPATIENT
Start: 2021-10-06 | End: 2021-12-28

## 2021-11-01 ENCOUNTER — TELEPHONE (OUTPATIENT)
Dept: EDUCATION SERVICES | Facility: HOSPITAL | Age: 46
End: 2021-11-01

## 2021-11-01 DIAGNOSIS — E11.9 TYPE 2 DIABETES MELLITUS WITHOUT COMPLICATION, WITHOUT LONG-TERM CURRENT USE OF INSULIN (H): Primary | ICD-10-CM

## 2021-11-15 ENCOUNTER — HOSPITAL ENCOUNTER (OUTPATIENT)
Dept: EDUCATION SERVICES | Facility: HOSPITAL | Age: 46
Discharge: HOME | End: 2021-11-15
Attending: NURSE PRACTITIONER | Admitting: NURSE PRACTITIONER
Payer: COMMERCIAL

## 2021-11-15 VITALS
HEART RATE: 71 BPM | OXYGEN SATURATION: 98 % | SYSTOLIC BLOOD PRESSURE: 102 MMHG | BODY MASS INDEX: 41.61 KG/M2 | RESPIRATION RATE: 16 BRPM | HEIGHT: 72 IN | DIASTOLIC BLOOD PRESSURE: 82 MMHG | WEIGHT: 307.2 LBS

## 2021-11-15 DIAGNOSIS — E11.9 TYPE 2 DIABETES MELLITUS WITHOUT COMPLICATION, WITHOUT LONG-TERM CURRENT USE OF INSULIN (H): Primary | ICD-10-CM

## 2021-11-15 LAB — HBA1C MFR BLD: 6.9 % (ref 0–5.7)

## 2021-11-15 PROCEDURE — 83036 HEMOGLOBIN GLYCOSYLATED A1C: CPT | Performed by: FAMILY MEDICINE

## 2021-11-15 PROCEDURE — 97803 MED NUTRITION INDIV SUBSEQ: CPT | Performed by: DIETITIAN, REGISTERED

## 2021-11-15 ASSESSMENT — ANXIETY QUESTIONNAIRES
4. TROUBLE RELAXING: NOT AT ALL
2. NOT BEING ABLE TO STOP OR CONTROL WORRYING: NOT AT ALL
IF YOU CHECKED OFF ANY PROBLEMS ON THIS QUESTIONNAIRE, HOW DIFFICULT HAVE THESE PROBLEMS MADE IT FOR YOU TO DO YOUR WORK, TAKE CARE OF THINGS AT HOME, OR GET ALONG WITH OTHER PEOPLE: NOT DIFFICULT AT ALL
GAD7 TOTAL SCORE: 0
1. FEELING NERVOUS, ANXIOUS, OR ON EDGE: NOT AT ALL
6. BECOMING EASILY ANNOYED OR IRRITABLE: NOT AT ALL
3. WORRYING TOO MUCH ABOUT DIFFERENT THINGS: NOT AT ALL
5. BEING SO RESTLESS THAT IT IS HARD TO SIT STILL: NOT AT ALL
7. FEELING AFRAID AS IF SOMETHING AWFUL MIGHT HAPPEN: NOT AT ALL

## 2021-11-15 ASSESSMENT — MIFFLIN-ST. JEOR: SCORE: 2321.45

## 2021-11-15 ASSESSMENT — PAIN SCALES - GENERAL: PAINLEVEL: MODERATE PAIN (4)

## 2021-11-15 NOTE — LETTER
11/15/2021        RE: Bucky Franco  [id number]   [place] [address]        Diabetes Self-Management Education & Support    Presents for: Individual review    SUBJECTIVE/OBJECTIVE:  Presents for: Individual review  Accompanied by: Self  Diabetes education in the past 24mo: Yes  Focus of Visit: Assistance w/ making life changes  Diabetes type: Type 2  Date of diagnosis: 2018  Disease course: Stable  How confident are you filling out medical forms by yourself:: Extremely  Diabetes management related comments/concerns: No concerns.  Transportation concerns: No  Difficulty affording diabetes medication?: No  Difficulty affording diabetes testing supplies?: No  Other concerns:: None  Cultural Influences/Ethnic Background:  Other    Diabetes Symptoms & Complications:  Fatigue: No  Neuropathy: Yes  Polydipsia: No  Polyphagia: No  Polyuria: No  Visual change: No  Slow healing wounds: No  Symptom course: Stable  Weight trend: Decreasing (Weight is down 17# in the past year.  No weight loss however since starting Ozempic.)  Complications assessed today?: No  CVA: No  Heart disease: No  Peripheral neuropathy: No    Patient Problem List and Family Medical History reviewed for relevant medical history, current medical status, and diabetes risk factors.    Vitals:  /82   Pulse 71   Resp 16   Ht 1.829 m (6')   Wt 139.3 kg (307 lb 3.2 oz)   SpO2 98%   BMI 41.66 kg/m    Estimated body mass index is 41.66 kg/m  as calculated from the following:    Height as of this encounter: 1.829 m (6').    Weight as of this encounter: 139.3 kg (307 lb 3.2 oz).   Last 3 BP:   BP Readings from Last 3 Encounters:   11/15/21 102/82   09/27/21 142/76   08/16/21 140/72       History   Smoking Status     Never Smoker   Smokeless Tobacco     Former User     Types: Snuff     Quit date: 10/1/2015       Labs:  Lab Results   Component Value Date    A1C 6.9 11/15/2021     Lab Results   Component Value Date     08/13/2021      10/14/2020     Lab Results   Component Value Date    LDL 55 08/13/2021    LDL 58 07/13/2020     HDL Cholesterol   Date Value Ref Range Status   07/13/2020 35 (L) >39 mg/dL Final     Direct Measure HDL   Date Value Ref Range Status   08/13/2021 37 (L) >=40 mg/dL Final     Comment:     0-19 years:       Greater than or equal to 45 mg/dL   Low: Less than 40 mg/dL   Borderline low: 40-44 mg/dL     20 years and older:   Female: Greater than or equal to 50 mg/dL   Male:   Greater than or equal to 40 mg/dL        ]  GFR Estimate   Date Value Ref Range Status   08/13/2021 >90 >60 mL/min/1.73m2 Final     Comment:     As of July 11, 2021, eGFR is calculated by the CKD-EPI creatinine equation, without race adjustment. eGFR can be influenced by muscle mass, exercise, and diet. The reported eGFR is an estimation only and is only applicable if the renal function is stable.   10/14/2020 >90 >60 mL/min/[1.73_m2] Final     Comment:     Non  GFR Calc  Starting 12/18/2018, serum creatinine based estimated GFR (eGFR) will be   calculated using the Chronic Kidney Disease Epidemiology Collaboration   (CKD-EPI) equation.       GFR Estimate If Black   Date Value Ref Range Status   10/14/2020 >90 >60 mL/min/[1.73_m2] Final     Comment:      GFR Calc  Starting 12/18/2018, serum creatinine based estimated GFR (eGFR) will be   calculated using the Chronic Kidney Disease Epidemiology Collaboration   (CKD-EPI) equation.       Lab Results   Component Value Date    CR 0.70 08/13/2021    CR 0.74 10/14/2020     No results found for: MICROALBUMIN    Healthy Eating:  Healthy Eating Assessed Today: Yes  Cultural/Zoroastrian diet restrictions?: No  Meal planning/habits: None,Avoiding sweets,Low carb,Other (Low sodium)  How many times a week on average do you eat food made away from home (restaurant/take-out)?: 2  Meals include: Breakfast,Lunch,Dinner  Breakfast: 3-4 eggs with cheese with ham or turkey - somtimes veggies -  water or diet mountain dew.  Lunch: salad with veggies, nuts, meat, cheese - raspberry vinegarette  or sandwich or fast food (usually no fries) - diet mountain dew  Dinner: meat with 1-2 slices toast or veggies  - diet mountain dew or water  Snacks: peanut butter granola bars - 2 each  Beverages: Water,Diet soda,Milk  Has patient met with a dietitian in the past?: Yes    Being Active:  Being Active Assessed Today: Yes  Exercise:: Currently not exercising  Barrier to exercise: None    Monitoring:  Monitoring Assessed Today: Yes  Did patient bring glucose meter to appointment? : Yes  Blood Glucose Meter: ContourNext  Times checking blood sugar at home (number): 2  Times checking blood sugar at home (per): Day  Blood glucose trend: Decreasing  Fasting-191, 167, 161, 122, 128, 159, 117, 115, 151, 121, 121, 135, 178  Evening-208, 143, 147, 151, 95, 179    Taking Medications:  Diabetes Medication(s)     Biguanides       metFORMIN (GLUCOPHAGE-XR) 500 MG 24 hr tablet    TAKE 2 TABLETS BY MOUTH 2 TIMES A DAY WITH MEALS    Sulfonylureas       glimepiride (AMARYL) 2 MG tablet    Take two tablets ( 4 mg ) by mouth in the morning and one tablet (4 mg) in the evening with meals.    Incretin Mimetic Agents (GLP-1 Receptor Agonists)       semaglutide (OZEMPIC, 1 MG/DOSE,) 2 MG/1.5ML pen    Inject 1 mg Subcutaneous every 7 days          Taking Medication Assessed Today: Yes  Current Treatments: Oral Medication (taken by mouth),Non-insulin Injectables,Diet (Metformin ER 1000 mg bid, Glimepiride 4 mg am/2 mg evening, Ozempic 1 mg weekly)  Problems taking diabetes medications regularly?: No  Diabetes medication side effects?: No    Problem Solving:  Problem Solving Assessed Today: Yes  Is the patient at risk for hypoglycemia?: Yes  Hypoglycemia Frequency: Rarely  Hypoglycemia Treatment: Other food  Is the patient at risk for DKA?: No    Hypoglycemia symptoms  Feeling shaky: Yes    Reducing Risks:  Reducing Risks Assessed Today:  No  Diabetes Risks: Family History,Sedentary Lifestyle  CAD Risks: Diabetes Mellitus,Male sex,Obesity,Sedentary lifestyle  Has dilated eye exam at least once a year?: Yes  Sees dentist every 6 months?: Yes  Feet checked by healthcare provider in the last year?: Yes    Healthy Coping:  Healthy Coping Assessed Today: Yes  Emotional response to diabetes: Acceptance,Concern for health and well-being  Stage of change: MAINTENANCE (Working to maintain change, with risk of relapse)  Patient Activation Measure Survey Score:  TED Score (Last Two) 10/11/2018   TED Raw Score 30   Activation Score 56   TED Level 3     Diabetes knowledge and skills assessment:   Patient is knowledgeable in diabetes management concepts related to: Healthy Eating, Being Active, Monitoring, Taking Medication, Problem Solving, Reducing Risks and Healthy Coping    Patient needs further education on the following diabetes management concepts: No concerns.  Pt is doing well.     Based on learning assessment above, most appropriate setting for further diabetes education would be: Individual setting.      INTERVENTIONS:    Education provided today on:  AADE Self-Care Behaviors:  Reducing Risks: annual eye exam and A1C - goals, relating to blood glucose levels, how often to check    Opportunities for ongoing education and support in diabetes-self management were discussed.    Pt verbalized understanding of concepts discussed and recommendations provided today.       Education Materials Provided:  No new materials today.    ASSESSMENT:  A1c today was 6.9% which is in target but up from previous A1c of 6.3% in August.  Pt has not noted any appetite change or change in weight since changing from Victoza to Ozempic.  He wishes to continue at this time.      Patient's most recent   Lab Results   Component Value Date    A1C 6.9 11/15/2021    is meeting goal of <7.0    PLAN  Continue efforts to eat a healthy, low carbohydrate meal plan.  Try to get some routine  exercise.  Test glucose 1-2x/day.   See Patient Instructions for co-developed, patient-stated behavior change goals.  AVS printed and provided to patient today.   Follow up in six months.     Time Spent: 25 minutes  Encounter Type: Individual    Any diabetes medication dose changes were made via the CDE Protocol and Collaborative Practice Agreement with the patient's referring provider. A copy of this encounter was shared with the provider.          Sincerely,        Harmony Bird RD

## 2021-11-15 NOTE — PATIENT INSTRUCTIONS
-Continue to follow healthy, low carbohydrate meal plan.   -Try to get some exercise most days of the week.    -Test glucose 1-2x/day - fasting and 2 hours after supper are good choices.  -Target levels are fasting , 2 hours after supper < 180.  -Keep taking same diabetes medications.    -A1c today was 6.9%.  Goal  Is < 7.0%.   -Follow up in six months or sooner if indicated.   -Call with any concerns - MICHAEL Aviles, [phone].

## 2021-11-15 NOTE — PROGRESS NOTES
Diabetes Self-Management Education & Support    Presents for: Individual review    SUBJECTIVE/OBJECTIVE:  Presents for: Individual review  Accompanied by: Self  Diabetes education in the past 24mo: Yes  Focus of Visit: Assistance w/ making life changes  Diabetes type: Type 2  Date of diagnosis: 2018  Disease course: Stable  How confident are you filling out medical forms by yourself:: Extremely  Diabetes management related comments/concerns: No concerns.  Transportation concerns: No  Difficulty affording diabetes medication?: No  Difficulty affording diabetes testing supplies?: No  Other concerns:: None  Cultural Influences/Ethnic Background:  Other    Diabetes Symptoms & Complications:  Fatigue: No  Neuropathy: Yes  Polydipsia: No  Polyphagia: No  Polyuria: No  Visual change: No  Slow healing wounds: No  Symptom course: Stable  Weight trend: Decreasing (Weight is down 17# in the past year.  No weight loss however since starting Ozempic.)  Complications assessed today?: No  CVA: No  Heart disease: No  Peripheral neuropathy: No    Patient Problem List and Family Medical History reviewed for relevant medical history, current medical status, and diabetes risk factors.    Vitals:  /82   Pulse 71   Resp 16   Ht 1.829 m (6')   Wt 139.3 kg (307 lb 3.2 oz)   SpO2 98%   BMI 41.66 kg/m    Estimated body mass index is 41.66 kg/m  as calculated from the following:    Height as of this encounter: 1.829 m (6').    Weight as of this encounter: 139.3 kg (307 lb 3.2 oz).   Last 3 BP:   BP Readings from Last 3 Encounters:   11/15/21 102/82   09/27/21 142/76   08/16/21 140/72       History   Smoking Status     Never Smoker   Smokeless Tobacco     Former User     Types: Snuff     Quit date: 10/1/2015       Labs:  Lab Results   Component Value Date    A1C 6.9 11/15/2021     Lab Results   Component Value Date     08/13/2021     10/14/2020     Lab Results   Component Value Date    LDL 55 08/13/2021    LDL 58  07/13/2020     HDL Cholesterol   Date Value Ref Range Status   07/13/2020 35 (L) >39 mg/dL Final     Direct Measure HDL   Date Value Ref Range Status   08/13/2021 37 (L) >=40 mg/dL Final     Comment:     0-19 years:       Greater than or equal to 45 mg/dL   Low: Less than 40 mg/dL   Borderline low: 40-44 mg/dL     20 years and older:   Female: Greater than or equal to 50 mg/dL   Male:   Greater than or equal to 40 mg/dL        ]  GFR Estimate   Date Value Ref Range Status   08/13/2021 >90 >60 mL/min/1.73m2 Final     Comment:     As of July 11, 2021, eGFR is calculated by the CKD-EPI creatinine equation, without race adjustment. eGFR can be influenced by muscle mass, exercise, and diet. The reported eGFR is an estimation only and is only applicable if the renal function is stable.   10/14/2020 >90 >60 mL/min/[1.73_m2] Final     Comment:     Non  GFR Calc  Starting 12/18/2018, serum creatinine based estimated GFR (eGFR) will be   calculated using the Chronic Kidney Disease Epidemiology Collaboration   (CKD-EPI) equation.       GFR Estimate If Black   Date Value Ref Range Status   10/14/2020 >90 >60 mL/min/[1.73_m2] Final     Comment:      GFR Calc  Starting 12/18/2018, serum creatinine based estimated GFR (eGFR) will be   calculated using the Chronic Kidney Disease Epidemiology Collaboration   (CKD-EPI) equation.       Lab Results   Component Value Date    CR 0.70 08/13/2021    CR 0.74 10/14/2020     No results found for: MICROALBUMIN    Healthy Eating:  Healthy Eating Assessed Today: Yes  Cultural/Anabaptism diet restrictions?: No  Meal planning/habits: None,Avoiding sweets,Low carb,Other (Low sodium)  How many times a week on average do you eat food made away from home (restaurant/take-out)?: 2  Meals include: Breakfast,Lunch,Dinner  Breakfast: 3-4 eggs with cheese with ham or turkey - somtimes veggies - water or diet mountain dew.  Lunch: salad with veggies, nuts, meat, cheese -  raspberry vinegarette  or sandwich or fast food (usually no fries) - diet mountain dew  Dinner: meat with 1-2 slices toast or veggies  - diet mountain dew or water  Snacks: peanut butter granola bars - 2 each  Beverages: Water,Diet soda,Milk  Has patient met with a dietitian in the past?: Yes    Being Active:  Being Active Assessed Today: Yes  Exercise:: Currently not exercising  Barrier to exercise: None    Monitoring:  Monitoring Assessed Today: Yes  Did patient bring glucose meter to appointment? : Yes  Blood Glucose Meter: ContourNext  Times checking blood sugar at home (number): 2  Times checking blood sugar at home (per): Day  Blood glucose trend: Decreasing  Fasting-191, 167, 161, 122, 128, 159, 117, 115, 151, 121, 121, 135, 178  Evening-208, 143, 147, 151, 95, 179    Taking Medications:  Diabetes Medication(s)     Biguanides       metFORMIN (GLUCOPHAGE-XR) 500 MG 24 hr tablet    TAKE 2 TABLETS BY MOUTH 2 TIMES A DAY WITH MEALS    Sulfonylureas       glimepiride (AMARYL) 2 MG tablet    Take two tablets ( 4 mg ) by mouth in the morning and one tablet (4 mg) in the evening with meals.    Incretin Mimetic Agents (GLP-1 Receptor Agonists)       semaglutide (OZEMPIC, 1 MG/DOSE,) 2 MG/1.5ML pen    Inject 1 mg Subcutaneous every 7 days          Taking Medication Assessed Today: Yes  Current Treatments: Oral Medication (taken by mouth),Non-insulin Injectables,Diet (Metformin ER 1000 mg bid, Glimepiride 4 mg am/2 mg evening, Ozempic 1 mg weekly)  Problems taking diabetes medications regularly?: No  Diabetes medication side effects?: No    Problem Solving:  Problem Solving Assessed Today: Yes  Is the patient at risk for hypoglycemia?: Yes  Hypoglycemia Frequency: Rarely  Hypoglycemia Treatment: Other food  Is the patient at risk for DKA?: No    Hypoglycemia symptoms  Feeling shaky: Yes    Reducing Risks:  Reducing Risks Assessed Today: No  Diabetes Risks: Family History,Sedentary Lifestyle  CAD Risks: Diabetes  Mellitus,Male sex,Obesity,Sedentary lifestyle  Has dilated eye exam at least once a year?: Yes  Sees dentist every 6 months?: Yes  Feet checked by healthcare provider in the last year?: Yes    Healthy Coping:  Healthy Coping Assessed Today: Yes  Emotional response to diabetes: Acceptance,Concern for health and well-being  Stage of change: MAINTENANCE (Working to maintain change, with risk of relapse)  Patient Activation Measure Survey Score:  TED Score (Last Two) 10/11/2018   TED Raw Score 30   Activation Score 56   TED Level 3     Diabetes knowledge and skills assessment:   Patient is knowledgeable in diabetes management concepts related to: Healthy Eating, Being Active, Monitoring, Taking Medication, Problem Solving, Reducing Risks and Healthy Coping    Patient needs further education on the following diabetes management concepts: No concerns.  Pt is doing well.     Based on learning assessment above, most appropriate setting for further diabetes education would be: Individual setting.      INTERVENTIONS:    Education provided today on:  AADE Self-Care Behaviors:  Reducing Risks: annual eye exam and A1C - goals, relating to blood glucose levels, how often to check    Opportunities for ongoing education and support in diabetes-self management were discussed.    Pt verbalized understanding of concepts discussed and recommendations provided today.       Education Materials Provided:  No new materials today.    ASSESSMENT:  A1c today was 6.9% which is in target but up from previous A1c of 6.3% in August.  Pt has not noted any appetite change or change in weight since changing from Victoza to Ozempic.  He wishes to continue at this time.      Patient's most recent   Lab Results   Component Value Date    A1C 6.9 11/15/2021    is meeting goal of <7.0    PLAN  Continue efforts to eat a healthy, low carbohydrate meal plan.  Try to get some routine exercise.  Test glucose 1-2x/day.   See Patient Instructions for  co-developed, patient-stated behavior change goals.  AVS printed and provided to patient today.   Follow up in six months.     Time Spent: 25 minutes  Encounter Type: Individual    Any diabetes medication dose changes were made via the CDE Protocol and Collaborative Practice Agreement with the patient's referring provider. A copy of this encounter was shared with the provider.

## 2021-11-16 ASSESSMENT — ANXIETY QUESTIONNAIRES: GAD7 TOTAL SCORE: 0

## 2021-11-16 ASSESSMENT — PATIENT HEALTH QUESTIONNAIRE - PHQ9: SUM OF ALL RESPONSES TO PHQ QUESTIONS 1-9: 4

## 2021-12-14 ENCOUNTER — TRANSFERRED RECORDS (OUTPATIENT)
Dept: HEALTH INFORMATION MANAGEMENT | Facility: HOSPITAL | Age: 46
End: 2021-12-14
Payer: COMMERCIAL

## 2021-12-14 LAB — RETINOPATHY: NEGATIVE

## 2022-01-24 DIAGNOSIS — E11.9 TYPE 2 DIABETES MELLITUS WITHOUT COMPLICATION, WITHOUT LONG-TERM CURRENT USE OF INSULIN (H): ICD-10-CM

## 2022-01-26 DIAGNOSIS — E11.9 TYPE 2 DIABETES MELLITUS WITHOUT COMPLICATION, WITHOUT LONG-TERM CURRENT USE OF INSULIN (H): ICD-10-CM

## 2022-01-26 RX ORDER — SEMAGLUTIDE 1.34 MG/ML
INJECTION, SOLUTION SUBCUTANEOUS
Qty: 3 ML | Refills: 1 | Status: SHIPPED | OUTPATIENT
Start: 2022-01-26 | End: 2022-03-22

## 2022-01-26 RX ORDER — SEMAGLUTIDE 1.34 MG/ML
INJECTION, SOLUTION SUBCUTANEOUS
Qty: 3 ML | Refills: 0 | OUTPATIENT
Start: 2022-01-26

## 2022-01-27 ENCOUNTER — LAB (OUTPATIENT)
Dept: FAMILY MEDICINE | Facility: OTHER | Age: 47
End: 2022-01-27
Attending: FAMILY MEDICINE
Payer: COMMERCIAL

## 2022-01-27 DIAGNOSIS — Z20.822 SUSPECTED 2019 NOVEL CORONAVIRUS INFECTION: ICD-10-CM

## 2022-01-27 PROCEDURE — U0003 INFECTIOUS AGENT DETECTION BY NUCLEIC ACID (DNA OR RNA); SEVERE ACUTE RESPIRATORY SYNDROME CORONAVIRUS 2 (SARS-COV-2) (CORONAVIRUS DISEASE [COVID-19]), AMPLIFIED PROBE TECHNIQUE, MAKING USE OF HIGH THROUGHPUT TECHNOLOGIES AS DESCRIBED BY CMS-2020-01-R: HCPCS

## 2022-01-27 PROCEDURE — U0005 INFEC AGEN DETEC AMPLI PROBE: HCPCS

## 2022-01-29 LAB — SARS-COV-2 RNA RESP QL NAA+PROBE: NEGATIVE

## 2022-02-14 NOTE — PROGRESS NOTES
Assessment & Plan     Type 2 diabetes mellitus without complication, without long-term current use of insulin (H)  A1c increase to 6.6 from 6.3  - Hemoglobin A1c  - c/w glimepiride 4mg bid  - discontinue metformin 1000mg bid  - c/w ozempic 1mg  - start jardiance 10mg   - diabetic education visit on 2021 for recheck   - A1c and BMP at diabetic visit, lab orders placed    Benign essential hypertension  BP above goal, but has not taken his medications this morning.  - c/w diltiazem 120 + 240   - c/w lisinopril 40mg  - c/w metoprolol succincate 12.5mg     Mixed hyperlipidemia  LDL (2021): 55  - c/w rosuvastatin 10mg        See Patient Instructions    Return in about 6 months (around 8/15/2022) for Diabetic Visit, Lab Work.    Naomi De Santiago MD  [facility] - BRITTANY Lawler is a 46 year old who presents for the following health issues     HPI     COVID booster (agrees and updated today )  Influenza (declines d/t getting sick with previous vaccine)    Diabetes Follow-up    How often are you checking your blood sugar? Two times daily  Blood sugar testing frequency justification:  Uncontrolled diabetes  What time of day are you checking your blood sugars (select all that apply)?  Before and after meals  Have you had any blood sugars above 200?  Yes   Have you had any blood sugars below 70?  No    What symptoms do you notice when your blood sugar is low?  None    What concerns do you have today about your diabetes? Other: Morning fasting is high and nightly are low     Do you have any of these symptoms? (Select all that apply)  No numbness or tingling in feet.  No redness, sores or blisters on feet.  No complaints of excessive thirst.  No reports of blurry vision.  No significant changes to weight.    - Last A1c (11/15/2021): 6.9  -  got sick and after that blood sugars are running high. Stomach flu, COVID negative, but was not tested for influenza.  Morning 180s.  Night time 130  - Diet:  - Exercise:  - glimepiride 4mg bid  - metformin 1000mg bid, does not dissolve, in fact they swell. Has lap band.   - ozempic 1mg  - ASA (81mg), ACE (lisinopril), statin (rosuvastatin)  - last eye exam (12/14/21) [facility]. No diabetic retinopathy seen.   - diabetic education on 5/16/2022 with [facility]      Hyperlipidemia Follow-Up      Are you regularly taking any medication or supplement to lower your cholesterol?   Yes- Crestor    Are you having muscle aches or other side effects that you think could be caused by your cholesterol lowering medication?  No  LDL (8/13/2021): 55  - rosuvastatin     Hypertension Follow-up      Do you check your blood pressure regularly outside of the clinic? No     Are you following a low salt diet? Yes    Are your blood pressures ever more than 140 on the top number (systolic) OR more   than 90 on the bottom number (diastolic), for example 140/90? No    - diltiazem 120 + 240   - lisinopril 40mg  - metoprolol succincate 12.5mg   - home blood pressure: 129 to 136/80s.  Upper arm cuff machine    - did not take medications yet this morning    BP Readings from Last 6 Encounters:   02/15/22 (!) 148/80   11/15/21 102/82   09/27/21 142/76   08/16/21 140/72   08/13/21 (!) 142/80   05/14/21 140/78         BP Readings from Last 2 Encounters:   02/15/22 (!) 148/80   11/15/21 102/82     Hemoglobin A1C POCT (%)   Date Value   11/15/2021 6.9 (A)   05/14/2021 6.2 (H)     Hemoglobin A1C (%)   Date Value   08/13/2021 6.3 (H)     LDL Cholesterol Calculated (mg/dL)   Date Value   08/13/2021 55   07/13/2020 58   01/10/2019 50         Review of Systems   Constitutional: Negative for chills and fever.   HENT: Negative for congestion.    Respiratory: Negative for shortness of breath and wheezing.    Cardiovascular: Negative for chest pain and palpitations.   Gastrointestinal: Negative for abdominal pain.   Psychiatric/Behavioral: Negative for mood changes.          Objective     BP (!) 148/80 (BP Location: Left arm, Patient Position: Sitting, Cuff Size: Adult Large)   Pulse 75   Temp 97.9  F (36.6  C) (Tympanic)   Resp 18   Wt 135.6 kg (299 lb)   SpO2 97%   BMI 40.55 kg/m    Body mass index is 40.55 kg/m .  Physical Exam  Constitutional:       General: He is not in acute distress.     Appearance: He is not ill-appearing.   Cardiovascular:      Rate and Rhythm: Normal rate and regular rhythm.      Pulses: Normal pulses.      Heart sounds: No murmur heard.      Pulmonary:      Effort: Pulmonary effort is normal. No respiratory distress.      Breath sounds: No wheezing or rales.   Musculoskeletal:      Right lower leg: No edema.      Left lower leg: No edema.   Neurological:      Mental Status: He is alert.   Psychiatric:         Mood and Affect: Mood normal.          Results for orders placed or performed in visit on 02/15/22 (from the past 24 hour(s))   Hemoglobin A1c   Result Value Ref Range    Estimated Average Glucose 143 mg/dL    Hemoglobin A1C 6.6 (H) 0.0 - 5.6 %

## 2022-02-15 ENCOUNTER — IMMUNIZATION (OUTPATIENT)
Dept: FAMILY MEDICINE | Facility: OTHER | Age: 47
End: 2022-02-15
Attending: FAMILY MEDICINE
Payer: COMMERCIAL

## 2022-02-15 VITALS
HEART RATE: 75 BPM | DIASTOLIC BLOOD PRESSURE: 80 MMHG | WEIGHT: 299 LBS | SYSTOLIC BLOOD PRESSURE: 148 MMHG | OXYGEN SATURATION: 97 % | RESPIRATION RATE: 18 BRPM | TEMPERATURE: 97.9 F | BODY MASS INDEX: 40.55 KG/M2

## 2022-02-15 DIAGNOSIS — I10 BENIGN ESSENTIAL HYPERTENSION: ICD-10-CM

## 2022-02-15 DIAGNOSIS — E11.9 TYPE 2 DIABETES MELLITUS WITHOUT COMPLICATION, WITHOUT LONG-TERM CURRENT USE OF INSULIN (H): Primary | ICD-10-CM

## 2022-02-15 DIAGNOSIS — E78.2 MIXED HYPERLIPIDEMIA: ICD-10-CM

## 2022-02-15 LAB
EST. AVERAGE GLUCOSE BLD GHB EST-MCNC: 143 MG/DL
HBA1C MFR BLD: 6.6 % (ref 0–5.6)

## 2022-02-15 PROCEDURE — 0054A COVID-19,PF,PFIZER (12+ YRS): CPT

## 2022-02-15 PROCEDURE — 99214 OFFICE O/P EST MOD 30 MIN: CPT | Performed by: FAMILY MEDICINE

## 2022-02-15 PROCEDURE — 91305 COVID-19,PF,PFIZER (12+ YRS): CPT

## 2022-02-15 PROCEDURE — 36415 COLL VENOUS BLD VENIPUNCTURE: CPT | Performed by: FAMILY MEDICINE

## 2022-02-15 PROCEDURE — 83036 HEMOGLOBIN GLYCOSYLATED A1C: CPT | Performed by: FAMILY MEDICINE

## 2022-02-15 ASSESSMENT — ENCOUNTER SYMPTOMS
FEVER: 0
ABDOMINAL PAIN: 0
SHORTNESS OF BREATH: 0
PALPITATIONS: 0
WHEEZING: 0
CHILLS: 0

## 2022-02-15 ASSESSMENT — PAIN SCALES - GENERAL: PAINLEVEL: NO PAIN (0)

## 2022-02-15 NOTE — NURSING NOTE
Chief Complaint   Patient presents with     Diabetes     Lipids     Hypertension       Initial BP (!) 148/80 (BP Location: Left arm, Patient Position: Sitting, Cuff Size: Adult Large)   Pulse 75   Temp 97.9  F (36.6  C) (Tympanic)   Resp 18   Wt 135.6 kg (299 lb)   SpO2 97%   BMI 40.55 kg/m   Estimated body mass index is 40.55 kg/m  as calculated from the following:    Height as of 11/15/21: 1.829 m (6').    Weight as of this encounter: 135.6 kg (299 lb).  Medication Reconciliation: complete  Emmanuelle Del Castillo LPN

## 2022-02-21 DIAGNOSIS — E11.9 TYPE 2 DIABETES MELLITUS WITHOUT COMPLICATION, WITHOUT LONG-TERM CURRENT USE OF INSULIN (H): ICD-10-CM

## 2022-02-22 RX ORDER — ROSUVASTATIN CALCIUM 10 MG/1
TABLET, COATED ORAL
Qty: 90 TABLET | Refills: 3 | Status: SHIPPED | OUTPATIENT
Start: 2022-02-22 | End: 2023-03-16

## 2022-04-19 DIAGNOSIS — E11.9 TYPE 2 DIABETES MELLITUS WITHOUT COMPLICATION, WITHOUT LONG-TERM CURRENT USE OF INSULIN (H): ICD-10-CM

## 2022-04-20 RX ORDER — SEMAGLUTIDE 1.34 MG/ML
INJECTION, SOLUTION SUBCUTANEOUS
Qty: 3 ML | Refills: 4 | Status: SHIPPED | OUTPATIENT
Start: 2022-04-20 | End: 2022-05-16 | Stop reason: DRUGHIGH

## 2022-05-16 ENCOUNTER — HOSPITAL ENCOUNTER (OUTPATIENT)
Dept: EDUCATION SERVICES | Facility: HOSPITAL | Age: 47
Discharge: HOME | End: 2022-05-16
Attending: NURSE PRACTITIONER | Admitting: NURSE PRACTITIONER
Payer: COMMERCIAL

## 2022-05-16 VITALS
RESPIRATION RATE: 16 BRPM | SYSTOLIC BLOOD PRESSURE: 134 MMHG | HEIGHT: 72 IN | DIASTOLIC BLOOD PRESSURE: 81 MMHG | WEIGHT: 303 LBS | HEART RATE: 77 BPM | OXYGEN SATURATION: 97 % | BODY MASS INDEX: 41.04 KG/M2

## 2022-05-16 DIAGNOSIS — E11.65 TYPE 2 DIABETES MELLITUS WITH HYPERGLYCEMIA, WITHOUT LONG-TERM CURRENT USE OF INSULIN (H): Primary | ICD-10-CM

## 2022-05-16 LAB — HBA1C MFR BLD: 7.5 % (ref 0–5.7)

## 2022-05-16 PROCEDURE — G0108 DIAB MANAGE TRN  PER INDIV: HCPCS | Performed by: DIETITIAN, REGISTERED

## 2022-05-16 PROCEDURE — 83036 HEMOGLOBIN GLYCOSYLATED A1C: CPT | Performed by: FAMILY MEDICINE

## 2022-05-16 RX ORDER — SEMAGLUTIDE 2.68 MG/ML
2 INJECTION, SOLUTION SUBCUTANEOUS WEEKLY
Qty: 3 ML | Refills: 2 | Status: SHIPPED | OUTPATIENT
Start: 2022-05-16 | End: 2022-08-10

## 2022-05-16 ASSESSMENT — PAIN SCALES - GENERAL: PAINLEVEL: NO PAIN (0)

## 2022-05-16 NOTE — PROGRESS NOTES
Diabetes Self-Management Education & Support    Presents for: Individual review    SUBJECTIVE/OBJECTIVE:  Presents for: Individual review  Accompanied by: Self  Diabetes education in the past 24mo: Yes  Focus of Visit: Assistance w/ making life changes  Diabetes type: Type 2  Date of diagnosis: 2018  Disease course: Stable  How confident are you filling out medical forms by yourself:: Extremely  Diabetes management related comments/concerns: Glucose levels trending up.  Transportation concerns: No  Difficulty affording diabetes medication?: No  Difficulty affording diabetes testing supplies?: No  Other concerns:: None  Cultural Influences/Ethnic Background:  Not  or     Diabetes Symptoms & Complications:  Fatigue: Sometimes  Neuropathy: Sometimes  Polydipsia: No  Polyphagia: No  Polyuria: Yes (On Jardiance)  Visual change: No  Slow healing wounds: No  Symptom course: Stable  Weight trend: Stable (Pt has not lost weight with Ozempic.)  Complications assessed today?: No  CVA: No  Heart disease: No  Peripheral neuropathy: No    Patient Problem List and Family Medical History reviewed for relevant medical history, current medical status, and diabetes risk factors.    Vitals:  /81   Pulse 77   Resp 16   Ht 1.829 m (6')   Wt 137.4 kg (303 lb)   SpO2 97%   BMI 41.09 kg/m    Estimated body mass index is 41.09 kg/m  as calculated from the following:    Height as of this encounter: 1.829 m (6').    Weight as of this encounter: 137.4 kg (303 lb).   Last 3 BP:   BP Readings from Last 3 Encounters:   05/16/22 134/81   02/15/22 (!) 148/80   11/15/21 102/82       History   Smoking Status     Never Smoker   Smokeless Tobacco     Former User     Types: Snuff     Quit date: 10/1/2015       Labs:  Lab Results   Component Value Date    A1C 7.5 05/16/2022     Lab Results   Component Value Date     08/13/2021     10/14/2020     Lab Results   Component Value Date    LDL 55 08/13/2021    LDL 58  07/13/2020     HDL Cholesterol   Date Value Ref Range Status   07/13/2020 35 (L) >39 mg/dL Final     Direct Measure HDL   Date Value Ref Range Status   08/13/2021 37 (L) >=40 mg/dL Final     Comment:     0-19 years:       Greater than or equal to 45 mg/dL   Low: Less than 40 mg/dL   Borderline low: 40-44 mg/dL     20 years and older:   Female: Greater than or equal to 50 mg/dL   Male:   Greater than or equal to 40 mg/dL        ]  GFR Estimate   Date Value Ref Range Status   08/13/2021 >90 >60 mL/min/1.73m2 Final     Comment:     As of July 11, 2021, eGFR is calculated by the CKD-EPI creatinine equation, without race adjustment. eGFR can be influenced by muscle mass, exercise, and diet. The reported eGFR is an estimation only and is only applicable if the renal function is stable.   10/14/2020 >90 >60 mL/min/[1.73_m2] Final     Comment:     Non  GFR Calc  Starting 12/18/2018, serum creatinine based estimated GFR (eGFR) will be   calculated using the Chronic Kidney Disease Epidemiology Collaboration   (CKD-EPI) equation.       GFR Estimate If Black   Date Value Ref Range Status   10/14/2020 >90 >60 mL/min/[1.73_m2] Final     Comment:      GFR Calc  Starting 12/18/2018, serum creatinine based estimated GFR (eGFR) will be   calculated using the Chronic Kidney Disease Epidemiology Collaboration   (CKD-EPI) equation.       Lab Results   Component Value Date    CR 0.70 08/13/2021    CR 0.74 10/14/2020     No results found for: MICROALBUMIN    Healthy Eating:  Healthy Eating Assessed Today: Yes  Cultural/Spiritism diet restrictions?: No  Meal planning/habits: None, Low carb, Other (Low sodium)  How many times a week on average do you eat food made away from home (restaurant/take-out)?: 2  Meals include: Breakfast, Lunch, Dinner  Breakfast: 3-4 eggs with cheese with ham or turkey - somtimes veggies - water or diet mountain dew.  Lunch: jambalya with some rice or sandwich (2 each) - diet  mountain dew  Dinner: meat  - diet mountain dew or water  Snacks: peanut butter granola bars - 2 each or 3-4 chips a hoy cookies  Beverages: Water, Diet soda, Milk, Coffee  Has patient met with a dietitian in the past?: Yes    Being Active:  Being Active Assessed Today: Yes  Exercise:: Currently not exercising  Barrier to exercise: None    Monitoring:  Monitoring Assessed Today: Yes  Did patient bring glucose meter to appointment? : Yes  Blood Glucose Meter: ContourNext  Times checking blood sugar at home (number): 2  Times checking blood sugar at home (per): Day  Blood glucose trend: Increasing  Fasting-184, 161, 157, 178, 158, 193, 192, 174, 176, 183, 154, 147, 161, 199  Post supper-202, 235, 221, 167, 156, 194    Taking Medications:  Diabetes Medication(s)     Sodium-Glucose Co-Transporter 2 (SGLT2) Inhibitors       empagliflozin (JARDIANCE) 10 MG TABS tablet    Take 1 tablet (10 mg) by mouth daily    Sulfonylureas       glimepiride (AMARYL) 2 MG tablet    Take two tablets ( 4 mg ) by mouth in the morning and one tablet (4 mg) in the evening with meals.    Incretin Mimetic Agents (GLP-1 Receptor Agonists)       OZEMPIC, 1 MG/DOSE, 4 MG/3ML SOPN    INJECT 1 MG SUBCUTANEOUS EVERY 7 DAYS          Taking Medication Assessed Today: Yes  Current Treatments: Oral Medication (taken by mouth), Non-insulin Injectables, Diet (Glimepiride 4 mg am/2 mg evening, Ozempic 1 mg weekly, Jardiance 10 mg daily)  Problems taking diabetes medications regularly?: No  Diabetes medication side effects?: No (Metformin was difficult to swallow and keep down)    Problem Solving:  Problem Solving Assessed Today: Yes  Is the patient at risk for hypoglycemia?: Yes  Hypoglycemia Frequency: Rarely  Hypoglycemia Treatment: Other food  Is the patient at risk for DKA?: No    Hypoglycemia symptoms  Feeling shaky: Yes    Reducing Risks:  Reducing Risks Assessed Today: No  Diabetes Risks: Family History, Sedentary Lifestyle  CAD Risks: Diabetes  Mellitus, Male sex, Obesity, Sedentary lifestyle  Has dilated eye exam at least once a year?: Yes  Sees dentist every 6 months?: Yes  Feet checked by healthcare provider in the last year?: Yes    Healthy Coping:  Healthy Coping Assessed Today: Yes  Emotional response to diabetes: Acceptance, Concern for health and well-being  Stage of change: MAINTENANCE (Working to maintain change, with risk of relapse)  Patient Activation Measure Survey Score:  TED Score (Last Two) 10/11/2018   TED Raw Score 30   Activation Score 56   TED Level 3     Diabetes knowledge and skills assessment:   Patient is knowledgeable in diabetes management concepts related to: Healthy Eating, Being Active, Monitoring, Taking Medication, Problem Solving, Reducing Risks and Healthy Coping    Patient needs further education on the following diabetes management concepts: Pt is knowledgeable.     Based on learning assessment above, most appropriate setting for further diabetes education would be: Individual setting.      INTERVENTIONS:    Education provided today on:  AADE Self-Care Behaviors:  Healthy Eating: Encouraged 60 grams carbohydrate/meal, 15-30 grams/snack.   Being Active: relationship to blood glucose  Taking Medication: Dose adjustment medications indicated.  Discussed increase of Ozempic or Jardiance.    Reducing Risks: A1C - goals, relating to blood glucose levels, how often to check    Opportunities for ongoing education and support in diabetes-self management were discussed.    Pt verbalized understanding of concepts discussed and recommendations provided today.       Education Materials Provided:  No new materials today.     ASSESSMENT:  A1c today was 7.5% which is up from 6.6% at last check.  Pt feels glucose levels have trended up with change from Metformin to Jardiance.  He states he has not changed diet or exercise routine.  Desires weight loss but struggles.  After discussion desires increase dose of Ozempic vs Jardiance.   Hopefully will help with weight.     Patient's most recent   Lab Results   Component Value Date    A1C 7.5 05/16/2022    is not meeting goal of <7.0    PLAN  Limit carbohydrates to 60 grams/meal and 15-30 grams/snack.  Try to get some daily exercise.   Test glucose 2x/day.  Keep taking current dose of Glimepiride and Jardiance.   Increase Ozempic to 2 mg weekly.   See Patient Instructions for co-developed, patient-stated behavior change goals.  AVS printed and provided to patient today.   Follow up June 27th at 8:30 am.     Time Spent: 30 minutes  Encounter Type: Individual    Any diabetes medication dose changes were made via the CDE Protocol and Collaborative Practice Agreement with the patient's referring provider. A copy of this encounter was shared with the provider.

## 2022-05-16 NOTE — LETTER
5/16/2022        RE: Bucky Franco  [id number]   [place] [address]        Diabetes Self-Management Education & Support    Presents for: Individual review    SUBJECTIVE/OBJECTIVE:  Presents for: Individual review  Accompanied by: Self  Diabetes education in the past 24mo: Yes  Focus of Visit: Assistance w/ making life changes  Diabetes type: Type 2  Date of diagnosis: 2018  Disease course: Stable  How confident are you filling out medical forms by yourself:: Extremely  Diabetes management related comments/concerns: Glucose levels trending up.  Transportation concerns: No  Difficulty affording diabetes medication?: No  Difficulty affording diabetes testing supplies?: No  Other concerns:: None  Cultural Influences/Ethnic Background:  Not  or     Diabetes Symptoms & Complications:  Fatigue: Sometimes  Neuropathy: Sometimes  Polydipsia: No  Polyphagia: No  Polyuria: Yes (On Jardiance)  Visual change: No  Slow healing wounds: No  Symptom course: Stable  Weight trend: Stable (Pt has not lost weight with Ozempic.)  Complications assessed today?: No  CVA: No  Heart disease: No  Peripheral neuropathy: No    Patient Problem List and Family Medical History reviewed for relevant medical history, current medical status, and diabetes risk factors.    Vitals:  /81   Pulse 77   Resp 16   Ht 1.829 m (6')   Wt 137.4 kg (303 lb)   SpO2 97%   BMI 41.09 kg/m    Estimated body mass index is 41.09 kg/m  as calculated from the following:    Height as of this encounter: 1.829 m (6').    Weight as of this encounter: 137.4 kg (303 lb).   Last 3 BP:   BP Readings from Last 3 Encounters:   05/16/22 134/81   02/15/22 (!) 148/80   11/15/21 102/82       History   Smoking Status     Never Smoker   Smokeless Tobacco     Former User     Types: Snuff     Quit date: 10/1/2015       Labs:  Lab Results   Component Value Date    A1C 7.5 05/16/2022     Lab Results   Component Value Date     08/13/2021      10/14/2020     Lab Results   Component Value Date    LDL 55 08/13/2021    LDL 58 07/13/2020     HDL Cholesterol   Date Value Ref Range Status   07/13/2020 35 (L) >39 mg/dL Final     Direct Measure HDL   Date Value Ref Range Status   08/13/2021 37 (L) >=40 mg/dL Final     Comment:     0-19 years:       Greater than or equal to 45 mg/dL   Low: Less than 40 mg/dL   Borderline low: 40-44 mg/dL     20 years and older:   Female: Greater than or equal to 50 mg/dL   Male:   Greater than or equal to 40 mg/dL        ]  GFR Estimate   Date Value Ref Range Status   08/13/2021 >90 >60 mL/min/1.73m2 Final     Comment:     As of July 11, 2021, eGFR is calculated by the CKD-EPI creatinine equation, without race adjustment. eGFR can be influenced by muscle mass, exercise, and diet. The reported eGFR is an estimation only and is only applicable if the renal function is stable.   10/14/2020 >90 >60 mL/min/[1.73_m2] Final     Comment:     Non  GFR Calc  Starting 12/18/2018, serum creatinine based estimated GFR (eGFR) will be   calculated using the Chronic Kidney Disease Epidemiology Collaboration   (CKD-EPI) equation.       GFR Estimate If Black   Date Value Ref Range Status   10/14/2020 >90 >60 mL/min/[1.73_m2] Final     Comment:      GFR Calc  Starting 12/18/2018, serum creatinine based estimated GFR (eGFR) will be   calculated using the Chronic Kidney Disease Epidemiology Collaboration   (CKD-EPI) equation.       Lab Results   Component Value Date    CR 0.70 08/13/2021    CR 0.74 10/14/2020     No results found for: MICROALBUMIN    Healthy Eating:  Healthy Eating Assessed Today: Yes  Cultural/Worship diet restrictions?: No  Meal planning/habits: None, Low carb, Other (Low sodium)  How many times a week on average do you eat food made away from home (restaurant/take-out)?: 2  Meals include: Breakfast, Lunch, Dinner  Breakfast: 3-4 eggs with cheese with ham or turkey - somtimes veggies - water or diet  mountain dew.  Lunch: jambalya with some rice or sandwich (2 each) - diet mountain dew  Dinner: meat  - diet mountain dew or water  Snacks: peanut butter granola bars - 2 each or 3-4 chips a hoy cookies  Beverages: Water, Diet soda, Milk, Coffee  Has patient met with a dietitian in the past?: Yes    Being Active:  Being Active Assessed Today: Yes  Exercise:: Currently not exercising  Barrier to exercise: None    Monitoring:  Monitoring Assessed Today: Yes  Did patient bring glucose meter to appointment? : Yes  Blood Glucose Meter: ContourNext  Times checking blood sugar at home (number): 2  Times checking blood sugar at home (per): Day  Blood glucose trend: Increasing  Fasting-184, 161, 157, 178, 158, 193, 192, 174, 176, 183, 154, 147, 161, 199  Post supper-202, 235, 221, 167, 156, 194    Taking Medications:  Diabetes Medication(s)     Sodium-Glucose Co-Transporter 2 (SGLT2) Inhibitors       empagliflozin (JARDIANCE) 10 MG TABS tablet    Take 1 tablet (10 mg) by mouth daily    Sulfonylureas       glimepiride (AMARYL) 2 MG tablet    Take two tablets ( 4 mg ) by mouth in the morning and one tablet (4 mg) in the evening with meals.    Incretin Mimetic Agents (GLP-1 Receptor Agonists)       OZEMPIC, 1 MG/DOSE, 4 MG/3ML SOPN    INJECT 1 MG SUBCUTANEOUS EVERY 7 DAYS          Taking Medication Assessed Today: Yes  Current Treatments: Oral Medication (taken by mouth), Non-insulin Injectables, Diet (Glimepiride 4 mg am/2 mg evening, Ozempic 1 mg weekly, Jardiance 10 mg daily)  Problems taking diabetes medications regularly?: No  Diabetes medication side effects?: No (Metformin was difficult to swallow and keep down)    Problem Solving:  Problem Solving Assessed Today: Yes  Is the patient at risk for hypoglycemia?: Yes  Hypoglycemia Frequency: Rarely  Hypoglycemia Treatment: Other food  Is the patient at risk for DKA?: No    Hypoglycemia symptoms  Feeling shaky: Yes    Reducing Risks:  Reducing Risks Assessed Today:  No  Diabetes Risks: Family History, Sedentary Lifestyle  CAD Risks: Diabetes Mellitus, Male sex, Obesity, Sedentary lifestyle  Has dilated eye exam at least once a year?: Yes  Sees dentist every 6 months?: Yes  Feet checked by healthcare provider in the last year?: Yes    Healthy Coping:  Healthy Coping Assessed Today: Yes  Emotional response to diabetes: Acceptance, Concern for health and well-being  Stage of change: MAINTENANCE (Working to maintain change, with risk of relapse)  Patient Activation Measure Survey Score:  TED Score (Last Two) 10/11/2018   TED Raw Score 30   Activation Score 56   TED Level 3     Diabetes knowledge and skills assessment:   Patient is knowledgeable in diabetes management concepts related to: Healthy Eating, Being Active, Monitoring, Taking Medication, Problem Solving, Reducing Risks and Healthy Coping    Patient needs further education on the following diabetes management concepts: Pt is knowledgeable.     Based on learning assessment above, most appropriate setting for further diabetes education would be: Individual setting.      INTERVENTIONS:    Education provided today on:  AADE Self-Care Behaviors:  Healthy Eating: Encouraged 60 grams carbohydrate/meal, 15-30 grams/snack.   Being Active: relationship to blood glucose  Taking Medication: Dose adjustment medications indicated.  Discussed increase of Ozempic or Jardiance.    Reducing Risks: A1C - goals, relating to blood glucose levels, how often to check    Opportunities for ongoing education and support in diabetes-self management were discussed.    Pt verbalized understanding of concepts discussed and recommendations provided today.       Education Materials Provided:  No new materials today.     ASSESSMENT:  A1c today was 7.5% which is up from 6.6% at last check.  Pt feels glucose levels have trended up with change from Metformin to Jardiance.  He states he has not changed diet or exercise routine.  Desires weight loss but  struggles.  After discussion desires increase dose of Ozempic vs Jardiance.  Hopefully will help with weight.     Patient's most recent   Lab Results   Component Value Date    A1C 7.5 05/16/2022    is not meeting goal of <7.0    PLAN  Limit carbohydrates to 60 grams/meal and 15-30 grams/snack.  Try to get some daily exercise.   Test glucose 2x/day.  Keep taking current dose of Glimepiride and Jardiance.   Increase Ozempic to 2 mg weekly.   See Patient Instructions for co-developed, patient-stated behavior change goals.  AVS printed and provided to patient today.   Follow up June 27th at 8:30 am.     Time Spent: 30 minutes  Encounter Type: Individual    Any diabetes medication dose changes were made via the CDE Protocol and Collaborative Practice Agreement with the patient's referring provider. A copy of this encounter was shared with the provider.          Sincerely,        Harmony Bird RD

## 2022-05-16 NOTE — PATIENT INSTRUCTIONS
-Continue to work on healthy, low carbohydrate meal plan -meals =60 grams carbohydrate, snacks = 15-30 grams.   -Try to get some activity daily.  -Keep testing 2x/day - fasting and 2 hours after supper as able.  -Target levels are fasting , 2 hours after meals < 180.  -A1c today was 7.5%.  Goal is < 7.0%.   -Continue current dose of Jardiance and Glimepiride.    -Increase Ozempic to 2 mg weekly.  -Follow up June 27th at 8:30 am.  Bring your meter.  -MICHAEL Aviles, [facility] [phone].

## 2022-08-09 NOTE — PROGRESS NOTES
Assessment & Plan     Type 2 diabetes mellitus without complication, without long-term current use of insulin (H)  A1c (8/15/2022): 6.8 improved from 7.5  - Hemoglobin A1c  - Basic metabolic panel  - Albumin Random Urine Quantitative with Creat Ratio  - c/w glimepiride (AMARYL) 2 MG tablet; Take two tablets ( 4 mg ) by mouth in the morning and one tablet (2 mg) in the evening with meals.  - Z FOOT EXAM  NO CHARGE  - c/w ozempic 2mg  - c/w jardiance 10mg   - will schedule follow up with diabetic education   - no change in medications today d/t A1c of 6.8    Mixed hyperlipidemia  LDL (8/15/2022): 56  - Lipid Profile (Chol, Trig, HDL, LDL calc)  - c/w crestor 10mg  - c/w ASA 81mg    Benign essential hypertension  BP at goal  - c/w diltiazem 360mg  - c/w lisinopril 40mg  - c/w metoprolol succinate 12.5mg         See Patient Instructions    Return in about 3 months (around 11/15/2022) for Lab Work, Diabetic Visit.    Naomi De Santiago MD  [facility] - BRITTANY Lawler is a 47 year old, presenting for the following health issues:  Diabetes, Hypertension, and Lipids      HPI     Diabetes Follow-up    How often are you checking your blood sugar? Two times daily  Blood sugar testing frequency justification:  Patient modifying lifestyle changes (diet, exercise) with blood sugars  What time of day are you checking your blood sugars (select all that apply)?  Before and after meals  Have you had any blood sugars above 200?  Yes  Have you had any blood sugars below 70?  No    What symptoms do you notice when your blood sugar is low?  None    What concerns do you have today about your diabetes? Blood sugar is often over 200 and Other: A1C is going up     Do you have any of these symptoms? (Select all that apply)  Numbness in feet    - glimepiride 4mg and 2mg evening   - ozempic 2mg, no issues   - jardiance 10mg   - B to 160 morning. 140 2hrs after eating, 160s at night    Diabetic Foot  "Screen:   Any complaints of increased pain or numbness ?  YES feet are numb, chronic issue  Is there a foot ulcer now or a history of foot ulcer? No  Does the foot have an abnormal shape? No  Are the nails thick, too long or ingrown?  YES fungal right great toe. Toenails are long  Are there any redness or open areas? No         Sensation Testing done at all points on the diagram with monofilament     Right Foot: Sensation sensed at 7 &8  Left Foot: Sensation Sensed at 7&8, 2  Risk Category: 1- Loss of protective sensation with normal appearing foot (no weakness, deformity, callous, pre-ulcer or h/o ulceration)  Performed by Naomi De Santiago MD      Hyperlipidemia Follow-Up      Are you regularly taking any medication or supplement to lower your cholesterol?   Yes- Rosuvastatin 10mg    Are you having muscle aches or other side effects that you think could be caused by your cholesterol lowering medication?  No  - fasting     Hypertension Follow-up      Do you check your blood pressure regularly outside of the clinic? No     Are you following a low salt diet? Yes    Are your blood pressures ever more than 140 on the top number (systolic) OR more   than 90 on the bottom number (diastolic), for example 140/90? No    BP Readings from Last 2 Encounters:   08/15/22 (!) 140/80   05/16/22 134/81     Hemoglobin A1C (%)   Date Value   05/16/2022 7.5 (A)   02/15/2022 6.6 (H)   11/15/2021 6.9 (A)     LDL Cholesterol Calculated (mg/dL)   Date Value   08/13/2021 55   07/13/2020 58   01/10/2019 50       # colon cancer screening: no FHx  - no h/o colonoscopy   - would be okay for colonoscopy     # Mood  - going through changes d/t boss retiring  - things are not \"friendly\" at work       Review of Systems   Constitutional: Negative for chills and fever.   HENT: Negative for congestion.    Respiratory: Negative for shortness of breath and wheezing.    Cardiovascular: Negative for chest pain and palpitations.   Gastrointestinal: " Negative for abdominal pain, constipation, diarrhea and hematochezia.   Psychiatric/Behavioral: Positive for dysphoric mood.            Objective    BP (!) 140/80   Pulse 62   Temp 97.6  F (36.4  C) (Tympanic)   Resp 17   Wt 135.6 kg (299 lb)   SpO2 97%   BMI 40.55 kg/m    Body mass index is 40.55 kg/m .  Physical Exam  Constitutional:       General: He is not in acute distress.     Appearance: He is not ill-appearing.   Cardiovascular:      Rate and Rhythm: Normal rate and regular rhythm.      Pulses: Normal pulses.           Dorsalis pedis pulses are 2+ on the right side and 2+ on the left side.      Heart sounds: No murmur heard.  Pulmonary:      Effort: Pulmonary effort is normal. No respiratory distress.      Breath sounds: No wheezing or rales.   Feet:      Right foot:      Protective Sensation: 10 sites tested. 2 sites sensed.      Skin integrity: Skin integrity normal.      Toenail Condition: Right toenails are long. Fungal disease present.     Left foot:      Protective Sensation: 10 sites tested. 3 sites sensed.      Skin integrity: Skin integrity normal.      Toenail Condition: Left toenails are long.   Neurological:      Mental Status: He is alert.        Results for orders placed or performed in visit on 08/15/22 (from the past 24 hour(s))   Lipid Profile (Chol, Trig, HDL, LDL calc)   Result Value Ref Range    Cholesterol 119 <200 mg/dL    Triglycerides 133 <150 mg/dL    Direct Measure HDL 36 (L) >=40 mg/dL    LDL Cholesterol Calculated 56 <=100 mg/dL    Non HDL Cholesterol 83 <130 mg/dL    Patient Fasting > 8hrs? Yes     Narrative    Cholesterol  Desirable:  <200 mg/dL    Triglycerides  Normal:  Less than 150 mg/dL  Borderline High:  150-199 mg/dL  High:  200-499 mg/dL  Very High:  Greater than or equal to 500 mg/dL    Direct Measure HDL  Female:  Greater than or equal to 50 mg/dL   Male:  Greater than or equal to 40 mg/dL    LDL Cholesterol  Desirable:  <100mg/dL  Above Desirable:  100-129 mg/dL    Borderline High:  130-159 mg/dL   High:  160-189 mg/dL   Very High:  >= 190 mg/dL    Non HDL Cholesterol  Desirable:  130 mg/dL  Above Desirable:  130-159 mg/dL  Borderline High:  160-189 mg/dL  High:  190-219 mg/dL  Very High:  Greater than or equal to 220 mg/dL   Hemoglobin A1c   Result Value Ref Range    Estimated Average Glucose 148 mg/dL    Hemoglobin A1C 6.8 (H) 0.0 - 5.6 %   Basic metabolic panel   Result Value Ref Range    Sodium 137 133 - 144 mmol/L    Potassium 3.8 3.4 - 5.3 mmol/L    Chloride 106 94 - 109 mmol/L    Carbon Dioxide (CO2) 27 20 - 32 mmol/L    Anion Gap 4 3 - 14 mmol/L    Urea Nitrogen 18 7 - 30 mg/dL    Creatinine 0.83 0.66 - 1.25 mg/dL    Calcium 8.9 8.5 - 10.1 mg/dL    Glucose 151 (H) 70 - 99 mg/dL    GFR Estimate >90 >60 mL/min/1.73m2   Albumin Random Urine Quantitative with Creat Ratio   Result Value Ref Range    Creatinine Urine mg/dL 153 mg/dL    Albumin Urine mg/L 30 mg/L    Albumin Urine mg/g Cr 19.61 (H) 0.00 - 17.00 mg/g Cr                   .  ..

## 2022-08-15 ENCOUNTER — TELEPHONE (OUTPATIENT)
Dept: FAMILY MEDICINE | Facility: OTHER | Age: 47
End: 2022-08-15

## 2022-08-15 ENCOUNTER — OFFICE VISIT (OUTPATIENT)
Dept: FAMILY MEDICINE | Facility: OTHER | Age: 47
End: 2022-08-15
Attending: FAMILY MEDICINE
Payer: COMMERCIAL

## 2022-08-15 ENCOUNTER — APPOINTMENT (OUTPATIENT)
Dept: LAB | Facility: OTHER | Age: 47
End: 2022-08-15
Attending: FAMILY MEDICINE
Payer: COMMERCIAL

## 2022-08-15 VITALS
OXYGEN SATURATION: 97 % | BODY MASS INDEX: 40.55 KG/M2 | WEIGHT: 299 LBS | SYSTOLIC BLOOD PRESSURE: 140 MMHG | TEMPERATURE: 97.6 F | HEART RATE: 62 BPM | RESPIRATION RATE: 17 BRPM | DIASTOLIC BLOOD PRESSURE: 80 MMHG

## 2022-08-15 DIAGNOSIS — E78.2 MIXED HYPERLIPIDEMIA: ICD-10-CM

## 2022-08-15 DIAGNOSIS — I10 BENIGN ESSENTIAL HYPERTENSION: ICD-10-CM

## 2022-08-15 DIAGNOSIS — E11.9 TYPE 2 DIABETES MELLITUS WITHOUT COMPLICATION, WITHOUT LONG-TERM CURRENT USE OF INSULIN (H): Primary | ICD-10-CM

## 2022-08-15 LAB
ANION GAP SERPL CALCULATED.3IONS-SCNC: 4 MMOL/L (ref 3–14)
BUN SERPL-MCNC: 18 MG/DL (ref 7–30)
CALCIUM SERPL-MCNC: 8.9 MG/DL (ref 8.5–10.1)
CHLORIDE BLD-SCNC: 106 MMOL/L (ref 94–109)
CHOLEST SERPL-MCNC: 119 MG/DL
CO2 SERPL-SCNC: 27 MMOL/L (ref 20–32)
CREAT SERPL-MCNC: 0.83 MG/DL (ref 0.66–1.25)
CREAT UR-MCNC: 153 MG/DL
EST. AVERAGE GLUCOSE BLD GHB EST-MCNC: 148 MG/DL
FASTING STATUS PATIENT QL REPORTED: YES
GFR SERPL CREATININE-BSD FRML MDRD: >90 ML/MIN/1.73M2
GLUCOSE BLD-MCNC: 151 MG/DL (ref 70–99)
HBA1C MFR BLD: 6.8 % (ref 0–5.6)
HDLC SERPL-MCNC: 36 MG/DL
LDLC SERPL CALC-MCNC: 56 MG/DL
MICROALBUMIN UR-MCNC: 30 MG/L
MICROALBUMIN/CREAT UR: 19.61 MG/G CR (ref 0–17)
NONHDLC SERPL-MCNC: 83 MG/DL
POTASSIUM BLD-SCNC: 3.8 MMOL/L (ref 3.4–5.3)
SODIUM SERPL-SCNC: 137 MMOL/L (ref 133–144)
TRIGL SERPL-MCNC: 133 MG/DL

## 2022-08-15 PROCEDURE — 36415 COLL VENOUS BLD VENIPUNCTURE: CPT | Performed by: FAMILY MEDICINE

## 2022-08-15 PROCEDURE — 99214 OFFICE O/P EST MOD 30 MIN: CPT | Performed by: FAMILY MEDICINE

## 2022-08-15 PROCEDURE — 83036 HEMOGLOBIN GLYCOSYLATED A1C: CPT | Performed by: FAMILY MEDICINE

## 2022-08-15 PROCEDURE — 80048 BASIC METABOLIC PNL TOTAL CA: CPT | Performed by: FAMILY MEDICINE

## 2022-08-15 PROCEDURE — 82043 UR ALBUMIN QUANTITATIVE: CPT | Performed by: FAMILY MEDICINE

## 2022-08-15 PROCEDURE — 80061 LIPID PANEL: CPT | Performed by: FAMILY MEDICINE

## 2022-08-15 RX ORDER — GLIMEPIRIDE 2 MG/1
TABLET ORAL
Qty: 360 TABLET | Refills: 3
Start: 2022-08-15 | End: 2022-08-29

## 2022-08-15 ASSESSMENT — ENCOUNTER SYMPTOMS
CHILLS: 0
PALPITATIONS: 0
DIARRHEA: 0
SHORTNESS OF BREATH: 0
FEVER: 0
DYSPHORIC MOOD: 1
ABDOMINAL PAIN: 0
WHEEZING: 0
CONSTIPATION: 0
HEMATOCHEZIA: 0

## 2022-08-15 NOTE — NURSING NOTE
Chief Complaint   Patient presents with     Diabetes     Hypertension     Lipids       Initial BP (!) 140/80   Pulse 62   Temp 97.6  F (36.4  C) (Tympanic)   Resp 17   Wt 135.6 kg (299 lb)   SpO2 97%   BMI 40.55 kg/m   Estimated body mass index is 40.55 kg/m  as calculated from the following:    Height as of 5/16/22: 1.829 m (6').    Weight as of this encounter: 135.6 kg (299 lb).  Medication Reconciliation: complete  Emmanuelle Del Castillo LPN

## 2022-08-15 NOTE — TELEPHONE ENCOUNTER
See below;lvm for pt to call back to schedule appt with dr grider in 3 months, then please schedule pt next available that he is able with diabetic ed with katy escalante For BG review

## 2022-08-15 NOTE — TELEPHONE ENCOUNTER
----- Message from Naomi De Santiago MD sent at 8/15/2022  8:05 AM CDT -----  Please put back on Harmony's schedule  Me, 3 months, 7:30am appt    Thanks!

## 2022-08-29 DIAGNOSIS — E11.9 TYPE 2 DIABETES MELLITUS WITHOUT COMPLICATION, WITHOUT LONG-TERM CURRENT USE OF INSULIN (H): ICD-10-CM

## 2022-08-29 RX ORDER — GLIMEPIRIDE 2 MG/1
TABLET ORAL
Qty: 270 TABLET | Refills: 0 | Status: SHIPPED | OUTPATIENT
Start: 2022-08-29 | End: 2022-12-02

## 2022-11-14 NOTE — PROGRESS NOTES
Assessment & Plan     Type 2 diabetes mellitus without complication, without long-term current use of insulin (H)  A1c of 7 which is technically at goal, but no lows, so will increase Jardiance. Recheck BMP in one month, lab order placed.   - Hemoglobin A1c  - c/w glimepiride 4mg qAM and 2mg qPM  - increase jardiance to 25mg, Rx sent  - c/w ozempic 2mg     Mixed hyperlipidemia  LDL (8/15/2022): 56  - c/w rosuvastatin 10mg     Benign essential hypertension  BP above goal on first set of vitals. Repeat at goal. Will increase metoprolol d/t multiple BP readings above goal. HR in 60s and tolerating. Discussed if he has any lightheadness / dizziness on the increase of metoprolol  to call the clinic  - Adult Sleep Eval & Management  Referral; Future  - increase metoprolol succinate ER (TOPROL XL) 25 MG 24 hr tablet; Take 1 tablet (25 mg) by mouth daily  - c/w lisinopril 40mg  - c/w diltiazem 360mg   - intolerant of hydrochlorothiazide, so reluctant to try chlorthalidone  - nurse only visit in two weeks for bp and hr recheck     Gastroesophageal reflux disease, unspecified whether esophagitis present  Many year h/o using OTC omeprazole. Heartburn is   - omeprazole (PRILOSEC) 20 MG DR capsule; Take 1 capsule (20 mg) by mouth daily Take 1/2 hour before your first meal of the day    Morbid obesity (H)  Sleep study         See Patient Instructions    Return in about 3 months (around 2/15/2023) for Diabetic Visit, Lab Work.    Naomi De Santiago MD  [facility] - BRITTANY Lawler is a 47 year old, presenting for the following health issues:  Diabetes, Hypertension, and Lipids      HPI     Diabetes Follow-up    How often are you checking your blood sugar? Two times daily  Blood sugar testing frequency justification:  Uncontrolled diabetes  What time of day are you checking your blood sugars (select all that apply)?  Before and after meals  Have you had any blood sugars above 200?  Yes   Have  "you had any blood sugars below 70?  No    What symptoms do you notice when your blood sugar is low?  None    What concerns do you have today about your diabetes? None     Do you have any of these symptoms? (Select all that apply)  Numbness in feet    Have you had a diabetic eye exam in the last 12 months? No - has one scheduled for 12/19/2022  - BG are \"bouncing\"  - more readings over 200s  - diet has not changed  - no low BG    Hyperlipidemia Follow-Up      Are you regularly taking any medication or supplement to lower your cholesterol?   Yes- Rosuvastatin 10 mg    Are you having muscle aches or other side effects that you think could be caused by your cholesterol lowering medication?  No    Hypertension Follow-up      Do you check your blood pressure regularly outside of the clinic? No     Are you following a low salt diet? Yes    Are your blood pressures ever more than 140 on the top number (systolic) OR more   than 90 on the bottom number (diastolic), for example 140/90? Yes    - home blood pressures SBP 140s  - cardizem 360mg   - lisinopril 40mg   - metoprolol succinate 12.5mg     - h/o hydrochlorothiazide and possible pancreatitis. Quick reaction to medication       BP Readings from Last 2 Encounters:   11/15/22 (!) 154/82   08/15/22 (!) 140/80     Hemoglobin A1C (%)   Date Value   08/15/2022 6.8 (H)   05/16/2022 7.5 (A)   02/15/2022 6.6 (H)   11/15/2021 6.9 (A)     LDL Cholesterol Calculated (mg/dL)   Date Value   08/15/2022 56   08/13/2021 55   07/13/2020 58   01/10/2019 50       # GERD: many year history  - no h/o EGD  - controlled OTC prilosec    # Wellness:  - colon cancer screening - Bucky will talk with his insurance company  - immunization: COVID - updating today    Review of Systems   Constitutional: Negative for chills and fever.   HENT: Negative for congestion.    Respiratory: Negative for shortness of breath and wheezing.    Cardiovascular: Negative for chest pain and palpitations. "   Gastrointestinal: Negative for abdominal pain and heartburn (controlled).   Skin:        No foot issues   Psychiatric/Behavioral: Negative for mood changes.          Objective    BP (!) 154/82   Pulse 67   Temp 97.7  F (36.5  C) (Tympanic)   Resp 18   Wt 132.9 kg (293 lb)   SpO2 96%   BMI 39.74 kg/m    Body mass index is 39.74 kg/m .  Physical Exam  Constitutional:       General: He is not in acute distress.     Appearance: He is not ill-appearing.   Cardiovascular:      Rate and Rhythm: Normal rate and regular rhythm.      Pulses: Normal pulses.      Heart sounds: No murmur heard.  Pulmonary:      Effort: Pulmonary effort is normal. No respiratory distress.      Breath sounds: No wheezing or rales.   Neurological:      Mental Status: He is alert.   Psychiatric:         Mood and Affect: Mood normal.

## 2022-11-15 ENCOUNTER — OFFICE VISIT (OUTPATIENT)
Dept: FAMILY MEDICINE | Facility: OTHER | Age: 47
End: 2022-11-15
Attending: FAMILY MEDICINE
Payer: COMMERCIAL

## 2022-11-15 VITALS
HEART RATE: 62 BPM | SYSTOLIC BLOOD PRESSURE: 133 MMHG | RESPIRATION RATE: 18 BRPM | WEIGHT: 293 LBS | DIASTOLIC BLOOD PRESSURE: 78 MMHG | OXYGEN SATURATION: 96 % | TEMPERATURE: 97.7 F | BODY MASS INDEX: 39.74 KG/M2

## 2022-11-15 DIAGNOSIS — Z23 HIGH PRIORITY FOR 2019-NCOV VACCINE: ICD-10-CM

## 2022-11-15 DIAGNOSIS — I10 BENIGN ESSENTIAL HYPERTENSION: ICD-10-CM

## 2022-11-15 DIAGNOSIS — E66.01 MORBID OBESITY (H): ICD-10-CM

## 2022-11-15 DIAGNOSIS — E78.2 MIXED HYPERLIPIDEMIA: ICD-10-CM

## 2022-11-15 DIAGNOSIS — E11.9 TYPE 2 DIABETES MELLITUS WITHOUT COMPLICATION, WITHOUT LONG-TERM CURRENT USE OF INSULIN (H): Primary | ICD-10-CM

## 2022-11-15 DIAGNOSIS — K21.9 GASTROESOPHAGEAL REFLUX DISEASE, UNSPECIFIED WHETHER ESOPHAGITIS PRESENT: ICD-10-CM

## 2022-11-15 LAB
ANION GAP SERPL CALCULATED.3IONS-SCNC: 10 MMOL/L (ref 7–15)
BUN SERPL-MCNC: 13.2 MG/DL (ref 6–20)
CALCIUM SERPL-MCNC: 9 MG/DL (ref 8.6–10)
CHLORIDE SERPL-SCNC: 101 MMOL/L (ref 98–107)
CREAT SERPL-MCNC: 0.91 MG/DL (ref 0.67–1.17)
DEPRECATED HCO3 PLAS-SCNC: 26 MMOL/L (ref 22–29)
EST. AVERAGE GLUCOSE BLD GHB EST-MCNC: 154 MG/DL
GFR SERPL CREATININE-BSD FRML MDRD: >90 ML/MIN/1.73M2
GLUCOSE SERPL-MCNC: 153 MG/DL (ref 70–99)
HBA1C MFR BLD: 7 %
POTASSIUM SERPL-SCNC: 3.8 MMOL/L (ref 3.4–5.3)
SODIUM SERPL-SCNC: 137 MMOL/L (ref 136–145)

## 2022-11-15 PROCEDURE — 80048 BASIC METABOLIC PNL TOTAL CA: CPT | Performed by: FAMILY MEDICINE

## 2022-11-15 PROCEDURE — 99214 OFFICE O/P EST MOD 30 MIN: CPT | Mod: 25 | Performed by: FAMILY MEDICINE

## 2022-11-15 PROCEDURE — 91312 COVID-19,PF,PFIZER BOOSTER BIVALENT: CPT | Performed by: FAMILY MEDICINE

## 2022-11-15 PROCEDURE — 0124A COVID-19,PF,PFIZER BOOSTER BIVALENT: CPT | Performed by: FAMILY MEDICINE

## 2022-11-15 PROCEDURE — 83036 HEMOGLOBIN GLYCOSYLATED A1C: CPT | Performed by: FAMILY MEDICINE

## 2022-11-15 PROCEDURE — 36415 COLL VENOUS BLD VENIPUNCTURE: CPT | Performed by: FAMILY MEDICINE

## 2022-11-15 RX ORDER — METOPROLOL SUCCINATE 25 MG/1
25 TABLET, EXTENDED RELEASE ORAL DAILY
Qty: 90 TABLET | Refills: 3 | Status: SHIPPED | OUTPATIENT
Start: 2022-11-15 | End: 2023-11-06

## 2022-11-15 ASSESSMENT — ENCOUNTER SYMPTOMS
PALPITATIONS: 0
WHEEZING: 0
CHILLS: 0
FEVER: 0
SHORTNESS OF BREATH: 0
HEARTBURN: 0
ABDOMINAL PAIN: 0

## 2022-11-15 NOTE — PATIENT INSTRUCTIONS
Increase your metoprolol to 25mg (full tablet). Please call if you get lighthead / dizzy  Nurse only visit in two weeks for blood pressure and heart rate check    We put in a referral to sleep medicine    Please check with your insurance company regarding options for colon cancer screening.

## 2022-11-15 NOTE — NURSING NOTE
Chief Complaint   Patient presents with     Diabetes     Hypertension     Lipids       Initial BP (!) 154/82   Pulse 67   Temp 97.7  F (36.5  C) (Tympanic)   Resp 18   Wt 132.9 kg (293 lb)   SpO2 96%   BMI 39.74 kg/m   Estimated body mass index is 39.74 kg/m  as calculated from the following:    Height as of 5/16/22: 1.829 m (6').    Weight as of this encounter: 132.9 kg (293 lb).  Medication Reconciliation: complete  Emmanuelle Del Castillo LPN

## 2022-11-20 ENCOUNTER — HEALTH MAINTENANCE LETTER (OUTPATIENT)
Age: 47
End: 2022-11-20

## 2022-11-21 ENCOUNTER — TELEPHONE (OUTPATIENT)
Dept: EDUCATION SERVICES | Facility: OTHER | Age: 47
End: 2022-11-21

## 2022-11-21 DIAGNOSIS — E11.65 TYPE 2 DIABETES MELLITUS WITH HYPERGLYCEMIA, WITHOUT LONG-TERM CURRENT USE OF INSULIN (H): Primary | ICD-10-CM

## 2022-11-22 DIAGNOSIS — E11.65 TYPE 2 DIABETES MELLITUS WITH HYPERGLYCEMIA, WITHOUT LONG-TERM CURRENT USE OF INSULIN (H): ICD-10-CM

## 2022-11-23 RX ORDER — SEMAGLUTIDE 2.68 MG/ML
INJECTION, SOLUTION SUBCUTANEOUS
Qty: 3 ML | Refills: 3 | Status: SHIPPED | OUTPATIENT
Start: 2022-11-23 | End: 2023-03-16

## 2022-11-30 ENCOUNTER — HOSPITAL ENCOUNTER (OUTPATIENT)
Dept: EDUCATION SERVICES | Facility: HOSPITAL | Age: 47
Discharge: HOME | End: 2022-11-30
Attending: DIETITIAN, REGISTERED | Admitting: FAMILY MEDICINE
Payer: COMMERCIAL

## 2022-11-30 ENCOUNTER — TELEPHONE (OUTPATIENT)
Dept: EDUCATION SERVICES | Facility: HOSPITAL | Age: 47
End: 2022-11-30

## 2022-11-30 VITALS
HEART RATE: 68 BPM | WEIGHT: 293.1 LBS | BODY MASS INDEX: 39.7 KG/M2 | HEIGHT: 72 IN | OXYGEN SATURATION: 98 % | DIASTOLIC BLOOD PRESSURE: 79 MMHG | SYSTOLIC BLOOD PRESSURE: 137 MMHG

## 2022-11-30 DIAGNOSIS — E11.65 TYPE 2 DIABETES MELLITUS WITH HYPERGLYCEMIA, WITHOUT LONG-TERM CURRENT USE OF INSULIN (H): ICD-10-CM

## 2022-11-30 DIAGNOSIS — E11.65 TYPE 2 DIABETES MELLITUS WITH HYPERGLYCEMIA, WITHOUT LONG-TERM CURRENT USE OF INSULIN (H): Primary | ICD-10-CM

## 2022-11-30 DIAGNOSIS — E11.9 TYPE 2 DIABETES MELLITUS WITHOUT COMPLICATION, WITHOUT LONG-TERM CURRENT USE OF INSULIN (H): Primary | ICD-10-CM

## 2022-11-30 PROCEDURE — 95250 CONT GLUC MNTR PHYS/QHP EQP: CPT | Performed by: DIETITIAN, REGISTERED

## 2022-11-30 PROCEDURE — G0108 DIAB MANAGE TRN  PER INDIV: HCPCS | Performed by: DIETITIAN, REGISTERED

## 2022-11-30 ASSESSMENT — PATIENT HEALTH QUESTIONNAIRE - PHQ9
SUM OF ALL RESPONSES TO PHQ QUESTIONS 1-9: 2
5. POOR APPETITE OR OVEREATING: NOT AT ALL

## 2022-11-30 ASSESSMENT — ANXIETY QUESTIONNAIRES
IF YOU CHECKED OFF ANY PROBLEMS ON THIS QUESTIONNAIRE, HOW DIFFICULT HAVE THESE PROBLEMS MADE IT FOR YOU TO DO YOUR WORK, TAKE CARE OF THINGS AT HOME, OR GET ALONG WITH OTHER PEOPLE: NOT DIFFICULT AT ALL
5. BEING SO RESTLESS THAT IT IS HARD TO SIT STILL: NOT AT ALL
2. NOT BEING ABLE TO STOP OR CONTROL WORRYING: NOT AT ALL
7. FEELING AFRAID AS IF SOMETHING AWFUL MIGHT HAPPEN: NOT AT ALL
GAD7 TOTAL SCORE: 0
GAD7 TOTAL SCORE: 0
6. BECOMING EASILY ANNOYED OR IRRITABLE: NOT AT ALL
3. WORRYING TOO MUCH ABOUT DIFFERENT THINGS: NOT AT ALL
1. FEELING NERVOUS, ANXIOUS, OR ON EDGE: NOT AT ALL

## 2022-11-30 ASSESSMENT — PAIN SCALES - GENERAL: PAINLEVEL: NO PAIN (0)

## 2022-11-30 NOTE — LETTER
11/30/2022        RE: Bucky Franco  [address]  [place] [address]        Diabetes Self-Management Education & Support    Presents for: Individual review    Type of Service: In Person Visit    ASSESSMENT:  Recent A1c 7.0%.  Pt had Ozempic increased to 2 mg May 2022 and Jardiance was increased to 25 mg 11/15/22.  Glucose levels still do not seem to be trending to target.  Fasting levels on download today 158-179, post supper 170-267.  Pt is agreeable to CGM study to better assess.      Patient's most recent   Lab Results   Component Value Date    A1C 7.0 11/15/2022    A1C 7.5 05/16/2022     is not meeting goal of <7.0    Diabetes knowledge and skills assessment:   Patient is knowledgeable in diabetes management concepts related to: Healthy Eating, Monitoring and Taking Medication    Based on learning assessment above, most appropriate setting for further diabetes education would be: Individual setting.      PLAN:  Sensor agreement signed.    Sensor attached to left arm. No redness, drainage or bleeding noted. Patient instructed on testing schedule, how to complete the patient log, restrictions during wear and to remove if having an x-ray, MRI or CT.    Patient return date requested from December 20th - 9:30 am.    Freestyle Yonis Sensor:  Lot#:[id number]  Expiration Date: 02/28/2023  Sensor S/N: [id number]    Patient's identity was verified by using patient's name and date of birth prior to insertion.    Sensor started: 9:10 am  2 minute re-check completed: 9:12 am    Written instructions and patient log given to patient.    Topics to cover at upcoming visits: CGM evaluation with Thuy Pandya NP.     Follow-up: December 20th - 9:30 am.    See Care Plan for co-developed, patient-state behavior change goals.  AVS provided for patient today.    Education Materials Provided:  Food Logs     SUBJECTIVE/OBJECTIVE:  Presents for: Individual review  Accompanied by: Self  Diabetes education in the past 24mo:  Yes  Focus of Visit: Assistance w/ making life changes  Diabetes type: Type 2  Date of diagnosis: 2018  Disease course: Getting harder to manage  How confident are you filling out medical forms by yourself:: Extremely  Diabetes management related comments/concerns: Glucose levels seem to be trending up even with recent increase in Jardiance dose.  Transportation concerns: No  Difficulty affording diabetes medication?: No  Difficulty affording diabetes testing supplies?: No  Other concerns:: None  Cultural Influences/Ethnic Background:  Not  or     Diabetes Symptoms & Complications:  Fatigue: Sometimes  Neuropathy: Sometimes  Polydipsia: No  Polyphagia: No  Polyuria: Sometimes  Visual change: No  Slow healing wounds: No  Symptom course: Stable  Weight trend: Decreasing (Weight is down 10# since last visit to [facility] May 2022, down 14# x 1 year.)  Complications assessed today?: Yes  Autonomic neuropathy: No  CVA: No  Heart disease: No  Nephropathy: No  Peripheral neuropathy: Other  Peripheral Vascular Disease: No  Retinopathy: No  Sexual dysfunction: No    Patient Problem List and Family Medical History reviewed for relevant medical history, current medical status, and diabetes risk factors.    Vitals:  /79 (BP Location: Right arm, Patient Position: Sitting, Cuff Size: Adult Large)   Pulse 68   Ht 1.829 m (6')   Wt 132.9 kg (293 lb 1.6 oz)   SpO2 98%   BMI 39.75 kg/m    Estimated body mass index is 39.75 kg/m  as calculated from the following:    Height as of this encounter: 1.829 m (6').    Weight as of this encounter: 132.9 kg (293 lb 1.6 oz).   Last 3 BP:   BP Readings from Last 3 Encounters:   11/30/22 137/79   11/15/22 133/78   08/15/22 (!) 140/80       History   Smoking Status     Never   Smokeless Tobacco     Former     Types: Snuff     Quit date: 10/1/2015       Labs:  Lab Results   Component Value Date    A1C 7.0 11/15/2022    A1C 7.5 05/16/2022     Lab Results   Component Value Date      11/15/2022     08/15/2022     10/14/2020     Lab Results   Component Value Date    LDL 56 08/15/2022    LDL 58 07/13/2020     HDL Cholesterol   Date Value Ref Range Status   07/13/2020 35 (L) >39 mg/dL Final     Direct Measure HDL   Date Value Ref Range Status   08/15/2022 36 (L) >=40 mg/dL Final   ]  GFR Estimate   Date Value Ref Range Status   11/15/2022 >90 >60 mL/min/1.73m2 Final     Comment:     Effective December 21, 2021 eGFRcr in adults is calculated using the 2021 CKD-EPI creatinine equation which includes age and gender (Reece et al., NEJ, DOI: 10.1056/QBJIim1435945)   10/14/2020 >90 >60 mL/min/[1.73_m2] Final     Comment:     Non  GFR Calc  Starting 12/18/2018, serum creatinine based estimated GFR (eGFR) will be   calculated using the Chronic Kidney Disease Epidemiology Collaboration   (CKD-EPI) equation.       GFR Estimate If Black   Date Value Ref Range Status   10/14/2020 >90 >60 mL/min/[1.73_m2] Final     Comment:      GFR Calc  Starting 12/18/2018, serum creatinine based estimated GFR (eGFR) will be   calculated using the Chronic Kidney Disease Epidemiology Collaboration   (CKD-EPI) equation.       Lab Results   Component Value Date    CR 0.91 11/15/2022    CR 0.74 10/14/2020     No results found for: MICROALBUMIN    Healthy Eating:  Healthy Eating Assessed Today: Yes  Cultural/Gnosticist diet restrictions?: No  Meal planning/habits: Avoiding sweets, Low carb, Low salt, Other  How many times a week on average do you eat food made away from home (restaurant/take-out)?: 1  Meals include: Breakfast, Lunch, Dinner, Afternoon Snack  Breakfast: 3-4 eggs and 2-3 slices bob or 1 sausage  Lunch: Hamburger on bun or 2/3 ring bologna or pasta  Dinner: 4 eggs, 2 toast or frozen pizza 1/2  Snacks: Afternoon- handful pringles  Beverages: Water, Milk, Diet soda, Alcohol, Other ([employer] occasionally, light beer occasionally)  Has patient met with a  dietitian in the past?: Yes    Being Active:  Being Active Assessed Today: Yes  Exercise:: Currently not exercising  Barrier to exercise: None    Monitoring:  Monitoring Assessed Today: Yes  Did patient bring glucose meter to appointment? : Yes  Blood Glucose Meter: ContourNext  Times checking blood sugar at home (number): 2  Times checking blood sugar at home (per): Day  Blood glucose trend: Fluctuating  See above note    Taking Medications:  Diabetes Medication(s)     Sodium-Glucose Co-Transporter 2 (SGLT2) Inhibitors       empagliflozin (JARDIANCE) 25 MG TABS tablet    Take 1 tablet (25 mg) by mouth daily    Sulfonylureas       glimepiride (AMARYL) 2 MG tablet    TAKE 2 TABLETS BY MOUTH IN THEMORNING AND 1 TABLET IN THE EVENING WITH MEALS    Incretin Mimetic Agents       OZEMPIC, 2 MG/DOSE, 8 MG/3ML SOPN    INJECT 2MG SUBCUTANEOUSLY ONCE WEEKLY        Taking Medication Assessed Today: Yes  Current Treatments: Non-insulin Injectables, Oral Medication (taken by mouth) (Jardiance 25 mg daily, Glimepiride 4 mg am/2 gm evening, Ozempic 2 mg weekly.)  Problems taking diabetes medications regularly?: No  Diabetes medication side effects?: No    Problem Solving:  Problem Solving Assessed Today: Yes  Is the patient at risk for hypoglycemia?: Yes  Hypoglycemia Frequency: Never  Is the patient at risk for DKA?: No    Reducing Risks:  Reducing Risks Assessed Today: Yes  Diabetes Risks: Sedentary Lifestyle  CAD Risks: Diabetes Mellitus, Hypertension, Obesity, Sedentary lifestyle  Has dilated eye exam at least once a year?: Yes  Sees dentist every 6 months?: Yes  Feet checked by healthcare provider in the last year?: Yes    Healthy Coping:  Healthy Coping Assessed Today: Yes  Emotional response to diabetes: Concern for health and well-being, Ready to learn, Acceptance  Informal Support system:: Family  Stage of change: MAINTENANCE (Working to maintain change, with risk of relapse)  Patient Activation Measure Survey Score:  TED  Score (Last Two) 10/11/2018   TED Raw Score 30   Activation Score 56   TED Level 3     Care Plan and Education Provided:  Care Plan: Diabetes   Updates made by Harmony Bidr RD since 11/30/2022 12:00 AM      Problem: HbA1C Not In Goal       Goal: Establish Regular Follow-Ups with PCP       Task: Discuss with PCP the recommended timing for patient's next follow up visit(s)    Responsible User: Harmony Bird RD      Task: Discuss schedule for PCP visits with patient    Responsible User: Harmony Bird RD      Goal: Get HbA1C Level in Goal       Task: Educate patient on diabetes education self-management topics    Responsible User: Harmony Bird RD      Task: Educate patient on benefits of regular glucose monitoring    Responsible User: Harmony Bird RD      Task: Refer patient to appropriate extended care team member, as needed (Medication Therapy Management, Behavioral Health, Physical Therapy, etc.)    Responsible User: Harmony Bird RD      Task: Discuss diabetes treatment plan with patient    Responsible User: Harmony Bird RD      Problem: Diabetes Self-Management Education Needed to Optimize Self-Care Behaviors       Goal: Understand diabetes pathophysiology and disease progression       Task: Provide education on diabetes pathophysiology and disease progression specfic to patient's diabetes type    Responsible User: Harmony Bird RD      Goal: Healthy Eating - follow a healthy eating pattern for diabetes       Task: Provide education on portion control and consistency in amount, composition and timing of food intake    Responsible User: Harmony Bird RD      Task: Provide education on managing carbohydrate intake (carbohydrate counting, plate planning method, etc.)    Responsible User: Harmony Bird RD      Task: Provide education on weight management    Responsible User: Harmony Bird RD      Task: Provide education on heart healthy eating    Responsible User: Harmony Bird RD      Task: Provide  education on eating out    Responsible User: Harmony Bird RD      Task: Develop individualized healthy eating plan with patient    Responsible User: Harmony Bird RD      Goal: Being Active - get regular physical activity, working up to at least 150 minutes per week       Task: Provide education on relationship of activity to glucose and precautions to take if at risk for low glucose    Responsible User: Harmony Bird RD      Task: Discuss barriers to physical activity with patient    Responsible User: Harmony Bird RD      Task: Develop physical activity plan with patient    Responsible User: Harmony Bird RD      Task: Explore community resources including walking groups, assistance programs, and home videos    Responsible User: Harmony Bird RD      Goal: Monitoring - monitor glucose and ketones as directed    Start Date: 11/30/2022   This Visit's Progress: 0%   Note:    Pt will wear CGM for study to help determine if medication changes are indicated with increasing/fluctuating glucose levels.       Task: Provide education on blood glucose monitoring (purpose, proper technique, frequency, glucose targets, interpreting results, when to use glucose control solution, sharps disposal)    Responsible User: Harmony Bird RD      Task: Provide education on continuous glucose monitoring (sensor placement, use of massimo or /reader, understanding glucose trends, alerts and alarms, differences between sensor glucose and blood glucose)    Responsible User: Harmony Bird RD      Task: Provide education on ketone monitoring (when to monitor, frequency, etc.)    Responsible User: Harmony Bird RD      Goal: Taking Medication - patient is consistently taking medications as directed       Task: Provide education on action of prescribed medication, including when to take and possible side effects    Responsible User: Harmony Bird RD      Task: Provide education on insulin and injectable diabetes medications,  including administration, storage, site selection and rotation for injection sites    Responsible User: Harmony Bird RD      Task: Discuss barriers to medication adherence with patient and provide management technique ideas as appropriate    Responsible User: Harmony Bird RD      Task: Provide education on frequency and refill details of medications    Responsible User: Harmony Bird RD      Goal: Problem Solving - know how to prevent and manage short-term diabetes complications       Task: Provide education on high blood glucose - causes, signs/symptoms, prevention and treatment    Responsible User: Harmony Bird RD      Task: Provide education on low blood glucose - causes, signs/symptoms, prevention, treatment, carrying a carbohydrate source at all times, and medical identification    Responsible User: Harmony Bird RD      Task: Provide education on safe travel with diabetes    Responsible User: Harmony Bird RD      Task: Provide education on how to care for diabetes on sick days    Responsible User: Harmony Bird RD      Task: Provide education on when to call a health care provider    Responsible User: Harmony Bird RD      Goal: Reducing Risks - know how to prevent and treat long-term diabetes complications       Task: Provide education on major complications of diabetes, prevention, early diagnostic measures and treatment of complications    Responsible User: Harmony Bird RD      Task: Provide education on recommended care for dental, eye and foot health Completed 11/30/2022   Responsible User: Harmony Bird RD      Task: Provide education on Hemoglobin A1c - goals and relationship to blood glucose levels    Responsible User: Harmony Bird RD      Task: Provide education on recommendations for heart health - lipid levels and goals, blood pressure and goals, and aspirin therapy, if indicated    Responsible User: Harmony Bird RD      Task: Provide education on tobacco cessation    Responsible  User: Harmony Bird RD      Goal: Healthy Coping - use available resources to cope with the challenges of managing diabetes       Task: Discuss recognizing feelings about having diabetes    Responsible User: Harmony Bird RD      Task: Provide education on the benefits of making appropriate lifestyle changes    Responsible User: Harmony Bird RD      Task: Provide education on benefits of utilizing support systems    Responsible User: Harmony Bird RD      Task: Discuss methods for coping with stress    Responsible User: Harmony Bird RD      Task: Provide education on when to seek professional counseling    Responsible User: Harmony Bird RD        Time Spent: 50 minutes  Encounter Type: Individual    Any diabetes medication dose changes were made via the CDE Protocol per the patient's referring provider. A copy of this encounter was shared with the provider.        Sincerely,        Harmony Bird RD

## 2022-11-30 NOTE — PROGRESS NOTES
Diabetes Self-Management Education & Support    Presents for: Individual review    Type of Service: In Person Visit    ASSESSMENT:  Recent A1c 7.0%.  Pt had Ozempic increased to 2 mg May 2022 and Jardiance was increased to 25 mg 11/15/22.  Glucose levels still do not seem to be trending to target.  Fasting levels on download today 158-179, post supper 170-267.  Pt is agreeable to CGM study to better assess.      Patient's most recent   Lab Results   Component Value Date    A1C 7.0 11/15/2022    A1C 7.5 05/16/2022     is not meeting goal of <7.0    Diabetes knowledge and skills assessment:   Patient is knowledgeable in diabetes management concepts related to: Healthy Eating, Monitoring and Taking Medication    Based on learning assessment above, most appropriate setting for further diabetes education would be: Individual setting.      PLAN:  Sensor agreement signed.    Sensor attached to left arm. No redness, drainage or bleeding noted. Patient instructed on testing schedule, how to complete the patient log, restrictions during wear and to remove if having an x-ray, MRI or CT.    Patient return date requested from December 20th - 9:30 am.    Freestyle Yonis Sensor:  Lot#:[id number]  Expiration Date: 02/28/2023  Sensor S/N: [id number]    Patient's identity was verified by using patient's name and date of birth prior to insertion.    Sensor started: 9:10 am  2 minute re-check completed: 9:12 am    Written instructions and patient log given to patient.    Topics to cover at upcoming visits: CGM evaluation with Thuy Pandya NP.     Follow-up: December 20th - 9:30 am.    See Care Plan for co-developed, patient-state behavior change goals.  AVS provided for patient today.    Education Materials Provided:  Food Logs     SUBJECTIVE/OBJECTIVE:  Presents for: Individual review  Accompanied by: Self  Diabetes education in the past 24mo: Yes  Focus of Visit: Assistance w/ making life changes  Diabetes type: Type 2  Date of  diagnosis: 2018  Disease course: Getting harder to manage  How confident are you filling out medical forms by yourself:: Extremely  Diabetes management related comments/concerns: Glucose levels seem to be trending up even with recent increase in Jardiance dose.  Transportation concerns: No  Difficulty affording diabetes medication?: No  Difficulty affording diabetes testing supplies?: No  Other concerns:: None  Cultural Influences/Ethnic Background:  Not  or     Diabetes Symptoms & Complications:  Fatigue: Sometimes  Neuropathy: Sometimes  Polydipsia: No  Polyphagia: No  Polyuria: Sometimes  Visual change: No  Slow healing wounds: No  Symptom course: Stable  Weight trend: Decreasing (Weight is down 10# since last visit to [facility] May 2022, down 14# x 1 year.)  Complications assessed today?: Yes  Autonomic neuropathy: No  CVA: No  Heart disease: No  Nephropathy: No  Peripheral neuropathy: Other  Peripheral Vascular Disease: No  Retinopathy: No  Sexual dysfunction: No    Patient Problem List and Family Medical History reviewed for relevant medical history, current medical status, and diabetes risk factors.    Vitals:  /79 (BP Location: Right arm, Patient Position: Sitting, Cuff Size: Adult Large)   Pulse 68   Ht 1.829 m (6')   Wt 132.9 kg (293 lb 1.6 oz)   SpO2 98%   BMI 39.75 kg/m    Estimated body mass index is 39.75 kg/m  as calculated from the following:    Height as of this encounter: 1.829 m (6').    Weight as of this encounter: 132.9 kg (293 lb 1.6 oz).   Last 3 BP:   BP Readings from Last 3 Encounters:   11/30/22 137/79   11/15/22 133/78   08/15/22 (!) 140/80       History   Smoking Status     Never   Smokeless Tobacco     Former     Types: Snuff     Quit date: 10/1/2015       Labs:  Lab Results   Component Value Date    A1C 7.0 11/15/2022    A1C 7.5 05/16/2022     Lab Results   Component Value Date     11/15/2022     08/15/2022     10/14/2020     Lab Results    Component Value Date    LDL 56 08/15/2022    LDL 58 07/13/2020     HDL Cholesterol   Date Value Ref Range Status   07/13/2020 35 (L) >39 mg/dL Final     Direct Measure HDL   Date Value Ref Range Status   08/15/2022 36 (L) >=40 mg/dL Final   ]  GFR Estimate   Date Value Ref Range Status   11/15/2022 >90 >60 mL/min/1.73m2 Final     Comment:     Effective December 21, 2021 eGFRcr in adults is calculated using the 2021 CKD-EPI creatinine equation which includes age and gender (Reece et al., NEJ, DOI: 10.1056/LEDNeh9725200)   10/14/2020 >90 >60 mL/min/[1.73_m2] Final     Comment:     Non  GFR Calc  Starting 12/18/2018, serum creatinine based estimated GFR (eGFR) will be   calculated using the Chronic Kidney Disease Epidemiology Collaboration   (CKD-EPI) equation.       GFR Estimate If Black   Date Value Ref Range Status   10/14/2020 >90 >60 mL/min/[1.73_m2] Final     Comment:      GFR Calc  Starting 12/18/2018, serum creatinine based estimated GFR (eGFR) will be   calculated using the Chronic Kidney Disease Epidemiology Collaboration   (CKD-EPI) equation.       Lab Results   Component Value Date    CR 0.91 11/15/2022    CR 0.74 10/14/2020     No results found for: MICROALBUMIN    Healthy Eating:  Healthy Eating Assessed Today: Yes  Cultural/Scientology diet restrictions?: No  Meal planning/habits: Avoiding sweets, Low carb, Low salt, Other  How many times a week on average do you eat food made away from home (restaurant/take-out)?: 1  Meals include: Breakfast, Lunch, Dinner, Afternoon Snack  Breakfast: 3-4 eggs and 2-3 slices bob or 1 sausage  Lunch: Hamburger on bun or 2/3 ring bologna or pasta  Dinner: 4 eggs, 2 toast or frozen pizza 1/2  Snacks: Afternoon- handful pringles  Beverages: Water, Milk, Diet soda, Alcohol, Other ([place] Coffee occasionally, light beer occasionally)  Has patient met with a dietitian in the past?: Yes    Being Active:  Being Active Assessed Today:  Yes  Exercise:: Currently not exercising  Barrier to exercise: None    Monitoring:  Monitoring Assessed Today: Yes  Did patient bring glucose meter to appointment? : Yes  Blood Glucose Meter: ContourNext  Times checking blood sugar at home (number): 2  Times checking blood sugar at home (per): Day  Blood glucose trend: Fluctuating  See above note    Taking Medications:  Diabetes Medication(s)     Sodium-Glucose Co-Transporter 2 (SGLT2) Inhibitors       empagliflozin (JARDIANCE) 25 MG TABS tablet    Take 1 tablet (25 mg) by mouth daily    Sulfonylureas       glimepiride (AMARYL) 2 MG tablet    TAKE 2 TABLETS BY MOUTH IN THEMORNING AND 1 TABLET IN THE EVENING WITH MEALS    Incretin Mimetic Agents       OZEMPIC, 2 MG/DOSE, 8 MG/3ML SOPN    INJECT 2MG SUBCUTANEOUSLY ONCE WEEKLY        Taking Medication Assessed Today: Yes  Current Treatments: Non-insulin Injectables, Oral Medication (taken by mouth) (Jardiance 25 mg daily, Glimepiride 4 mg am/2 gm evening, Ozempic 2 mg weekly.)  Problems taking diabetes medications regularly?: No  Diabetes medication side effects?: No    Problem Solving:  Problem Solving Assessed Today: Yes  Is the patient at risk for hypoglycemia?: Yes  Hypoglycemia Frequency: Never  Is the patient at risk for DKA?: No    Reducing Risks:  Reducing Risks Assessed Today: Yes  Diabetes Risks: Sedentary Lifestyle  CAD Risks: Diabetes Mellitus, Hypertension, Obesity, Sedentary lifestyle  Has dilated eye exam at least once a year?: Yes  Sees dentist every 6 months?: Yes  Feet checked by healthcare provider in the last year?: Yes    Healthy Coping:  Healthy Coping Assessed Today: Yes  Emotional response to diabetes: Concern for health and well-being, Ready to learn, Acceptance  Informal Support system:: Family  Stage of change: MAINTENANCE (Working to maintain change, with risk of relapse)  Patient Activation Measure Survey Score:  TED Score (Last Two) 10/11/2018   TED Raw Score 30   Activation Score 56   TED  Level 3     Care Plan and Education Provided:  Care Plan: Diabetes   Updates made by Harmony Bird RD since 11/30/2022 12:00 AM      Problem: HbA1C Not In Goal       Goal: Establish Regular Follow-Ups with PCP       Task: Discuss with PCP the recommended timing for patient's next follow up visit(s)    Responsible User: Harmony Bird RD      Task: Discuss schedule for PCP visits with patient    Responsible User: Harmony Bird RD      Goal: Get HbA1C Level in Goal       Task: Educate patient on diabetes education self-management topics    Responsible User: Harmony Bird RD      Task: Educate patient on benefits of regular glucose monitoring    Responsible User: Harmony Bird RD      Task: Refer patient to appropriate extended care team member, as needed (Medication Therapy Management, Behavioral Health, Physical Therapy, etc.)    Responsible User: Harmony Bird RD      Task: Discuss diabetes treatment plan with patient    Responsible User: Harmony Bird RD      Problem: Diabetes Self-Management Education Needed to Optimize Self-Care Behaviors       Goal: Understand diabetes pathophysiology and disease progression       Task: Provide education on diabetes pathophysiology and disease progression specfic to patient's diabetes type    Responsible User: Harmony Bird RD      Goal: Healthy Eating - follow a healthy eating pattern for diabetes       Task: Provide education on portion control and consistency in amount, composition and timing of food intake    Responsible User: Harmony Bird RD      Task: Provide education on managing carbohydrate intake (carbohydrate counting, plate planning method, etc.)    Responsible User: Harmony Bird RD      Task: Provide education on weight management    Responsible User: Harmony Bird RD      Task: Provide education on heart healthy eating    Responsible User: Harmony Bird RD      Task: Provide education on eating out    Responsible User: Harmony Bird RD      Task:  Develop individualized healthy eating plan with patient    Responsible User: Harmony Bird RD      Goal: Being Active - get regular physical activity, working up to at least 150 minutes per week       Task: Provide education on relationship of activity to glucose and precautions to take if at risk for low glucose    Responsible User: Harmony Bird RD      Task: Discuss barriers to physical activity with patient    Responsible User: Harmony Bird RD      Task: Develop physical activity plan with patient    Responsible User: Harmony Bird RD      Task: Explore community resources including walking groups, assistance programs, and home videos    Responsible User: Harmony Bird RD      Goal: Monitoring - monitor glucose and ketones as directed    Start Date: 11/30/2022   This Visit's Progress: 0%   Note:    Pt will wear CGM for study to help determine if medication changes are indicated with increasing/fluctuating glucose levels.       Task: Provide education on blood glucose monitoring (purpose, proper technique, frequency, glucose targets, interpreting results, when to use glucose control solution, sharps disposal)    Responsible User: Harmony Bird RD      Task: Provide education on continuous glucose monitoring (sensor placement, use of massimo or /reader, understanding glucose trends, alerts and alarms, differences between sensor glucose and blood glucose)    Responsible User: Harmony Bird RD      Task: Provide education on ketone monitoring (when to monitor, frequency, etc.)    Responsible User: Harmony Bird RD      Goal: Taking Medication - patient is consistently taking medications as directed       Task: Provide education on action of prescribed medication, including when to take and possible side effects    Responsible User: Harmony Bird RD      Task: Provide education on insulin and injectable diabetes medications, including administration, storage, site selection and rotation for injection  sites    Responsible User: Harmony Bird RD      Task: Discuss barriers to medication adherence with patient and provide management technique ideas as appropriate    Responsible User: Harmony Bird RD      Task: Provide education on frequency and refill details of medications    Responsible User: Harmony Bird RD      Goal: Problem Solving - know how to prevent and manage short-term diabetes complications       Task: Provide education on high blood glucose - causes, signs/symptoms, prevention and treatment    Responsible User: Harmony Bird RD      Task: Provide education on low blood glucose - causes, signs/symptoms, prevention, treatment, carrying a carbohydrate source at all times, and medical identification    Responsible User: Harmony Bird RD      Task: Provide education on safe travel with diabetes    Responsible User: Harmony Bird RD      Task: Provide education on how to care for diabetes on sick days    Responsible User: Harmony Bird RD      Task: Provide education on when to call a health care provider    Responsible User: Harmony Bird RD      Goal: Reducing Risks - know how to prevent and treat long-term diabetes complications       Task: Provide education on major complications of diabetes, prevention, early diagnostic measures and treatment of complications    Responsible User: Harmony Bird RD      Task: Provide education on recommended care for dental, eye and foot health Completed 11/30/2022   Responsible User: Harmony Bird RD      Task: Provide education on Hemoglobin A1c - goals and relationship to blood glucose levels    Responsible User: Harmony Bird RD      Task: Provide education on recommendations for heart health - lipid levels and goals, blood pressure and goals, and aspirin therapy, if indicated    Responsible User: Harmony Bird RD      Task: Provide education on tobacco cessation    Responsible User: Harmony Bird RD      Goal: Healthy Coping - use available resources  to cope with the challenges of managing diabetes       Task: Discuss recognizing feelings about having diabetes    Responsible User: Harmony Bird RD      Task: Provide education on the benefits of making appropriate lifestyle changes    Responsible User: Harmony Bird RD      Task: Provide education on benefits of utilizing support systems    Responsible User: Harmony Bird RD      Task: Discuss methods for coping with stress    Responsible User: Harmony Bird RD      Task: Provide education on when to seek professional counseling    Responsible User: Harmony Bird RD        Time Spent: 50 minutes  Encounter Type: Individual    Any diabetes medication dose changes were made via the CDE Protocol per the patient's referring provider. A copy of this encounter was shared with the provider.

## 2022-11-30 NOTE — PATIENT INSTRUCTIONS
-Keep food logs while doing the continuous glucose monitor study.  -Keep taking your same diabetes medications.    -Okay to remove and place in [facility] [[email or web address] after December 14th.    -Follow up December 20th at 9:30 am with for evaluation.  -Call with any concerns - MICHAEL Aviles, [facility] [phone].

## 2022-12-19 ENCOUNTER — LAB (OUTPATIENT)
Dept: LAB | Facility: OTHER | Age: 47
End: 2022-12-19
Payer: COMMERCIAL

## 2022-12-19 ENCOUNTER — TRANSFERRED RECORDS (OUTPATIENT)
Dept: HEALTH INFORMATION MANAGEMENT | Facility: CLINIC | Age: 47
End: 2022-12-19

## 2022-12-19 DIAGNOSIS — E11.9 TYPE 2 DIABETES MELLITUS WITHOUT COMPLICATION, WITHOUT LONG-TERM CURRENT USE OF INSULIN (H): ICD-10-CM

## 2022-12-19 LAB
ANION GAP SERPL CALCULATED.3IONS-SCNC: 12 MMOL/L (ref 7–15)
BUN SERPL-MCNC: 10 MG/DL (ref 6–20)
CALCIUM SERPL-MCNC: 8.5 MG/DL (ref 8.6–10)
CHLORIDE SERPL-SCNC: 100 MMOL/L (ref 98–107)
CREAT SERPL-MCNC: 0.91 MG/DL (ref 0.67–1.17)
DEPRECATED HCO3 PLAS-SCNC: 25 MMOL/L (ref 22–29)
EST. AVERAGE GLUCOSE BLD GHB EST-MCNC: 157 MG/DL
GFR SERPL CREATININE-BSD FRML MDRD: >90 ML/MIN/1.73M2
GLUCOSE SERPL-MCNC: 196 MG/DL (ref 70–99)
HBA1C MFR BLD: 7.1 %
POTASSIUM SERPL-SCNC: 4.1 MMOL/L (ref 3.4–5.3)
RETINOPATHY: POSITIVE
SODIUM SERPL-SCNC: 137 MMOL/L (ref 136–145)

## 2022-12-19 PROCEDURE — 36415 COLL VENOUS BLD VENIPUNCTURE: CPT

## 2022-12-19 PROCEDURE — 83036 HEMOGLOBIN GLYCOSYLATED A1C: CPT

## 2022-12-19 PROCEDURE — 80048 BASIC METABOLIC PNL TOTAL CA: CPT

## 2022-12-20 ENCOUNTER — ALLIED HEALTH/NURSE VISIT (OUTPATIENT)
Dept: EDUCATION SERVICES | Facility: OTHER | Age: 47
End: 2022-12-20
Attending: PSYCHOLOGIST
Payer: COMMERCIAL

## 2022-12-20 ENCOUNTER — TELEPHONE (OUTPATIENT)
Dept: EDUCATION SERVICES | Facility: OTHER | Age: 47
End: 2022-12-20

## 2022-12-20 VITALS
BODY MASS INDEX: 39.17 KG/M2 | DIASTOLIC BLOOD PRESSURE: 72 MMHG | OXYGEN SATURATION: 97 % | HEIGHT: 72 IN | HEART RATE: 87 BPM | WEIGHT: 289.2 LBS | SYSTOLIC BLOOD PRESSURE: 130 MMHG | RESPIRATION RATE: 16 BRPM

## 2022-12-20 DIAGNOSIS — E11.65 TYPE 2 DIABETES MELLITUS WITH HYPERGLYCEMIA, WITHOUT LONG-TERM CURRENT USE OF INSULIN (H): Primary | ICD-10-CM

## 2022-12-20 PROCEDURE — 95250 CONT GLUC MNTR PHYS/QHP EQP: CPT | Performed by: NURSE PRACTITIONER

## 2022-12-20 PROCEDURE — 99214 OFFICE O/P EST MOD 30 MIN: CPT | Mod: 25 | Performed by: NURSE PRACTITIONER

## 2022-12-20 ASSESSMENT — PAIN SCALES - GENERAL: PAINLEVEL: MILD PAIN (2)

## 2022-12-20 NOTE — TELEPHONE ENCOUNTER
"Attempt # 1  Outcome: Left Message   Comment: lvm for pt to call and schedule \"glucose review/A1c check\" with Harmony Bird sometime after 3/20/22       "

## 2022-12-20 NOTE — PROGRESS NOTES
SUBJECTIVE:  Bucky Franco, 47 year old, male presents with the following Chief Complaint(s) with HPI to follow:  Chief Complaint   Patient presents with     Diabetes        Diabetes Follow-up      Patient is checking blood sugars: twice daily.  Results:    No meter today      Symptoms of hypoglycemia (low blood sugar): rare     Paresthesias (numbness or burning in feet) or sores: Yes--some in feet; no sores    Diabetic eye exam within the last year: Yes    Breakfast eaten regularly: Yes    Patient counting carbs: No       HPI:  Bucky's here today for the follow up regarding his Diabetes mellitus, Type 2.    Lab Results   Component Value Date    A1C 7.1 12/19/2022    A1C 7.0 11/15/2022    A1C 6.8 08/15/2022    A1C 7.5 05/16/2022    A1C 6.6 02/15/2022    A1C 6.9 11/15/2021    A1C 6.3 08/13/2021    A1C 6.2 05/14/2021    A1C 5.8 01/14/2021    A1C 8.5 10/14/2020     Current Diabetes medication:   1.  Ozempic 2 mg weekly (Monday)  2.  Amaryl 2 mg, 2 tablets in AM, 1 afternoon    3.  Jardiance 25 mg daily  ASA use: yes  Statin use: yes    Bucky's here today for the evaluation of his professional CGM study.   He reports the following:  Diagnosed with diabetes: 2019  Family diabetes history: yes, both sides    No new changes in his health.      Wt Readings from Last 4 Encounters:   12/20/22 131.2 kg (289 lb 3.2 oz)   11/30/22 132.9 kg (293 lb 1.6 oz)   11/15/22 132.9 kg (293 lb)   08/15/22 135.6 kg (299 lb)       Patient Active Problem List   Diagnosis     Health examination of defined subpopulation     Sinus headache     Somatic dysfunction of cervical region     Headache     Morbid obesity (H)     Type 2 diabetes mellitus without complication, without long-term current use of insulin (H)     Encounter for diabetic foot exam (H)     Benign essential hypertension     Mixed hyperlipidemia     Esophageal reflux       Past Medical History:   Diagnosis Date     GERD (gastroesophageal reflux disease) 05/18/2012     Gout,  unspecified 09/07/2005     Hyperlipidemia 05/18/2012       Past Surgical History:   Procedure Laterality Date     ADENOIDECTOMY       APPENDECTOMY       CIRCUMCISION       lap band  2014     Onychomycoses       TONSILLECTOMY         Family History   Problem Relation Age of Onset     Hypertension Mother      Rheumatoid Arthritis Mother      Diabetes Sister      Hyperlipidemia Father      Myocardial Infarction Paternal Grandfather      Diabetes Other      Asthma No family hx of        Social History     Tobacco Use     Smoking status: Never     Smokeless tobacco: Former     Types: Snuff     Quit date: 10/1/2015   Substance Use Topics     Alcohol use: Yes     Comment: Rarely       Current Outpatient Medications   Medication Sig Dispense Refill     aspirin 81 MG tablet Take 1 tablet (81 mg) by mouth daily 90 tablet 3     blood glucose (CONTOUR NEXT TEST) test strip Use to test blood sugar 2 times daily. 100 each 6     blood glucose monitoring (GURPREET MICROLET) lancets Use to test blood sugar 2 times daily. 100 each 6     CARDIZEM  MG 24 hr tablet TAKE 1 TABLET BY MOUTH DAILY ALONG WITH 1-240MG TABLET 90 tablet 3     diltiazem ER (TIAZAC) 240 MG 24 hr ER beaded capsule TAKE 1 CAPSULE BY MOUTH DAILY WITH A 120MG CAPSULE 90 capsule 3     empagliflozin (JARDIANCE) 25 MG TABS tablet Take 1 tablet (25 mg) by mouth daily 90 tablet 1     glimepiride (AMARYL) 2 MG tablet TAKE 2 TABLETS BY MOUTH IN THE MORNING AND 1 TABLET IN THE EVENING WITH MEALS 270 tablet 3     insulin pen needle (32G X 4 MM) 32G X 4 MM miscellaneous Use 1 pen needle daily or as directed. 100 each 6     lisinopril (ZESTRIL) 40 MG tablet TAKE 1 TABLET BY MOUTH DAILY 90 tablet 3     metoprolol succinate ER (TOPROL XL) 25 MG 24 hr tablet Take 1 tablet (25 mg) by mouth daily 90 tablet 3     omeprazole (PRILOSEC) 20 MG DR capsule Take 1 capsule (20 mg) by mouth daily Take 1/2 hour before your first meal of the day 90 capsule 3     OZEMPIC, 2 MG/DOSE, 8 MG/3ML  SOPN INJECT 2MG SUBCUTANEOUSLY ONCE WEEKLY 3 mL 3     rosuvastatin (CRESTOR) 10 MG tablet TAKE 1 TABLET BY MOUTH DAILY 90 tablet 3       Allergies   Allergen Reactions     Hctz [Hydrochlorothiazide] Other (See Comments)     Possible pancreatitis        REVIEW OF SYSTEMS  Skin: left leg itchy patch  Eyes: UTD eye exam; right eye--watching   Ears/Nose/Throat: devated septum  Respiratory: No shortness of breath, dyspnea on exertion, cough, or hemoptysis  Cardiovascular: negative  Gastrointestinal: negative  Genitourinary: negative  Musculoskeletal: cramping; arthritis,   Neurologic: positive for numbness or tingling of feet  Psychiatric: negative   Hematologic/Lymphatic/Immunologic: negative  Endocrine: positive for diabetes    OBJECTIVE:  /72   Pulse 87   Resp 16   Ht 1.829 m (6')   Wt 131.2 kg (289 lb 3.2 oz)   SpO2 97%   BMI 39.22 kg/m    Constitutional: alert and no distress  Respiratory:  Good diaphragmatic excursion.  Musculoskeletal: extremities normal- no gross deformities noted and gait normal  Skin: nickel size pink, scaly patch on his medial leg; otherwise no suspicious lesions or rashes to visible skin  Psychiatric: mentation appears normal and affect normal/bright    LABS  No results found for any visits on 12/20/22.    ASSESSMENT / PLAN:  (E11.65) Type 2 diabetes mellitus with hyperglycemia, without long-term current use of insulin (H)  (primary encounter diagnosis)  Comment: noted the CGM as followed:    Date: 11/30 to 12/14/22  Glucose management indicator: 7.4%    Average glucose: 170    Glucose range  Very low (<54): 1%  low (<70): 1%  In target range (): 60%  High (>180): 29%  Very high (>250): 9%    Plan: GLUCOSE MONITORING CONTINUOUS, >=72 HR          Through discussions and evaluation, many of Bucky's spikes were related to cold cereal consumption    We did talk about finding other food options, especially for breakfast  Encouraged him to add more protein to his meals and snacks.      We did talk about a personal CGM  He will think about it.     Questions answered.     Follow up  3 months with Harmony MALIK RD CDE    Call with any questions/concerns    Patient Instructions   Continue working on healthy eating and moving (start low and slow, work up to 30 min, 5x/week)    BG goals:  Fasting and before meals <130, >70  2 hour after eating <180    We only need 1/2 of these numbers to be within target then your A1c will be within target    Medication changes   No change in medication    Keep working on healthy eating and moving  Keep adding protein  Find an alternative to cold cereal    Follow up   3 month--A1c with Harmony MALIK RD CDE    Call me sooner if any problems/concerns and/or questions develop including consistent low BGs <70 or consistent high BGs >200  [phone] (Unit Coordinator)    [phone] (Nurse)        Time: 30 min  Barrier: none  Willingness to learn: accepting    Thuy CAPELLAN [facility]  Diabetes and Wound Care    Cc: Dr. De Santiago    30 minutes was spent with patient.    All of this time was spent on counseling patient regarding illness, medication and/or treatment options, coordinating further cares and follow ups that are needed along with resource material that will be helpful in the treatment of these issues.         With the electronic record, we can now more quickly and easily track our patient diabetic goals. Our diabetes clinical review is in progress and these are the indicators we are monitoring for good diabetes health.     1.) HbA1C less than 7 (measurement of your average blood sugars)  2.) Blood Pressure less than 140/80  3.) LDL less than 100 (bad cholesterol)  4.) HbA1C is checked in the last 6 months and below 7% (more frequently if not at goal or adjusting medications)  5.) LDL is checked in the last 12 months (more frequently if not at goal or adjusting medications)  6.) Taking one baby aspirin daily (unless otherwise instructed)  7.) No tobacco use  8)  Statin use     You have achieved 7 out of 8 of these and I am encouraging you to come in and get tuned up to achieve 8 out of 8!  Here is what you have achieved so far in my goals for you:  1.) HbA1C  less than 7:                              NO  Your last  HbA1C :  Lab Results   Component Value Date    A1C 7.1 12/19/2022    A1C 7.5 05/16/2022   .  2.) Blood Pressure less than 140/80:       YES      Your last    BP Readings from Last 1 Encounters:   12/20/22 130/72     3.) LDL less than 100:                              YES      Your last     Lab Results   Component Value Date    LDL 56 08/15/2022    LDL 58 07/13/2020     4.) Checked HbA1C in the past 6 months: YES      5.) Checked LDL in the past 12 months:    YES      6.) Taking one aspirin daily:                       YES     7.) No tobacco use:                                        YES      8.) Statin use      YES

## 2022-12-20 NOTE — PATIENT INSTRUCTIONS
Continue working on healthy eating and moving (start low and slow, work up to 30 min, 5x/week)    BG goals:  Fasting and before meals <130, >70  2 hour after eating <180    We only need 1/2 of these numbers to be within target then your A1c will be within target    Medication changes   No change in medication    Keep working on healthy eating and moving  Keep adding protein  Find an alternative to cold cereal    Follow up   3 month--A1c with Harmony MALIK RD CDE    Call me sooner if any problems/concerns and/or questions develop including consistent low BGs <70 or consistent high BGs >200  [phone] (Unit Coordinator)    [phone] (Nurse)

## 2022-12-27 ENCOUNTER — OFFICE VISIT (OUTPATIENT)
Dept: PULMONOLOGY | Facility: OTHER | Age: 47
End: 2022-12-27
Attending: FAMILY MEDICINE
Payer: COMMERCIAL

## 2022-12-27 DIAGNOSIS — I10 BENIGN ESSENTIAL HYPERTENSION: ICD-10-CM

## 2022-12-27 DIAGNOSIS — E66.01 MORBID OBESITY (H): Primary | ICD-10-CM

## 2022-12-27 DIAGNOSIS — E11.9 TYPE 2 DIABETES MELLITUS WITHOUT COMPLICATION, WITHOUT LONG-TERM CURRENT USE OF INSULIN (H): ICD-10-CM

## 2022-12-27 PROCEDURE — 99203 OFFICE O/P NEW LOW 30 MIN: CPT | Performed by: FAMILY MEDICINE

## 2022-12-27 NOTE — PROGRESS NOTES
Sleep Medicine Clinic Rooming Note    Patient was identified appropriately by name and date of birth.    Medication Reconciliation: complete    Chief complaint: diabetic hard to control number, sleep only 3-4 hours at a time       Height: 71 ft/inches  Weight: 285 lbs  SpO2: 97  HR: 70  BP: 148/80  Neck circumference: 17 inches     [place] Sleepiness Scale    How likely are you to doze off or fall asleep in the following situations, in contrast to just feeling tired?    This refers to your usual way of life recently.    Even if you haven't done some of these things recently, try to figure out how they would have affected you.    Use the following scale to choose the most appropriate number for each situation:  0 = NO CHANCE of dozing  1 = SLIGHT CHANCE of dozing  2 = MODERATE CHANCE of dozing  3 = HIGH CHANCE of dozing    Sitting and Readin  Watching television: 2  Sitting inactive in a public place:0  Riding in car:0  Laying down to rest in the afternoon:3  Sitting and talking to someone:0  Sitting quietly after lunch without alcohol:2  In a car, stopped in traffic for a few minutes:1    Score: 10    DME needs: -    Additional Notes: -

## 2022-12-27 NOTE — PATIENT INSTRUCTIONS
We will arrange the home sleep test sent through the mail.      Trae Bueno MD    Sleep Medicine  [place], [place]  Main Office: [phone]  [facility] [address], [address]  Schedule visits: [phone]  Main Office: [phone]  Fax: [phone]

## 2022-12-27 NOTE — PROGRESS NOTES
Bucky Franco is a 47 year old male who is being evaluated via in-person clinic visit.       Visit Details     In-clinic visit for increased pretest for obstructive sleep apnea.     Assessment:  - STOP-BANG score of 4+  - Comorbid hypertension, type 2 diabetes, obesity with BMI approximately 39    Plan:  -Appears to be an appropriate candidate for either home sleep testing or in lab PSG.  - We agreed to proceed with watch pat home sleep testing, orders placed today.  He is comfortable with receiving results via [employer].    SUBJECTIVE:  Bucky Franco is a 47 year old male.    Pertinent PMHx of headaches, morbid obesity status post lap band procedure in 2013 or 2014, gout, DM II, HLD, HTN.    Today -he presents today under recommendation from his care team, given comorbid type 2 diabetes and hypertension.    He does not directly identify concern for sleep, but he does feel that he may not get enough sleep and has caused some daytime fatigue.  He lives alone, but is not aware of any snoring or observed apnea.    On workdays he typically goes to bed between 10-10:30 PM, falls asleep within 15 minutes.  He will have 2-3 awakenings, usually to urinate, seems to fall back asleep a reasonable amount of time.  Awakens by alarm or naturally between 5- 5:30 AM.    On weekends, he will go to bed between 11 PM-midnight, and up between 6:54 AM naturally.  He often does nap for an hour or more on weekend days.    Family history: No known sleep apnea, mother with irregular almost bimodal sleep pattern    Social history: He lives alone, with 2 dogs and 1 cat.  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    Estimated body mass index is 39.22 kg/m  as calculated from the following:    Height as of 12/20/22: 1.829 m (6').    Weight as of 12/20/22: 131.2 kg (289 lb 3.2 oz).        Past medical history:    Patient Active Problem List    Diagnosis Date Noted     Encounter for  diabetic foot exam (H) 10/31/2018     Priority: Medium     Benign essential hypertension 10/31/2018     Priority: Medium     Type 2 diabetes mellitus without complication, without long-term current use of insulin (H) 10/30/2018     Priority: Medium     Morbid obesity (H) 10/11/2018     Priority: Medium     Headache 03/31/2015     Priority: Medium     Problem list name updated by automated process. Provider to review       Sinus headache 03/13/2015     Priority: Medium     Somatic dysfunction of cervical region 03/13/2015     Priority: Medium     Health examination of defined subpopulation 05/03/2013     Priority: Medium     Mixed hyperlipidemia 05/18/2012     Priority: Medium     Esophageal reflux 05/18/2012     Priority: Medium       10 point ROS of systems including Constitutional, Eyes, Respiratory, Cardiovascular, Gastroenterology, Genitourinary, Integumentary, Muscularskeletal, Psychiatric were all negative except for pertinent positives noted in my HPI.    Current Outpatient Medications   Medication Sig Dispense Refill     aspirin 81 MG tablet Take 1 tablet (81 mg) by mouth daily 90 tablet 3     blood glucose (CONTOUR NEXT TEST) test strip Use to test blood sugar 2 times daily. 100 each 6     blood glucose monitoring ([employer]) lancets Use to test blood sugar 2 times daily. 100 each 6     CARDIZEM  MG 24 hr tablet TAKE 1 TABLET BY MOUTH DAILY ALONG WITH 1-240MG TABLET 90 tablet 3     diltiazem ER (TIAZAC) 240 MG 24 hr ER beaded capsule TAKE 1 CAPSULE BY MOUTH DAILY WITH A 120MG CAPSULE 90 capsule 3     empagliflozin (JARDIANCE) 25 MG TABS tablet Take 1 tablet (25 mg) by mouth daily 90 tablet 1     glimepiride (AMARYL) 2 MG tablet TAKE 2 TABLETS BY MOUTH IN THE MORNING AND 1 TABLET IN THE EVENING WITH MEALS 270 tablet 3     insulin pen needle (32G X 4 MM) 32G X 4 MM miscellaneous Use 1 pen needle daily or as directed. 100 each 6     lisinopril (ZESTRIL) 40 MG tablet TAKE 1 TABLET BY MOUTH DAILY 90  tablet 3     metoprolol succinate ER (TOPROL XL) 25 MG 24 hr tablet Take 1 tablet (25 mg) by mouth daily 90 tablet 3     omeprazole (PRILOSEC) 20 MG DR capsule Take 1 capsule (20 mg) by mouth daily Take 1/2 hour before your first meal of the day 90 capsule 3     OZEMPIC, 2 MG/DOSE, 8 MG/3ML SOPN INJECT 2MG SUBCUTANEOUSLY ONCE WEEKLY 3 mL 3     rosuvastatin (CRESTOR) 10 MG tablet TAKE 1 TABLET BY MOUTH DAILY 90 tablet 3       OBJECTIVE:  There were no vitals taken for this visit.    Physical Exam     ---  This note was written with the assistance of the Dragon voice-dictation technology software. The final document, although reviewed, may contain errors. For corrections, please contact the office.    Trae Bueno MD    Sleep Medicine    [place], [place]  o Main Office: [phone]    [facility] [place], [place]  o [address], [address]  o Schedule visits: [phone]  o Main Office: [phone]  o Fax: [phone]    Time spent on the date of service:  30 minutes.

## 2023-01-10 ENCOUNTER — OFFICE VISIT (OUTPATIENT)
Dept: SLEEP MEDICINE | Facility: HOSPITAL | Age: 48
End: 2023-01-10
Attending: FAMILY MEDICINE
Payer: COMMERCIAL

## 2023-01-10 DIAGNOSIS — G47.33 OSA (OBSTRUCTIVE SLEEP APNEA): Primary | ICD-10-CM

## 2023-01-11 NOTE — NURSING NOTE
Patient was provided both verbal and written education and instructions on use of Watch PAT device. Watch PAT device has been registered and shipped via [employer] on 1/11/2023. Patient was notified that package was mailed out. Watch [employer] serial number: [id number].

## 2023-02-14 ENCOUNTER — VIRTUAL VISIT (OUTPATIENT)
Dept: SLEEP MEDICINE | Facility: HOSPITAL | Age: 48
End: 2023-02-14
Attending: FAMILY MEDICINE
Payer: COMMERCIAL

## 2023-02-14 DIAGNOSIS — I10 BENIGN ESSENTIAL HYPERTENSION: ICD-10-CM

## 2023-02-14 DIAGNOSIS — E66.01 MORBID OBESITY (H): ICD-10-CM

## 2023-02-14 DIAGNOSIS — E11.9 TYPE 2 DIABETES MELLITUS WITHOUT COMPLICATION, WITHOUT LONG-TERM CURRENT USE OF INSULIN (H): ICD-10-CM

## 2023-02-14 PROCEDURE — 95800 SLP STDY UNATTENDED: CPT | Mod: TEL,95

## 2023-02-14 PROCEDURE — 95800 SLP STDY UNATTENDED: CPT | Mod: 26 | Performed by: FAMILY MEDICINE

## 2023-02-14 NOTE — PROGRESS NOTES
WatchPAT data has been received and has been scored using rule 1B, 4%. Patient to follow up with provider to determine appropriate therapy.    Pat AHI: 10.8    Ordering Provider: Dr Trae Bueno      Sleep Technician/Technologist: Kathy WOO

## 2023-02-14 NOTE — PROCEDURES
WatchPAT - HOME SLEEP STUDY INTERPRETATION    Patient: Bucky Franco  MRN: [id number]  YOB: 1975  Study Date: 2023  Referring Provider: Naomi De Santiago  Ordering Provider: Trae Bueno MD, MD    Chain of custody patient verification was not enabled.       Indications for Home Study: Bucky Franco is a 47 year old male with a history of headaches, morbid obesity status post lap band procedure in  or , gout, DM II, HLD, HTN who presents with symptoms suggestive of obstructive sleep apnea.    Estimated body mass index is 39.22 kg/m  as calculated from the following:    Height as of 22: 1.829 m (6').    Weight as of 22: 131.2 kg (289 lb 3.2 oz).  STOP-BAN/8    Data: A full night home sleep study was performed recording the standard physiologic parameters including peripheral arterial tonometry (PAT), sound/snoring, body position,  movement, sound, and oxygen saturation by pulse oximetry. Pulse rate was estimated by oximetry recording. Sleep staging (wake, REM, light, and deep sleep) was derived from PAT signal.  This study was considered adequate based on > 4 hours of quality oximetry and respiratory recording. As specified by the AASM Manual for the Scoring of Sleep and Associated events, version 2.3, Rule VIII.D 1B, 4% oxygen desaturation scoring for hypopneas is used as a standard of care on all home sleep apnea testing.    Total Recording Time: 7 hrs, 59 min  Total Sleep Time: 6 hrs, 30 min  % of Sleep Time REM: 26.1%    Respiratory:  Snoring: Snoring was present.  Respiratory events: The PAT respiratory disturbance index [pRDI] was 16.7 events per hour.  The PAT apnea/hypopnea index [pAHI] was 10.8 events per hour.  AMEYA was 8.3 events per hour.  During REM sleep the pAHI was 22.4.  Sleep Associated Hypoxemia: sustained hypoxemia was not present. Mean oxygen saturation was 93%.  Minimum was 83%.  Time with saturation less than 88% was 1 minutes.    Heart  Rate: By pulse oximetry normal rate was noted.     Position: Percent of time spent: supine - 81.1%, prone - 0%, on right - 18.9%, on left - 0%.  pAHI was 13.1 per hour supine, - per hour prone, 0.8 per hour on right side, and - per hour on left side.     Assessment:   Mild obstructive sleep apnea.  Sleep associated hypoxemia was not present.    Recommendations:  Consider auto-CPAP at 5-15 cmH2O, oral appliance therapy, positional therapy or polysomnography with full night PAP titration.  Suggest optimizing sleep hygiene and avoiding sleep deprivation.  Weight management.    Diagnosis Code(s): Obstructive Sleep Apnea G47.33    Trae Bueno MD, MD, February 14, 2023   Diplomate, American Board of Family Medicine, Sleep Medicine

## 2023-02-24 NOTE — PROGRESS NOTES
Bucky Franco is a 47 year old male who is being evaluated via in-person clinic visit.       Visit Details     In-clinic visit for review of WatchPAT HST results.     Assessment / Plan:    1.)  Mild GEORGE (pAHI 10.8) without sleep-associated hypoxemia.  - Comorbid hypertension, type 2 diabetes, obesity with BMI approximately 39    We discussed that mild GEORGE has been associated with increased risk / prevalence of HTN, though data suggests it is a dose-dependence relationship.    Overall, I feel this level of GEORGE is unlikely to have a significant cardiovascular risk and also unlikely to have significant impact on HTN.    Plan for follow-up as needed, or if significant challenges with recalcitrant HTN.    2.) Severe / Morbid Obesity:   - Counseled on the importance of strict adherence to diet, an exercise routine, and reducing weight.  Weight loss would improve sleep-disordered breathing.  Estimated body mass index is 39.22 kg/m  as calculated from the following:    Height as of 12/20/22: 6' (1.829 m).    Weight as of 12/20/22: 289 lb 3.2 oz (131.2 kg).      SUBJECTIVE:  Bucky Franco is a 47 year old male.    Pertinent PMHx of headaches, morbid obesity status post lap band procedure in 2013 or 2014, gout, DM II, HLD, HTN.     12/27/2022 -he presents today under recommendation from his care team, given comorbid type 2 diabetes and hypertension.     He does not directly identify concern for sleep, but he does feel that he may not get enough sleep and has caused some daytime fatigue.  He lives alone, but is not aware of any snoring or observed apnea.     On workdays he typically goes to bed between 10-10:30 PM, falls asleep within 15 minutes.  He will have 2-3 awakenings, usually to urinate, seems to fall back asleep a reasonable amount of time.  Awakens by alarm or naturally between 5- 5:30 AM.     On weekends, he will go to bed between 11 PM-midnight, and up between 6:54 AM naturally.  He often does nap for an hour or  more on weekend days.     Family history: No known sleep apnea, mother with irregular almost bimodal sleep pattern     Social history: He lives alone, with 2 dogs and 1 cat.  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    A/P for WatchPAT HST.    Today - We reviewed his home sleep test in detail.  His main concern for if GEORGE would have a significant health risk.    WatchPAT - HOME SLEEP STUDY INTERPRETATION     Patient: Bucky Franco  MRN: [id number]  YOB: 1975  Study Date: 2023  Referring Provider: Naomi De Santiago  Ordering Provider: Trae Bueno MD, MD     Chain of custody patient verification was not enabled.       Indications for Home Study: Bucky Franco is a 47 year old male with a history of headaches, morbid obesity status post lap band procedure in  or , gout, DM II, HLD, HTN who presents with symptoms suggestive of obstructive sleep apnea.     Estimated body mass index is 39.22 kg/m  as calculated from the following:    Height as of 22: 1.829 m (6').    Weight as of 22: 131.2 kg (289 lb 3.2 oz).  STOP-BAN/8     Data: A full night home sleep study was performed recording the standard physiologic parameters including peripheral arterial tonometry (PAT), sound/snoring, body position,  movement, sound, and oxygen saturation by pulse oximetry. Pulse rate was estimated by oximetry recording. Sleep staging (wake, REM, light, and deep sleep) was derived from PAT signal.  This study was considered adequate based on > 4 hours of quality oximetry and respiratory recording. As specified by the AASM Manual for the Scoring of Sleep and Associated events, version 2.3, Rule VIII.D 1B, 4% oxygen desaturation scoring for hypopneas is used as a standard of care on all home sleep apnea testing.     Total Recording Time: 7 hrs, 59 min  Total Sleep Time: 6 hrs, 30 min  % of Sleep Time REM:  26.1%     Respiratory:  Snoring: Snoring was present.  Respiratory events: The PAT respiratory disturbance index [pRDI] was 16.7 events per hour.  The PAT apnea/hypopnea index [pAHI] was 10.8 events per hour.  AMEYA was 8.3 events per hour.  During REM sleep the pAHI was 22.4.  Sleep Associated Hypoxemia: sustained hypoxemia was not present. Mean oxygen saturation was 93%.  Minimum was 83%.  Time with saturation less than 88% was 1 minutes.     Heart Rate: By pulse oximetry normal rate was noted.      Position: Percent of time spent: supine - 81.1%, prone - 0%, on right - 18.9%, on left - 0%.  pAHI was 13.1 per hour supine, - per hour prone, 0.8 per hour on right side, and - per hour on left side.      Assessment:   Mild obstructive sleep apnea.  Sleep associated hypoxemia was not present.     Recommendations:  Consider auto-CPAP at 5-15 cmH2O, oral appliance therapy, positional therapy or polysomnography with full night PAP titration.  Suggest optimizing sleep hygiene and avoiding sleep deprivation.  Weight management.     Diagnosis Code(s): Obstructive Sleep Apnea G47.33     Trae Bueno MD, MD, February 14, 2023   Diplomate, American Board of Family Medicine, Sleep Medicine    Past medical history:    Patient Active Problem List    Diagnosis Date Noted     Encounter for diabetic foot exam (H) 10/31/2018     Priority: Medium     Benign essential hypertension 10/31/2018     Priority: Medium     Type 2 diabetes mellitus without complication, without long-term current use of insulin (H) 10/30/2018     Priority: Medium     Morbid obesity (H) 10/11/2018     Priority: Medium     Headache 03/31/2015     Priority: Medium     Problem list name updated by automated process. Provider to review       Sinus headache 03/13/2015     Priority: Medium     Somatic dysfunction of cervical region 03/13/2015     Priority: Medium     Health examination of defined subpopulation 05/03/2013     Priority: Medium     Mixed  hyperlipidemia 05/18/2012     Priority: Medium     Esophageal reflux 05/18/2012     Priority: Medium       10 point ROS of systems including Constitutional, Eyes, Respiratory, Cardiovascular, Gastroenterology, Genitourinary, Integumentary, Muscularskeletal, Psychiatric were all negative except for pertinent positives noted in my HPI.    Current Outpatient Medications   Medication Sig Dispense Refill     aspirin 81 MG tablet Take 1 tablet (81 mg) by mouth daily 90 tablet 3     blood glucose (CONTOUR NEXT TEST) test strip Use to test blood sugar 2 times daily. 100 each 6     blood glucose monitoring ([employer]) lancets Use to test blood sugar 2 times daily. 100 each 6     CARDIZEM  MG 24 hr tablet TAKE 1 TABLET BY MOUTH DAILY ALONG WITH 1-240MG TABLET 90 tablet 3     diltiazem ER (TIAZAC) 240 MG 24 hr ER beaded capsule TAKE 1 CAPSULE BY MOUTH DAILY WITH A 120MG CAPSULE 90 capsule 3     empagliflozin (JARDIANCE) 25 MG TABS tablet Take 1 tablet (25 mg) by mouth daily 90 tablet 1     glimepiride (AMARYL) 2 MG tablet TAKE 2 TABLETS BY MOUTH IN THE MORNING AND 1 TABLET IN THE EVENING WITH MEALS 270 tablet 3     insulin pen needle (32G X 4 MM) 32G X 4 MM miscellaneous Use 1 pen needle daily or as directed. 100 each 6     lisinopril (ZESTRIL) 40 MG tablet TAKE 1 TABLET BY MOUTH DAILY 90 tablet 3     metoprolol succinate ER (TOPROL XL) 25 MG 24 hr tablet Take 1 tablet (25 mg) by mouth daily 90 tablet 3     omeprazole (PRILOSEC) 20 MG DR capsule Take 1 capsule (20 mg) by mouth daily Take 1/2 hour before your first meal of the day 90 capsule 3     OZEMPIC, 2 MG/DOSE, 8 MG/3ML SOPN INJECT 2MG SUBCUTANEOUSLY ONCE WEEKLY 3 mL 3     rosuvastatin (CRESTOR) 10 MG tablet TAKE 1 TABLET BY MOUTH DAILY 90 tablet 3       OBJECTIVE:  There were no vitals taken for this visit.    Physical Exam     ---  This note was written with the assistance of the Dragon voice-dictation technology software. The final document, although  reviewed, may contain errors. For corrections, please contact the office.    Total time spent preparing to see the patient, review of chart, obtaining history and physical examination, review of sleep testing, review of treatment options, education, discussion with patient and documenting in Epic / EMR was 15 minutes.  All time involved was spent on the day of service for the patient (the same day as the patient's appointment).    Trae Bueno MD    Sleep Medicine    [place], [place]  o Main Office: [phone]    [facility] [place], [place]  o [address], [address]  o Schedule visits: [phone]  o Main Office: [phone]  o Fax: [phone]

## 2023-02-27 ENCOUNTER — OFFICE VISIT (OUTPATIENT)
Dept: PULMONOLOGY | Facility: OTHER | Age: 48
End: 2023-02-27
Attending: FAMILY MEDICINE
Payer: COMMERCIAL

## 2023-02-27 DIAGNOSIS — G47.33 OSA (OBSTRUCTIVE SLEEP APNEA): Primary | ICD-10-CM

## 2023-02-27 PROCEDURE — 99213 OFFICE O/P EST LOW 20 MIN: CPT | Performed by: FAMILY MEDICINE

## 2023-02-27 NOTE — PROGRESS NOTES
Sleep Medicine Clinic Rooming Note    Patient was identified appropriately by name and date of birth.    Medication Reconciliation: complete    Chief complaint: study results        Height: 73 ft/inches  Weight: 284 lbs  SpO2: 97  HR: 71  BP: 140/72  Neck circumference: 17  inches     [place] Sleepiness Scale    How likely are you to doze off or fall asleep in the following situations, in contrast to just feeling tired?    This refers to your usual way of life recently.    Even if you haven't done some of these things recently, try to figure out how they would have affected you.    Use the following scale to choose the most appropriate number for each situation:  0 = NO CHANCE of dozing  1 = SLIGHT CHANCE of dozing  2 = MODERATE CHANCE of dozing  3 = HIGH CHANCE of dozing    Sitting and Readin  Watching television: 1  Sitting inactive in a public place:0  Riding in car:0  Laying down to rest in the afternoon:2  Sitting and talking to someone:0  Sitting quietly after lunch without alcohol:1  In a car, stopped in traffic for a few minutes:0    Score: 5    DME needs: -    Additional Notes: -         Low Segment Transverse C/S

## 2023-03-14 DIAGNOSIS — E11.9 TYPE 2 DIABETES MELLITUS WITHOUT COMPLICATION, WITHOUT LONG-TERM CURRENT USE OF INSULIN (H): ICD-10-CM

## 2023-03-14 DIAGNOSIS — E11.65 TYPE 2 DIABETES MELLITUS WITH HYPERGLYCEMIA, WITHOUT LONG-TERM CURRENT USE OF INSULIN (H): ICD-10-CM

## 2023-03-15 NOTE — TELEPHONE ENCOUNTER
ozempic      Last Written Prescription Date:  11/23/22  Last Fill Quantity: 3 ml,   # refills: 3  Last Office Visit: 11/15/22    Future Office visit: crestor      Last Written Prescription Date:  2/22/22  Last Fill Quantity: 90,   # refills: 3  Last Office Visit: 11/15/22  Future Office visit:

## 2023-03-16 ENCOUNTER — TELEPHONE (OUTPATIENT)
Dept: FAMILY MEDICINE | Facility: OTHER | Age: 48
End: 2023-03-16

## 2023-03-16 RX ORDER — SEMAGLUTIDE 2.68 MG/ML
INJECTION, SOLUTION SUBCUTANEOUS
Qty: 3 ML | Refills: 0 | Status: SHIPPED | OUTPATIENT
Start: 2023-03-16 | End: 2023-04-11

## 2023-03-16 RX ORDER — ROSUVASTATIN CALCIUM 10 MG/1
TABLET, COATED ORAL
Qty: 90 TABLET | Refills: 1 | Status: SHIPPED | OUTPATIENT
Start: 2023-03-16 | End: 2023-09-14

## 2023-03-16 NOTE — TELEPHONE ENCOUNTER
LM for patient to return call to reschedule appt that was cancelled on 2/17/23.  Please schedule with PCP when patient returns call.   Thank you.

## 2023-04-11 NOTE — PROGRESS NOTES
Assessment & Plan     Type 2 diabetes mellitus without complication, without long-term current use of insulin (H)  a1c increased to 7.4 from 7.1  - Hemoglobin A1c; Future  - starting short acting metformin  - c/w ozempic 2mg, jardiance, glimepiride   - follows with diabetic education with last visit today and follow up in 6 weeks.     Mixed hyperlipidemia  LDL (8/15/2022) 56  - c/w crestor 10mg     Benign essential hypertension  BP at goal  No change in HTN medications    GEORGE (obstructive sleep apnea) - Mild  Sleep study completed. Does not meet qualifications for CPAP        See Patient Instructions    Return in about 3 months (around 7/17/2023) for Physical Exam, Diabetic Visit, Lab Work.    Naomi De Santiago MD  [facility] - BRITTANY Lawler is a 47 year old, presenting for the following health issues:  Diabetes, Hypertension, and Lipids         View : No data to display.              HPI     Diabetes Follow-up    How often are you checking your blood sugar? Two times daily  Blood sugar testing frequency justification:  Uncontrolled diabetes  What time of day are you checking your blood sugars (select all that apply)?  Before and after meals  Have you had any blood sugars above 200?  Yes. Few more over the 200 vadim  Have you had any blood sugars below 70?  No    What symptoms do you notice when your blood sugar is low?  None    What concerns do you have today about your diabetes? None     Do you have any of these symptoms? (Select all that apply)  No numbness or tingling in feet.  No redness, sores or blisters on feet.  No complaints of excessive thirst.  No reports of blurry vision.  No significant changes to weight.    Have you had a diabetic eye exam in the last 12 months? Yes- Date of last eye exam: 12/14/2022    - restarting metformin (not extended release, pills would swell less) today  - next eye appt in June     Hyperlipidemia Follow-Up      Are you regularly taking any  medication or supplement to lower your cholesterol?   No    Are you having muscle aches or other side effects that you think could be caused by your cholesterol lowering medication?  No    Hypertension Follow-up      Do you check your blood pressure regularly outside of the clinic? No     Are you following a low salt diet? Yes    Are your blood pressures ever more than 140 on the top number (systolic) OR more   than 90 on the bottom number (diastolic), for example 140/90? No    BP Readings from Last 2 Encounters:   04/17/23 123/76   04/17/23 123/76     Hemoglobin A1C (%)   Date Value   04/17/2023 7.4 (H)   12/19/2022 7.1 (H)   05/16/2022 7.5 (A)   11/15/2021 6.9 (A)     LDL Cholesterol Calculated (mg/dL)   Date Value   08/15/2022 56   08/13/2021 55   07/13/2020 58   01/10/2019 50     # GEORGE  - completed sleep study, does not meet qualifications for CPAP     # Social  - mother with stroke ~ 3 years ago. Gets fixated on items. Can no longer drive      Review of Systems   Constitutional: Negative for chills and fever.   HENT: Negative for congestion.    Respiratory: Negative for shortness of breath and wheezing.    Cardiovascular: Negative for chest pain and palpitations.   Gastrointestinal: Negative for abdominal pain.   Psychiatric/Behavioral: Negative for mood changes.          Objective    /76 (BP Location: Left arm, Patient Position: Chair, Cuff Size: Adult Large)   Pulse 61   Temp 98.2  F (36.8  C) (Tympanic)   Resp 16   Wt 133.4 kg (294 lb)   SpO2 96%   BMI 41.29 kg/m    Body mass index is 41.29 kg/m .  Physical Exam  Constitutional:       General: He is not in acute distress.     Appearance: He is not ill-appearing.   Cardiovascular:      Rate and Rhythm: Normal rate and regular rhythm.      Pulses: Normal pulses.      Heart sounds: No murmur heard.  Pulmonary:      Effort: Pulmonary effort is normal. No respiratory distress.      Breath sounds: No wheezing or rales.   Neurological:      Mental  Status: He is alert.   Psychiatric:         Mood and Affect: Mood normal.          Results for orders placed or performed in visit on 04/17/23 (from the past 24 hour(s))   Hemoglobin A1c   Result Value Ref Range    Estimated Average Glucose 166 mg/dL    Hemoglobin A1C 7.4 (H) <5.7 %

## 2023-04-17 ENCOUNTER — LAB (OUTPATIENT)
Dept: LAB | Facility: OTHER | Age: 48
End: 2023-04-17
Attending: FAMILY MEDICINE
Payer: COMMERCIAL

## 2023-04-17 ENCOUNTER — OFFICE VISIT (OUTPATIENT)
Dept: FAMILY MEDICINE | Facility: OTHER | Age: 48
End: 2023-04-17
Attending: FAMILY MEDICINE
Payer: COMMERCIAL

## 2023-04-17 ENCOUNTER — HOSPITAL ENCOUNTER (OUTPATIENT)
Dept: EDUCATION SERVICES | Facility: HOSPITAL | Age: 48
Discharge: HOME | End: 2023-04-17
Attending: NURSE PRACTITIONER
Payer: COMMERCIAL

## 2023-04-17 VITALS
TEMPERATURE: 98.2 F | HEART RATE: 61 BPM | WEIGHT: 294 LBS | OXYGEN SATURATION: 96 % | BODY MASS INDEX: 41.29 KG/M2 | DIASTOLIC BLOOD PRESSURE: 76 MMHG | SYSTOLIC BLOOD PRESSURE: 123 MMHG | RESPIRATION RATE: 16 BRPM

## 2023-04-17 VITALS
WEIGHT: 294.6 LBS | SYSTOLIC BLOOD PRESSURE: 123 MMHG | BODY MASS INDEX: 41.24 KG/M2 | OXYGEN SATURATION: 97 % | HEIGHT: 71 IN | RESPIRATION RATE: 16 BRPM | DIASTOLIC BLOOD PRESSURE: 76 MMHG | HEART RATE: 67 BPM

## 2023-04-17 DIAGNOSIS — E11.9 TYPE 2 DIABETES MELLITUS WITHOUT COMPLICATION, WITHOUT LONG-TERM CURRENT USE OF INSULIN (H): ICD-10-CM

## 2023-04-17 DIAGNOSIS — E11.9 TYPE 2 DIABETES MELLITUS WITHOUT COMPLICATION, WITHOUT LONG-TERM CURRENT USE OF INSULIN (H): Primary | ICD-10-CM

## 2023-04-17 DIAGNOSIS — E78.2 MIXED HYPERLIPIDEMIA: ICD-10-CM

## 2023-04-17 DIAGNOSIS — I10 BENIGN ESSENTIAL HYPERTENSION: ICD-10-CM

## 2023-04-17 DIAGNOSIS — G47.33 OSA (OBSTRUCTIVE SLEEP APNEA): ICD-10-CM

## 2023-04-17 DIAGNOSIS — E11.65 TYPE 2 DIABETES MELLITUS WITH HYPERGLYCEMIA, WITHOUT LONG-TERM CURRENT USE OF INSULIN (H): Primary | ICD-10-CM

## 2023-04-17 LAB
EST. AVERAGE GLUCOSE BLD GHB EST-MCNC: 166 MG/DL
HBA1C MFR BLD: 7.4 %

## 2023-04-17 PROCEDURE — G0108 DIAB MANAGE TRN  PER INDIV: HCPCS | Performed by: DIETITIAN, REGISTERED

## 2023-04-17 PROCEDURE — 83036 HEMOGLOBIN GLYCOSYLATED A1C: CPT

## 2023-04-17 PROCEDURE — 36415 COLL VENOUS BLD VENIPUNCTURE: CPT

## 2023-04-17 PROCEDURE — 99214 OFFICE O/P EST MOD 30 MIN: CPT | Performed by: FAMILY MEDICINE

## 2023-04-17 ASSESSMENT — PAIN SCALES - GENERAL
PAINLEVEL: NO PAIN (0)
PAINLEVEL: NO PAIN (0)

## 2023-04-17 ASSESSMENT — ENCOUNTER SYMPTOMS
CHILLS: 0
ABDOMINAL PAIN: 0
SHORTNESS OF BREATH: 0
FEVER: 0
WHEEZING: 0
PALPITATIONS: 0

## 2023-04-17 NOTE — PATIENT INSTRUCTIONS
-Continue to work on healthy food choices.   -Try to add some routine exercise as weather improves.  -Keep testing glucose 2x/day - fasting and 2 hours after supper meal.  -Target levels are fasting , 2 hours after a meal < 180.  -Keep taking your current diabetes medications.  -Begin Metformin 500 mg daily - add one 500 mg tablet each week unless you have side effects.  Best dose is 1000 mg am/1000 mg evening.  -Follow up in six weeks - bring your meter.  -Call with any concerns - MICHAEL Aviles, [facility] [place] [phone].

## 2023-04-17 NOTE — PROGRESS NOTES
Diabetes Self-Management Education & Support    Presents for: Individual review    Type of Service: In Person Visit    ASSESSMENT:  A1c today was 7.4% which is up from previous A1c of 7.1%.  Pt does make efforts to limit carbohydrates at meals but admits to snacking between on candy and chips at times.  No routine exercise.  Pt has hx of lap band and did not tolerate Metformin XR in past - reported swelling of pills causing nausea and some vomiting.  Willing to try Metformin.     Patient's most recent   Lab Results   Component Value Date    A1C 7.4 04/17/2023    A1C 7.5 05/16/2022     is not meeting goal of <7.0    Diabetes knowledge and skills assessment:   Patient is knowledgeable in diabetes management concepts related to: Healthy Eating, Being Active, Monitoring and Taking Medication    Education today focused on:   Limiting high carbohydrate snacks.  Adding more exercise as weather improves.   Benefits of adding Metformin.     Based on learning assessment above, most appropriate setting for further diabetes education would be: Individual setting.      PLAN  Work on healthy diet and exercise.   Keep testing 2x/day - fasting and 2 hours post supper.  Message sent to provider to add Metformin 500 mg daily and titrate dose.  Awaiting reply.  Pt instructed to call with side effects.     Follow-up: Six weeks.     See Care Plan for co-developed, patient-state behavior change goals.  AVS provided for patient today.    Education Materials Provided:  Provided pt with a new Contour Next Gen meter today as level slightly out of range on control test today.    SUBJECTIVE/OBJECTIVE:  Presents for: Individual review  Accompanied by: Self  Diabetes education in the past 24mo: Yes  Focus of Visit: Assistance w/ making life changes  Diabetes type: Type 2  Date of diagnosis: 2018  Disease course: Getting harder to manage  How confident are you filling out medical forms by yourself:: Extremely  Diabetes management related  "comments/concerns: Glucose levels elevated.  Transportation concerns: No  Difficulty affording diabetes medication?: No  Difficulty affording diabetes testing supplies?: No  Other concerns:: None  Cultural Influences/Ethnic Background:  Not  or     Diabetes Symptoms & Complications:  Fatigue: Sometimes  Neuropathy: Sometimes  Polydipsia: No  Polyphagia: No  Polyuria: Sometimes  Visual change: No  Slow healing wounds: No  Symptom course: Stable  Weight trend: Decreasing (Weight is down 8# over the past year.)  Complications assessed today?: Yes  Autonomic neuropathy: No  CVA: No  Heart disease: No  Nephropathy: No  Peripheral neuropathy: Other  Peripheral Vascular Disease: No  Retinopathy: No  Sexual dysfunction: No    Patient Problem List and Family Medical History reviewed for relevant medical history, current medical status, and diabetes risk factors.    Vitals:  /76   Pulse 67   Resp 16   Ht 1.797 m (5' 10.75\")   Wt 133.6 kg (294 lb 9.6 oz)   SpO2 97%   BMI 41.38 kg/m    Estimated body mass index is 41.38 kg/m  as calculated from the following:    Height as of this encounter: 1.797 m (5' 10.75\").    Weight as of this encounter: 133.6 kg (294 lb 9.6 oz).   Last 3 BP:   BP Readings from Last 3 Encounters:   04/17/23 123/76   04/17/23 123/76   12/20/22 130/72       History   Smoking Status     Never   Smokeless Tobacco     Former     Types: Snuff     Quit date: 10/1/2015       Labs:  Lab Results   Component Value Date    A1C 7.4 04/17/2023    A1C 7.5 05/16/2022     Lab Results   Component Value Date     12/19/2022     08/15/2022     10/14/2020     Lab Results   Component Value Date    LDL 56 08/15/2022    LDL 58 07/13/2020     HDL Cholesterol   Date Value Ref Range Status   07/13/2020 35 (L) >39 mg/dL Final     Direct Measure HDL   Date Value Ref Range Status   08/15/2022 36 (L) >=40 mg/dL Final   ]  GFR Estimate   Date Value Ref Range Status   12/19/2022 >90 >60 " mL/min/1.73m2 Final     Comment:     Effective December 21, 2021 eGFRcr in adults is calculated using the 2021 CKD-EPI creatinine equation which includes age and gender (Reece et al., [place], DOI: 10.1056/DHWXoc3941735)   10/14/2020 >90 >60 mL/min/[1.73_m2] Final     Comment:     Non  GFR Calc  Starting 12/18/2018, serum creatinine based estimated GFR (eGFR) will be   calculated using the Chronic Kidney Disease Epidemiology Collaboration   (CKD-EPI) equation.       GFR Estimate If Black   Date Value Ref Range Status   10/14/2020 >90 >60 mL/min/[1.73_m2] Final     Comment:      GFR Calc  Starting 12/18/2018, serum creatinine based estimated GFR (eGFR) will be   calculated using the Chronic Kidney Disease Epidemiology Collaboration   (CKD-EPI) equation.       Lab Results   Component Value Date    CR 0.91 12/19/2022    CR 0.74 10/14/2020     No results found for: MICROALBUMIN    Healthy Eating:  Healthy Eating Assessed Today: Yes  Cultural/Faith diet restrictions?: No  Meal planning/habits: Avoiding sweets, Low carb, Low salt, Other  How many times a week on average do you eat food made away from home (restaurant/take-out)?: 1  Meals include: Breakfast, Lunch, Dinner, Afternoon Snack  Breakfast: 3-4 eggs and 2-3 slices bob or 1 sausage  Lunch: Beef stew or tacos (3-4 hard shell)  Dinner: meat - occasional veggies - no starch  Snacks: Afternoon- handful pringles  HS-no snacks  Other: Has been eating more candy in last three weeks r/t job stress.  Beverages: Water, Milk, Diet soda, Alcohol, Other ([employer] [place] [employer] 2x/month, light beer occasionally)  Has patient met with a dietitian in the past?: Yes    Being Active:  Being Active Assessed Today: Yes  Exercise:: Currently not exercising  Barrier to exercise: None    Monitoring:  Monitoring Assessed Today: Yes  Did patient bring glucose meter to appointment? : Yes  Blood Glucose Meter: ContourNext  Times checking blood sugar at home  (number): 2  Times checking blood sugar at home (per): Day  Blood glucose trend: Fluctuating  Fasting-192, 163, 154, 162, 198, 160, 171, 206, 154, 187, 151, 177, 167, 183  2 hours post supper-242, 256, 140, 244, 220, 214, 178, 222    Taking Medications:  Diabetes Medication(s)     Sodium-Glucose Co-Transporter 2 (SGLT2) Inhibitors       empagliflozin (JARDIANCE) 25 MG TABS tablet    Take 1 tablet (25 mg) by mouth daily    Sulfonylureas       glimepiride (AMARYL) 2 MG tablet    TAKE 2 TABLETS BY MOUTH IN THE MORNING AND 1 TABLET IN THE EVENING WITH MEALS    Incretin Mimetic Agents       OZEMPIC, 2 MG/DOSE, 8 MG/3ML pen    INJECT 2MG SUBCUTANEOUSLY ONCE WEEKLY          Taking Medication Assessed Today: Yes  Current Treatments: Non-insulin Injectables, Oral Medication (taken by mouth) (Jardiance 25 mg daily, Glimepiride 4 mg am/2 gm evening, Ozempic 2 mg weekly.)  Problems taking diabetes medications regularly?: No  Diabetes medication side effects?: No    Problem Solving:  Problem Solving Assessed Today: Yes  Is the patient at risk for hypoglycemia?: Yes  Hypoglycemia Frequency: Never  Is the patient at risk for DKA?: No    Reducing Risks:  Reducing Risks Assessed Today: Yes  Diabetes Risks: Sedentary Lifestyle, Family History  CAD Risks: Diabetes Mellitus, Hypertension, Obesity, Sedentary lifestyle  Has dilated eye exam at least once a year?: Yes  Sees dentist every 6 months?: Yes  Feet checked by healthcare provider in the last year?: Yes    Healthy Coping:  Healthy Coping Assessed Today: Yes  Emotional response to diabetes: Concern for health and well-being, Ready to learn, Acceptance  Informal Support system:: Family  Stage of change: MAINTENANCE (Working to maintain change, with risk of relapse)  Patient Activation Measure Survey Score:      10/11/2018     1:00 PM   TED Score (Last Two)   TED Raw Score 30   Activation Score 56   TED Level 3     Care Plan and Education Provided:  Care Plan: Diabetes   Updates made  by Harmony Bird RD since 4/17/2023 12:00 AM      Problem: Diabetes Self-Management Education Needed to Optimize Self-Care Behaviors       Goal: Healthy Eating - follow a healthy eating pattern for diabetes       Task: Provide education on managing carbohydrate intake (carbohydrate counting, plate planning method, etc.) Completed 4/17/2023   Responsible User: Harmony Bird RD      Goal: Being Active - get regular physical activity, working up to at least 150 minutes per week       Task: Provide education on relationship of activity to glucose and precautions to take if at risk for low glucose Completed 4/17/2023   Responsible User: Harmony Bird RD      Goal: Monitoring - monitor glucose and ketones as directed Completed 4/17/2023   Start Date: 11/30/2022   Recent Progress: 0%   Note:    Pt will wear CGM for study to help determine if medication changes are indicated with increasing/fluctuating glucose levels.       Task: Provide education on blood glucose monitoring (purpose, proper technique, frequency, glucose targets, interpreting results, when to use glucose control solution, sharps disposal) Completed 4/17/2023   Responsible User: Harmony Bird RD      Task: Provide education on continuous glucose monitoring (sensor placement, use of massimo or /reader, understanding glucose trends, alerts and alarms, differences between sensor glucose and blood glucose) Completed 4/17/2023   Responsible User: Harmony Bird RD      Task: Provide education on ketone monitoring (when to monitor, frequency, etc.) Completed 4/17/2023   Responsible User: Harmony Bird RD      Goal: Reducing Risks - know how to prevent and treat long-term diabetes complications       Task: Provide education on Hemoglobin A1c - goals and relationship to blood glucose levels Completed 4/17/2023   Responsible User: Harmony Bird RD          Time Spent: 45 minutes  Encounter Type: Individual    Any diabetes medication dose changes were made  via the CDE Protocol per the patient's referring provider. A copy of this encounter was shared with the provider.

## 2023-04-17 NOTE — LETTER
4/17/2023        RE: Bucky Franco  [address] [place] [address]        Diabetes Self-Management Education & Support    Presents for: Individual review    Type of Service: In Person Visit    ASSESSMENT:  A1c today was 7.4% which is up from previous A1c of 7.1%.  Pt does make efforts to limit carbohydrates at meals but admits to snacking between on candy and chips at times.  No routine exercise.  Pt has hx of lap band and did not tolerate Metformin XR in past - reported swelling of pills causing nausea and some vomiting.  Willing to try Metformin.     Patient's most recent   Lab Results   Component Value Date    A1C 7.4 04/17/2023    A1C 7.5 05/16/2022     is not meeting goal of <7.0    Diabetes knowledge and skills assessment:   Patient is knowledgeable in diabetes management concepts related to: Healthy Eating, Being Active, Monitoring and Taking Medication    Education today focused on:   Limiting high carbohydrate snacks.  Adding more exercise as weather improves.   Benefits of adding Metformin.     Based on learning assessment above, most appropriate setting for further diabetes education would be: Individual setting.      PLAN  Work on healthy diet and exercise.   Keep testing 2x/day - fasting and 2 hours post supper.  Message sent to provider to add Metformin 500 mg daily and titrate dose.  Awaiting reply.  Pt instructed to call with side effects.     Follow-up: Six weeks.     See Care Plan for co-developed, patient-state behavior change goals.  AVS provided for patient today.    Education Materials Provided:  No new materials today.     SUBJECTIVE/OBJECTIVE:  Presents for: Individual review  Accompanied by: Self  Diabetes education in the past 24mo: Yes  Focus of Visit: Assistance w/ making life changes  Diabetes type: Type 2  Date of diagnosis: 2018  Disease course: Getting harder to manage  How confident are you filling out medical forms by yourself:: Extremely  Diabetes management related  "comments/concerns: Glucose levels elevated.  Transportation concerns: No  Difficulty affording diabetes medication?: No  Difficulty affording diabetes testing supplies?: No  Other concerns:: None  Cultural Influences/Ethnic Background:  Not  or     Diabetes Symptoms & Complications:  Fatigue: Sometimes  Neuropathy: Sometimes  Polydipsia: No  Polyphagia: No  Polyuria: Sometimes  Visual change: No  Slow healing wounds: No  Symptom course: Stable  Weight trend: Decreasing (Weight is down 8# over the past year.)  Complications assessed today?: Yes  Autonomic neuropathy: No  CVA: No  Heart disease: No  Nephropathy: No  Peripheral neuropathy: Other  Peripheral Vascular Disease: No  Retinopathy: No  Sexual dysfunction: No    Patient Problem List and Family Medical History reviewed for relevant medical history, current medical status, and diabetes risk factors.    Vitals:  /76   Pulse 67   Resp 16   Ht 1.797 m (5' 10.75\")   Wt 133.6 kg (294 lb 9.6 oz)   SpO2 97%   BMI 41.38 kg/m    Estimated body mass index is 41.38 kg/m  as calculated from the following:    Height as of this encounter: 1.797 m (5' 10.75\").    Weight as of this encounter: 133.6 kg (294 lb 9.6 oz).   Last 3 BP:   BP Readings from Last 3 Encounters:   04/17/23 123/76   04/17/23 123/76   12/20/22 130/72       History   Smoking Status     Never   Smokeless Tobacco     Former     Types: Snuff     Quit date: 10/1/2015       Labs:  Lab Results   Component Value Date    A1C 7.4 04/17/2023    A1C 7.5 05/16/2022     Lab Results   Component Value Date     12/19/2022     08/15/2022     10/14/2020     Lab Results   Component Value Date    LDL 56 08/15/2022    LDL 58 07/13/2020     HDL Cholesterol   Date Value Ref Range Status   07/13/2020 35 (L) >39 mg/dL Final     Direct Measure HDL   Date Value Ref Range Status   08/15/2022 36 (L) >=40 mg/dL Final   ]  GFR Estimate   Date Value Ref Range Status   12/19/2022 >90 >60 " mL/min/1.73m2 Final     Comment:     Effective December 21, 2021 eGFRcr in adults is calculated using the 2021 CKD-EPI creatinine equation which includes age and gender (Reece et al., [place], DOI: 10.1056/DBDJac6489462)   10/14/2020 >90 >60 mL/min/[1.73_m2] Final     Comment:     Non  GFR Calc  Starting 12/18/2018, serum creatinine based estimated GFR (eGFR) will be   calculated using the Chronic Kidney Disease Epidemiology Collaboration   (CKD-EPI) equation.       GFR Estimate If Black   Date Value Ref Range Status   10/14/2020 >90 >60 mL/min/[1.73_m2] Final     Comment:      GFR Calc  Starting 12/18/2018, serum creatinine based estimated GFR (eGFR) will be   calculated using the Chronic Kidney Disease Epidemiology Collaboration   (CKD-EPI) equation.       Lab Results   Component Value Date    CR 0.91 12/19/2022    CR 0.74 10/14/2020     No results found for: MICROALBUMIN    Healthy Eating:  Healthy Eating Assessed Today: Yes  Cultural/Yazidism diet restrictions?: No  Meal planning/habits: Avoiding sweets, Low carb, Low salt, Other  How many times a week on average do you eat food made away from home (restaurant/take-out)?: 1  Meals include: Breakfast, Lunch, Dinner, Afternoon Snack  Breakfast: 3-4 eggs and 2-3 slices bob or 1 sausage  Lunch: Beef stew or tacos (3-4 hard shell)  Dinner: meat - occasional veggies - no starch  Snacks: Afternoon- handful pringles  HS-no snacks  Other: Has been eating more candy in last three weeks r/t job stress.  Beverages: Water, Milk, Diet soda, Alcohol, Other ([employer] 2x/month, light beer occasionally)  Has patient met with a dietitian in the past?: Yes    Being Active:  Being Active Assessed Today: Yes  Exercise:: Currently not exercising  Barrier to exercise: None    Monitoring:  Monitoring Assessed Today: Yes  Did patient bring glucose meter to appointment? : Yes  Blood Glucose Meter: ContourNext  Times checking blood sugar at home  (number): 2  Times checking blood sugar at home (per): Day  Blood glucose trend: Fluctuating  Fasting-192, 163, 154, 162, 198, 160, 171, 206, 154, 187, 151, 177, 167, 183  2 hours post supper-242, 256, 140, 244, 220, 214, 178, 222    Taking Medications:  Diabetes Medication(s)     Sodium-Glucose Co-Transporter 2 (SGLT2) Inhibitors       empagliflozin (JARDIANCE) 25 MG TABS tablet    Take 1 tablet (25 mg) by mouth daily    Sulfonylureas       glimepiride (AMARYL) 2 MG tablet    TAKE 2 TABLETS BY MOUTH IN THE MORNING AND 1 TABLET IN THE EVENING WITH MEALS    Incretin Mimetic Agents       OZEMPIC, 2 MG/DOSE, 8 MG/3ML pen    INJECT 2MG SUBCUTANEOUSLY ONCE WEEKLY          Taking Medication Assessed Today: Yes  Current Treatments: Non-insulin Injectables, Oral Medication (taken by mouth) (Jardiance 25 mg daily, Glimepiride 4 mg am/2 gm evening, Ozempic 2 mg weekly.)  Problems taking diabetes medications regularly?: No  Diabetes medication side effects?: No    Problem Solving:  Problem Solving Assessed Today: Yes  Is the patient at risk for hypoglycemia?: Yes  Hypoglycemia Frequency: Never  Is the patient at risk for DKA?: No    Reducing Risks:  Reducing Risks Assessed Today: Yes  Diabetes Risks: Sedentary Lifestyle, Family History  CAD Risks: Diabetes Mellitus, Hypertension, Obesity, Sedentary lifestyle  Has dilated eye exam at least once a year?: Yes  Sees dentist every 6 months?: Yes  Feet checked by healthcare provider in the last year?: Yes    Healthy Coping:  Healthy Coping Assessed Today: Yes  Emotional response to diabetes: Concern for health and well-being, Ready to learn, Acceptance  Informal Support system:: Family  Stage of change: MAINTENANCE (Working to maintain change, with risk of relapse)  Patient Activation Measure Survey Score:      10/11/2018     1:00 PM   TED Score (Last Two)   TED Raw Score 30   Activation Score 56   TED Level 3     Care Plan and Education Provided:  Care Plan: Diabetes   Updates made  by Harmony Bird RD since 4/17/2023 12:00 AM      Problem: Diabetes Self-Management Education Needed to Optimize Self-Care Behaviors       Goal: Healthy Eating - follow a healthy eating pattern for diabetes       Task: Provide education on managing carbohydrate intake (carbohydrate counting, plate planning method, etc.) Completed 4/17/2023   Responsible User: Harmony Bird RD      Goal: Being Active - get regular physical activity, working up to at least 150 minutes per week       Task: Provide education on relationship of activity to glucose and precautions to take if at risk for low glucose Completed 4/17/2023   Responsible User: Harmony Bird RD      Goal: Monitoring - monitor glucose and ketones as directed Completed 4/17/2023   Start Date: 11/30/2022   Recent Progress: 0%   Note:    Pt will wear CGM for study to help determine if medication changes are indicated with increasing/fluctuating glucose levels.       Task: Provide education on blood glucose monitoring (purpose, proper technique, frequency, glucose targets, interpreting results, when to use glucose control solution, sharps disposal) Completed 4/17/2023   Responsible User: Harmony Bird RD      Task: Provide education on continuous glucose monitoring (sensor placement, use of massimo or /reader, understanding glucose trends, alerts and alarms, differences between sensor glucose and blood glucose) Completed 4/17/2023   Responsible User: Harmony Bird RD      Task: Provide education on ketone monitoring (when to monitor, frequency, etc.) Completed 4/17/2023   Responsible User: Harmony Bird RD      Goal: Reducing Risks - know how to prevent and treat long-term diabetes complications       Task: Provide education on Hemoglobin A1c - goals and relationship to blood glucose levels Completed 4/17/2023   Responsible User: Harmony Bird RD          Time Spent: 45 minutes  Encounter Type: Individual    Any diabetes medication dose changes were made  via the CDE Protocol per the patient's referring provider. A copy of this encounter was shared with the provider.      Sincerely,        Harmony Bird RD

## 2023-05-19 ENCOUNTER — TELEPHONE (OUTPATIENT)
Dept: EDUCATION SERVICES | Facility: OTHER | Age: 48
End: 2023-05-19

## 2023-06-05 ENCOUNTER — TRANSFERRED RECORDS (OUTPATIENT)
Dept: HEALTH INFORMATION MANAGEMENT | Facility: CLINIC | Age: 48
End: 2023-06-05

## 2023-06-05 LAB — RETINOPATHY: POSITIVE

## 2023-06-12 DIAGNOSIS — E11.9 TYPE 2 DIABETES MELLITUS WITHOUT COMPLICATION, WITHOUT LONG-TERM CURRENT USE OF INSULIN (H): ICD-10-CM

## 2023-06-13 RX ORDER — EMPAGLIFLOZIN 25 MG/1
TABLET, FILM COATED ORAL
Qty: 90 TABLET | Refills: 0 | Status: SHIPPED | OUTPATIENT
Start: 2023-06-13 | End: 2023-09-14

## 2023-06-13 NOTE — TELEPHONE ENCOUNTER
empagliflozin (JARDIANCE) 25 MG TABS tablet 90 tablet 1 11/15/2022   Last Office Visit: 04/17/23  Future Office visit:    Next 5 appointments (look out 90 days)    Jul 20, 2023  8:00 AM  (Arrive by 7:45 AM)  SHORT with Naomi De Santiago MD  [facility] [place] ([facility] [place] ) [address]  [place] [address]  [phone]           Routing refill request to provider for review/approval because:

## 2023-07-19 NOTE — PROGRESS NOTES
Assessment & Plan     Type 2 diabetes mellitus without complication, without long-term current use of insulin (H)  A1c steady at 7.3. No change in diabetic medications at this time. Follow up with diabetic education as scheduled  - Hemoglobin A1c; Future  - metFORMIN (GLUCOPHAGE) 500 MG tablet; Take 2 tablets (1,000 mg) by mouth 2 times daily (with meals)  - c/w metformin 1000/1000  - c/w glimepiride 4mg/ 2mg  - c/w jardiance 25mg   - c/w ozempic 2mg qwk  - follows with diabetic education, Harmony, with next visit 7/27/2023    Benign essential hypertension  BP at goal  No change in HTN medications    Mixed hyperlipidemia  Due for lipid panel at next visit  - c/w ASA 81mg  - c/w atorvastatin       See Patient Instructions    Return for Diabetic Visit, Lab Work, Physical Exam.    Naomi De Santiago MD  [facility] [place]ITT[place] Lawler is a 48 year old, presenting for the following health issues:  Diabetes, Lipids, and Hypertension    HPI     Diabetes Follow-up    How often are you checking your blood sugar? Two times daily  Blood sugar testing frequency justification:  Uncontrolled diabetes  What time of day are you checking your blood sugars (select all that apply)?  Before meals and At bedtime  Have you had any blood sugars above 200?  Yes   Have you had any blood sugars below 70?  No    What symptoms do you notice when your blood sugar is low?  None    What concerns do you have today about your diabetes? None     Do you have any of these symptoms? (Select all that apply)  No numbness or tingling in feet.  No redness, sores or blisters on feet.  No complaints of excessive thirst.  No reports of blurry vision.  No significant changes to weight.    - metformin 1000/1000, currently taking  - ozempic 2mg, tolerating without issues   - BG are consistently higher, higher stress level   - BG morning 120 to 160s     Hyperlipidemia Follow-Up      Are you regularly taking any medication or  supplement to lower your cholesterol?   Yes- Crestor 10 mg    Are you having muscle aches or other side effects that you think could be caused by your cholesterol lowering medication?  No    Hypertension Follow-up      Do you check your blood pressure regularly outside of the clinic? No     Are you following a low salt diet? Yes    Are your blood pressures ever more than 140 on the top number (systolic) OR more   than 90 on the bottom number (diastolic), for example 140/90? No    BP Readings from Last 2 Encounters:   07/20/23 138/76   04/17/23 123/76     Hemoglobin A1C (%)   Date Value   04/17/2023 7.4 (H)   12/19/2022 7.1 (H)   05/16/2022 7.5 (A)   11/15/2021 6.9 (A)     LDL Cholesterol Calculated (mg/dL)   Date Value   08/15/2022 56   08/13/2021 55   07/13/2020 58   01/10/2019 50       # Colon cancer screening ** changing insurance     # Social   - work is stressful, working many hours. Company was just bought out       Review of Systems   Constitutional: Positive for fatigue (working many hours). Negative for chills and fever.   HENT: Negative for congestion.    Respiratory: Negative for shortness of breath and wheezing.    Cardiovascular: Negative for chest pain and palpitations.   Gastrointestinal: Negative for abdominal pain.          Objective    /76   Pulse 70   Temp 98.7  F (37.1  C) (Tympanic)   Resp 18   Wt 135.4 kg (298 lb 6.4 oz)   SpO2 96%   BMI 41.91 kg/m    Body mass index is 41.91 kg/m .  Physical Exam  Constitutional:       General: He is not in acute distress.     Appearance: He is not ill-appearing.   Cardiovascular:      Rate and Rhythm: Normal rate and regular rhythm.      Pulses: Normal pulses.      Heart sounds: No murmur heard.  Pulmonary:      Effort: Pulmonary effort is normal. No respiratory distress.      Breath sounds: No wheezing or rales.   Musculoskeletal:      Right lower leg: No edema.      Left lower leg: No edema.   Neurological:      Mental Status: He is alert.    Psychiatric:         Mood and Affect: Mood normal.          Results for orders placed or performed in visit on 07/20/23 (from the past 24 hour(s))   Hemoglobin A1c   Result Value Ref Range    Estimated Average Glucose 163 mg/dL    Hemoglobin A1C 7.3 (H) <5.7 %

## 2023-07-20 ENCOUNTER — LAB (OUTPATIENT)
Dept: LAB | Facility: OTHER | Age: 48
End: 2023-07-20
Attending: FAMILY MEDICINE
Payer: COMMERCIAL

## 2023-07-20 ENCOUNTER — OFFICE VISIT (OUTPATIENT)
Dept: FAMILY MEDICINE | Facility: OTHER | Age: 48
End: 2023-07-20
Attending: FAMILY MEDICINE
Payer: COMMERCIAL

## 2023-07-20 VITALS
DIASTOLIC BLOOD PRESSURE: 76 MMHG | OXYGEN SATURATION: 96 % | TEMPERATURE: 98.7 F | SYSTOLIC BLOOD PRESSURE: 138 MMHG | WEIGHT: 298.4 LBS | BODY MASS INDEX: 41.91 KG/M2 | HEART RATE: 70 BPM | RESPIRATION RATE: 18 BRPM

## 2023-07-20 DIAGNOSIS — E11.9 TYPE 2 DIABETES MELLITUS WITHOUT COMPLICATION, WITHOUT LONG-TERM CURRENT USE OF INSULIN (H): ICD-10-CM

## 2023-07-20 DIAGNOSIS — E11.9 TYPE 2 DIABETES MELLITUS WITHOUT COMPLICATION, WITHOUT LONG-TERM CURRENT USE OF INSULIN (H): Primary | ICD-10-CM

## 2023-07-20 DIAGNOSIS — E78.2 MIXED HYPERLIPIDEMIA: ICD-10-CM

## 2023-07-20 DIAGNOSIS — I10 BENIGN ESSENTIAL HYPERTENSION: ICD-10-CM

## 2023-07-20 LAB
EST. AVERAGE GLUCOSE BLD GHB EST-MCNC: 163 MG/DL
HBA1C MFR BLD: 7.3 %

## 2023-07-20 PROCEDURE — 83036 HEMOGLOBIN GLYCOSYLATED A1C: CPT

## 2023-07-20 PROCEDURE — 36415 COLL VENOUS BLD VENIPUNCTURE: CPT

## 2023-07-20 PROCEDURE — 99214 OFFICE O/P EST MOD 30 MIN: CPT | Performed by: FAMILY MEDICINE

## 2023-07-20 ASSESSMENT — ENCOUNTER SYMPTOMS
SHORTNESS OF BREATH: 0
FEVER: 0
WHEEZING: 0
PALPITATIONS: 0
CHILLS: 0
ABDOMINAL PAIN: 0
FATIGUE: 1

## 2023-07-20 ASSESSMENT — PAIN SCALES - GENERAL: PAINLEVEL: NO PAIN (0)

## 2023-07-27 ENCOUNTER — HOSPITAL ENCOUNTER (OUTPATIENT)
Dept: EDUCATION SERVICES | Facility: HOSPITAL | Age: 48
Discharge: HOME | End: 2023-07-27
Attending: DIETITIAN, REGISTERED | Admitting: FAMILY MEDICINE
Payer: COMMERCIAL

## 2023-07-27 VITALS
OXYGEN SATURATION: 96 % | HEIGHT: 71 IN | WEIGHT: 296.5 LBS | HEART RATE: 70 BPM | BODY MASS INDEX: 41.51 KG/M2 | DIASTOLIC BLOOD PRESSURE: 60 MMHG | SYSTOLIC BLOOD PRESSURE: 150 MMHG

## 2023-07-27 DIAGNOSIS — E11.65 TYPE 2 DIABETES MELLITUS WITH HYPERGLYCEMIA, WITHOUT LONG-TERM CURRENT USE OF INSULIN (H): Primary | ICD-10-CM

## 2023-07-27 PROCEDURE — G0108 DIAB MANAGE TRN  PER INDIV: HCPCS | Performed by: DIETITIAN, REGISTERED

## 2023-07-27 RX ORDER — BLOOD-GLUCOSE SENSOR
1 EACH MISCELLANEOUS
Qty: 2 EACH | Refills: 5 | Status: SHIPPED | OUTPATIENT
Start: 2023-07-27 | End: 2024-01-09

## 2023-07-27 ASSESSMENT — PAIN SCALES - GENERAL: PAINLEVEL: NO PAIN (0)

## 2023-07-27 NOTE — PROGRESS NOTES
Diabetes Self-Management Education & Support    Presents for: Individual review    Type of Service: In Person Visit    ASSESSMENT:  Pt is here today with concerns that glucose levels are higher than in the past and he has not changed diet or exercise routine.  He does report increase in job stress.  Recent A1c was 7.3% with previous 7.4%.  He is on maximum doses of current medications other than Glimepiride which could be increased by 2 mg.      Patient's most recent   Lab Results   Component Value Date    A1C 7.3 07/20/2023    A1C 7.5 05/16/2022     is not meeting goal of <7.0    Diabetes knowledge and skills assessment:   Patient is knowledgeable in diabetes management concepts related to: Healthy Eating, Being Active, Monitoring, Taking Medication, Problem Solving, Reducing Risks, and Healthy Coping    Continue education with the following diabetes management concepts: CGM use to better see how food/activity is effecting glucose levels throughout the day.  Reviewed Freestyle Yonis vs Dexcom.  Discussed use, trend arrows, times when sensor would need removal, available phone massimo.     Based on learning assessment above, most appropriate setting for further diabetes education would be: Individual setting.      PLAN  Freestyle Yonis 3 ordered via [employer].  Pt will obtain E-Voucher Code and pay cash - 75.00 per month.    Pt will call if he needs help with set up.  Email sent to share data.     Follow-up: Will download data in approximately 2 weeks for review.      See Care Plan for co-developed, patient-state behavior change goals.  AVS provided for patient today.    Education Materials Provided:  Freestyle Yonis 3 information  Skin Tac sample  Overlay patch for Freestyle Yonis 3    SUBJECTIVE/OBJECTIVE:  Presents for: Individual review  Accompanied by: Self  Diabetes education in the past 24mo: Yes  Focus of Visit: CGM  Diabetes type: Type 2  Date of diagnosis: 2018  Disease course: Getting harder to manage  How  "confident are you filling out medical forms by yourself:: Extremely  Diabetes management related comments/concerns: Glucose levels higher than previous.  Transportation concerns: No  Difficulty affording diabetes medication?: No  Difficulty affording diabetes testing supplies?: No  Other concerns:: None  Cultural Influences/Ethnic Background:  Not  or     Diabetes Symptoms & Complications:  Fatigue: Yes  Neuropathy: Sometimes  Polydipsia: No  Polyphagia: No  Polyuria: No  Visual change: Sometimes  Slow healing wounds: No  Symptom course: Stable  Weight trend: Stable  Complications assessed today?: Yes  Autonomic neuropathy: No  CVA: No  Heart disease: No  Nephropathy: No  Peripheral neuropathy: No  Peripheral Vascular Disease: No  Retinopathy: No  Sexual dysfunction: No    Patient Problem List and Family Medical History reviewed for relevant medical history, current medical status, and diabetes risk factors.    Vitals:  /60   Pulse 70   Ht 1.797 m (5' 10.75\")   Wt 134.5 kg (296 lb 8 oz)   SpO2 96%   BMI 41.65 kg/m    Estimated body mass index is 41.65 kg/m  as calculated from the following:    Height as of this encounter: 1.797 m (5' 10.75\").    Weight as of this encounter: 134.5 kg (296 lb 8 oz).   Last 3 BP:   BP Readings from Last 3 Encounters:   07/27/23 150/60   07/20/23 138/76   04/17/23 123/76       History   Smoking Status    Never   Smokeless Tobacco    Former    Types: Snuff    Quit date: 10/1/2015       Labs:  Lab Results   Component Value Date    A1C 7.3 07/20/2023    A1C 7.5 05/16/2022     Lab Results   Component Value Date     12/19/2022     08/15/2022     10/14/2020     Lab Results   Component Value Date    LDL 56 08/15/2022    LDL 58 07/13/2020     HDL Cholesterol   Date Value Ref Range Status   07/13/2020 35 (L) >39 mg/dL Final     Direct Measure HDL   Date Value Ref Range Status   08/15/2022 36 (L) >=40 mg/dL Final   ]  GFR Estimate   Date Value Ref " Range Status   12/19/2022 >90 >60 mL/min/1.73m2 Final     Comment:     Effective December 21, 2021 eGFRcr in adults is calculated using the 2021 CKD-EPI creatinine equation which includes age and gender (Reece garcia al., NEJM, DOI: 10.1056/AIDPwi6276684)   10/14/2020 >90 >60 mL/min/[1.73_m2] Final     Comment:     Non  GFR Calc  Starting 12/18/2018, serum creatinine based estimated GFR (eGFR) will be   calculated using the Chronic Kidney Disease Epidemiology Collaboration   (CKD-EPI) equation.       GFR Estimate If Black   Date Value Ref Range Status   10/14/2020 >90 >60 mL/min/[1.73_m2] Final     Comment:      GFR Calc  Starting 12/18/2018, serum creatinine based estimated GFR (eGFR) will be   calculated using the Chronic Kidney Disease Epidemiology Collaboration   (CKD-EPI) equation.       Lab Results   Component Value Date    CR 0.91 12/19/2022    CR 0.74 10/14/2020     No results found for: MICROALBUMIN    Healthy Eating:  Healthy Eating Assessed Today: Yes  Cultural/Sikh diet restrictions?: No  Meal planning/habits: None, Avoiding sweets, Low salt  How many times a week on average do you eat food made away from home (restaurant/take-out)?: 3  Meals include: Breakfast, Lunch, Dinner, Morning Snack  Breakfast: 3-4 eggs, 2-3 slices bob or 1 sausage  Lunch: Lasagna made from only meat/cheese (no noodles) or chicken dumpling dish  Dinner: meat, maybe 1-2 slices bread  Snacks: carrots/dip or chips  Beverages: Water, Coffee, Milk, Diet soda, Sports drinks, Energy drinks, Alcohol  Has patient met with a dietitian in the past?: Yes    Being Active:  Being Active Assessed Today: Yes  Exercise:: Currently not exercising  Barrier to exercise: None, Time    Monitoring:  Monitoring Assessed Today: Yes  Did patient bring glucose meter to appointment? : Yes  Blood Glucose Meter: ContourNext  Times checking blood sugar at home (number): 2  Times checking blood sugar at home (per): Day  Blood  glucose trend: Fluctuating  Fasting-142, 156, 156, 162, 176, 160, 166, 178, 167, 159, 151, 142, 157  2 hours post supper-186, 224, 209, 293, 176, 180, 252, 145  Two week average is 180.    Taking Medications:  Diabetes Medication(s)       Biguanides       metFORMIN (GLUCOPHAGE) 500 MG tablet    Take 2 tablets (1,000 mg) by mouth 2 times daily (with meals)      Sodium-Glucose Co-Transporter 2 (SGLT2) Inhibitors       JARDIANCE 25 MG TABS tablet    TAKE 1 TABLET BY MOUTH DAILY      Sulfonylureas       glimepiride (AMARYL) 2 MG tablet    TAKE 2 TABLETS BY MOUTH IN THE MORNING AND 1 TABLET IN THE EVENING WITH MEALS      Incretin Mimetic Agents       OZEMPIC, 2 MG/DOSE, 8 MG/3ML pen    INJECT 2MG SUBCUTANEOUSLY ONCE WEEKLY            Taking Medication Assessed Today: Yes  Current Treatments: Non-insulin Injectables, Oral Medication (taken by mouth) ((Metformin 1000 mg bid, Jardiance 25 mg daily, Glimepiride 4 mg am/2 gm evening, Ozempic 2 mg weekly.))  Problems taking diabetes medications regularly?: No  Diabetes medication side effects?: No    Problem Solving:  Problem Solving Assessed Today: Yes  Is the patient at risk for hypoglycemia?: Yes  Hypoglycemia Frequency: Never  Does patient have glucagon emergency kit?: No  Is the patient at risk for DKA?: No    Reducing Risks:  Reducing Risks Assessed Today: Yes  Diabetes Risks: Age over 45 years  CAD Risks: Diabetes Mellitus, Dyslipidemia, Family history, Hypertension, Obesity, Sedentary lifestyle, Stress  Has dilated eye exam at least once a year?: Yes  Sees dentist every 6 months?: Yes  Feet checked by healthcare provider in the last year?: Yes    Healthy Coping:  Healthy Coping Assessed Today: Yes  Emotional response to diabetes: Acceptance, Concern for health and well-being  Informal Support system:: None  Stage of change: MAINTENANCE (Working to maintain change, with risk of relapse)  Patient Activation Measure Survey Score:      10/11/2018     1:00 PM   TED Score  (Last Two)   TED Raw Score 30   Activation Score 56   TED Level 3       Time Spent: 50 minutes  Encounter Type: Individual    Any diabetes medication dose changes were made via the CDE Protocol per the patient's referring provider. A copy of this encounter was shared with the provider.

## 2023-07-27 NOTE — PATIENT INSTRUCTIONS
"-Call [employer] and ask for \"E-Voucher Code\".  Bring code to pharmacy and you will be able to purchase two sensors for $ 75.00.  -Watch for email from [employer] to share your data once you have your Yonis set up.  -Call if you need help with set up.  -I will plan on downloading your data in about two weeks for review and I will My Chart you feedback.  -MICHAEL Aviles, [facility] [place] [phone].   "

## 2023-07-27 NOTE — LETTER
7/27/2023        RE: Bucky Franco  [address]  [place] [address]        Diabetes Self-Management Education & Support    Presents for: Individual review    Type of Service: In Person Visit    ASSESSMENT:  Pt is here today with concerns that glucose levels are higher than in the past and he has not changed diet or exercise routine.  He does report increase in job stress.  Recent A1c was 7.3% with previous 7.4%.  He is on maximum doses of current medications other than Glimepiride which could be increased by 2 mg.      Patient's most recent   Lab Results   Component Value Date    A1C 7.3 07/20/2023    A1C 7.5 05/16/2022     is not meeting goal of <7.0    Diabetes knowledge and skills assessment:   Patient is knowledgeable in diabetes management concepts related to: Healthy Eating, Being Active, Monitoring, Taking Medication, Problem Solving, Reducing Risks, and Healthy Coping    Continue education with the following diabetes management concepts: CGM use to better see how food/activity is effecting glucose levels throughout the day.  Reviewed Freestyle Yonis vs Dexcom.  Discussed use, trend arrows, times when sensor would need removal, available phone massimo.     Based on learning assessment above, most appropriate setting for further diabetes education would be: Individual setting.      PLAN  Freestyle Yonis 3 ordered via [employer].  Pt will obtain E-Voucher Code and pay cash - 75.00 per month.    Pt will call if he needs help with set up.  Email sent to share data.     Follow-up: Will download data in approximately 2 weeks for review.      See Care Plan for co-developed, patient-state behavior change goals.  AVS provided for patient today.    Education Materials Provided:  Freestyle Yonis 3 information  Skin Tac sample  Overlay patch for Freestyle Yonis 3    SUBJECTIVE/OBJECTIVE:  Presents for: Individual review  Accompanied by: Self  Diabetes education in the past 24mo: Yes  Focus of Visit: CGM  Diabetes type:  "Type 2  Date of diagnosis: 2018  Disease course: Getting harder to manage  How confident are you filling out medical forms by yourself:: Extremely  Diabetes management related comments/concerns: Glucose levels higher than previous.  Transportation concerns: No  Difficulty affording diabetes medication?: No  Difficulty affording diabetes testing supplies?: No  Other concerns:: None  Cultural Influences/Ethnic Background:  Not  or     Diabetes Symptoms & Complications:  Fatigue: Yes  Neuropathy: Sometimes  Polydipsia: No  Polyphagia: No  Polyuria: No  Visual change: Sometimes  Slow healing wounds: No  Symptom course: Stable  Weight trend: Stable  Complications assessed today?: Yes  Autonomic neuropathy: No  CVA: No  Heart disease: No  Nephropathy: No  Peripheral neuropathy: No  Peripheral Vascular Disease: No  Retinopathy: No  Sexual dysfunction: No    Patient Problem List and Family Medical History reviewed for relevant medical history, current medical status, and diabetes risk factors.    Vitals:  /60   Pulse 70   Ht 1.797 m (5' 10.75\")   Wt 134.5 kg (296 lb 8 oz)   SpO2 96%   BMI 41.65 kg/m    Estimated body mass index is 41.65 kg/m  as calculated from the following:    Height as of this encounter: 1.797 m (5' 10.75\").    Weight as of this encounter: 134.5 kg (296 lb 8 oz).   Last 3 BP:   BP Readings from Last 3 Encounters:   07/27/23 150/60   07/20/23 138/76   04/17/23 123/76       History   Smoking Status     Never   Smokeless Tobacco     Former     Types: Snuff     Quit date: 10/1/2015       Labs:  Lab Results   Component Value Date    A1C 7.3 07/20/2023    A1C 7.5 05/16/2022     Lab Results   Component Value Date     12/19/2022     08/15/2022     10/14/2020     Lab Results   Component Value Date    LDL 56 08/15/2022    LDL 58 07/13/2020     HDL Cholesterol   Date Value Ref Range Status   07/13/2020 35 (L) >39 mg/dL Final     Direct Measure HDL   Date Value Ref Range " Status   08/15/2022 36 (L) >=40 mg/dL Final   ]  GFR Estimate   Date Value Ref Range Status   12/19/2022 >90 >60 mL/min/1.73m2 Final     Comment:     Effective December 21, 2021 eGFRcr in adults is calculated using the 2021 CKD-EPI creatinine equation which includes age and gender (Reece garcia al., NEJ, DOI: 10.1056/FZKCdi9668917)   10/14/2020 >90 >60 mL/min/[1.73_m2] Final     Comment:     Non  GFR Calc  Starting 12/18/2018, serum creatinine based estimated GFR (eGFR) will be   calculated using the Chronic Kidney Disease Epidemiology Collaboration   (CKD-EPI) equation.       GFR Estimate If Black   Date Value Ref Range Status   10/14/2020 >90 >60 mL/min/[1.73_m2] Final     Comment:      GFR Calc  Starting 12/18/2018, serum creatinine based estimated GFR (eGFR) will be   calculated using the Chronic Kidney Disease Epidemiology Collaboration   (CKD-EPI) equation.       Lab Results   Component Value Date    CR 0.91 12/19/2022    CR 0.74 10/14/2020     No results found for: MICROALBUMIN    Healthy Eating:  Healthy Eating Assessed Today: Yes  Cultural/Mormonism diet restrictions?: No  Meal planning/habits: None, Avoiding sweets, Low salt  How many times a week on average do you eat food made away from home (restaurant/take-out)?: 3  Meals include: Breakfast, Lunch, Dinner, Morning Snack  Breakfast: 3-4 eggs, 2-3 slices bob or 1 sausage  Lunch: Lasagna made from only meat/cheese (no noodles) or chicken dumpling dish  Dinner: meat, maybe 1-2 slices bread  Snacks: carrots/dip or chips  Beverages: Water, Coffee, Milk, Diet soda, Sports drinks, Energy drinks, Alcohol  Has patient met with a dietitian in the past?: Yes    Being Active:  Being Active Assessed Today: Yes  Exercise:: Currently not exercising  Barrier to exercise: None, Time    Monitoring:  Monitoring Assessed Today: Yes  Did patient bring glucose meter to appointment? : Yes  Blood Glucose Meter: ContourNext  Times checking blood  sugar at home (number): 2  Times checking blood sugar at home (per): Day  Blood glucose trend: Fluctuating  Fasting-142, 156, 156, 162, 176, 160, 166, 178, 167, 159, 151, 142, 157  2 hours post supper-186, 224, 209, 293, 176, 180, 252, 145  Two week average is 180.    Taking Medications:  Diabetes Medication(s)       Biguanides       metFORMIN (GLUCOPHAGE) 500 MG tablet    Take 2 tablets (1,000 mg) by mouth 2 times daily (with meals)      Sodium-Glucose Co-Transporter 2 (SGLT2) Inhibitors       JARDIANCE 25 MG TABS tablet    TAKE 1 TABLET BY MOUTH DAILY      Sulfonylureas       glimepiride (AMARYL) 2 MG tablet    TAKE 2 TABLETS BY MOUTH IN THE MORNING AND 1 TABLET IN THE EVENING WITH MEALS      Incretin Mimetic Agents       OZEMPIC, 2 MG/DOSE, 8 MG/3ML pen    INJECT 2MG SUBCUTANEOUSLY ONCE WEEKLY            Taking Medication Assessed Today: Yes  Current Treatments: Non-insulin Injectables, Oral Medication (taken by mouth) ((Metformin 1000 mg bid, Jardiance 25 mg daily, Glimepiride 4 mg am/2 gm evening, Ozempic 2 mg weekly.))  Problems taking diabetes medications regularly?: No  Diabetes medication side effects?: No    Problem Solving:  Problem Solving Assessed Today: Yes  Is the patient at risk for hypoglycemia?: Yes  Hypoglycemia Frequency: Never  Does patient have glucagon emergency kit?: No  Is the patient at risk for DKA?: No    Reducing Risks:  Reducing Risks Assessed Today: Yes  Diabetes Risks: Age over 45 years  CAD Risks: Diabetes Mellitus, Dyslipidemia, Family history, Hypertension, Obesity, Sedentary lifestyle, Stress  Has dilated eye exam at least once a year?: Yes  Sees dentist every 6 months?: Yes  Feet checked by healthcare provider in the last year?: Yes    Healthy Coping:  Healthy Coping Assessed Today: Yes  Emotional response to diabetes: Acceptance, Concern for health and well-being  Informal Support system:: None  Stage of change: MAINTENANCE (Working to maintain change, with risk of  relapse)  Patient Activation Measure Survey Score:      10/11/2018     1:00 PM   TED Score (Last Two)   TED Raw Score 30   Activation Score 56   TED Level 3       Time Spent: 50 minutes  Encounter Type: Individual    Any diabetes medication dose changes were made via the CDE Protocol per the patient's referring provider. A copy of this encounter was shared with the provider.       Sincerely,        Harmony Bird RD

## 2023-07-31 DIAGNOSIS — I10 BENIGN ESSENTIAL HYPERTENSION: ICD-10-CM

## 2023-08-02 NOTE — TELEPHONE ENCOUNTER
cardizem      Last Written Prescription Date:  8/3/22  Last Fill Quantity: 90,   # refills: 3  Last Office Visit: 7/20/23  Future Office visit:

## 2023-08-03 RX ORDER — DILTIAZEM HYDROCHLORIDE 120 MG/1
TABLET, EXTENDED RELEASE ORAL
Qty: 90 TABLET | Refills: 0 | Status: SHIPPED | OUTPATIENT
Start: 2023-08-03 | End: 2023-10-30

## 2023-08-14 ENCOUNTER — PATIENT OUTREACH (OUTPATIENT)
Dept: EDUCATION SERVICES | Facility: HOSPITAL | Age: 48
End: 2023-08-14

## 2023-08-14 DIAGNOSIS — I10 BENIGN ESSENTIAL HYPERTENSION: ICD-10-CM

## 2023-08-14 RX ORDER — LISINOPRIL 40 MG/1
TABLET ORAL
Qty: 90 TABLET | Refills: 3 | Status: SHIPPED | OUTPATIENT
Start: 2023-08-14 | End: 2024-08-06

## 2023-08-14 NOTE — TELEPHONE ENCOUNTER
Lisinopril      Last Written Prescription Date:  08/10/22  Last Fill Quantity: 90,   # refills: 3  Last Office Visit: 07/20/23  Future Office visit:       Routing refill request to provider for review/approval because:

## 2023-08-24 DIAGNOSIS — E11.9 TYPE 2 DIABETES MELLITUS WITHOUT COMPLICATION, WITHOUT LONG-TERM CURRENT USE OF INSULIN (H): ICD-10-CM

## 2023-09-12 DIAGNOSIS — E11.9 TYPE 2 DIABETES MELLITUS WITHOUT COMPLICATION, WITHOUT LONG-TERM CURRENT USE OF INSULIN (H): ICD-10-CM

## 2023-09-14 RX ORDER — EMPAGLIFLOZIN 25 MG/1
TABLET, FILM COATED ORAL
Qty: 90 TABLET | Refills: 2 | Status: SHIPPED | OUTPATIENT
Start: 2023-09-14 | End: 2024-06-10

## 2023-09-14 RX ORDER — ROSUVASTATIN CALCIUM 10 MG/1
TABLET, COATED ORAL
Qty: 90 TABLET | Refills: 3 | Status: SHIPPED | OUTPATIENT
Start: 2023-09-14 | End: 2024-09-13

## 2023-09-14 NOTE — TELEPHONE ENCOUNTER
JARDIANCE 25 MG TABS tablet 90 tablet 0 6/13/2023       rosuvastatin (CRESTOR) 10 MG tablet 90 tablet 1 3/16/2023     Last Office Visit: 07/20/23  Future Office visit:       Routing refill request to provider for review/approval because:

## 2023-10-30 DIAGNOSIS — K21.9 GASTROESOPHAGEAL REFLUX DISEASE, UNSPECIFIED WHETHER ESOPHAGITIS PRESENT: ICD-10-CM

## 2023-10-30 DIAGNOSIS — E11.65 TYPE 2 DIABETES MELLITUS WITH HYPERGLYCEMIA, WITHOUT LONG-TERM CURRENT USE OF INSULIN (H): ICD-10-CM

## 2023-10-30 DIAGNOSIS — I10 BENIGN ESSENTIAL HYPERTENSION: ICD-10-CM

## 2023-10-30 RX ORDER — DILTIAZEM HYDROCHLORIDE EXTENDED-RELEASE TABLETS 120 MG/1
TABLET, EXTENDED RELEASE ORAL
Qty: 90 TABLET | Refills: 3 | Status: SHIPPED | OUTPATIENT
Start: 2023-10-30 | End: 2024-01-25

## 2023-10-30 RX ORDER — SEMAGLUTIDE 2.68 MG/ML
INJECTION, SOLUTION SUBCUTANEOUS
Qty: 3 ML | Refills: 2 | Status: SHIPPED | OUTPATIENT
Start: 2023-10-30 | End: 2024-04-23

## 2023-10-30 NOTE — TELEPHONE ENCOUNTER
Omeprazole (Prilosec) 20 MG DR capsule     Last Written Prescription Date:  11/15/2022  Last Fill Quantity: 90,   # refills: 0  Last Office Visit: 07/20/2023      Ozempic, 2 MG/Dose, 8 MG/3ML pen    Last Written Prescription Date:  07/11/2023  Last Fill Quantity: 3mL,   # refills: 0

## 2023-10-30 NOTE — TELEPHONE ENCOUNTER
Diltiazem      Last Written Prescription Date:  6/28/23  Last Fill Quantity: 90,   # refills: 3  Last Office Visit: 7/20/23  Future Office visit:    Next 5 appointments (look out 90 days)      Jan 19, 2024  7:30 AM  (Arrive by 7:15 AM)  PHYSICAL with Naomi De Santiago MD  [facility] [place] ([facility] [place] ) [address]  [place] [address]  [phone]             Routing refill request to provider for review/approval because:

## 2023-11-06 DIAGNOSIS — I10 BENIGN ESSENTIAL HYPERTENSION: ICD-10-CM

## 2023-11-06 DIAGNOSIS — E11.9 TYPE 2 DIABETES MELLITUS WITHOUT COMPLICATION, WITHOUT LONG-TERM CURRENT USE OF INSULIN (H): ICD-10-CM

## 2023-11-06 RX ORDER — GLIMEPIRIDE 2 MG/1
TABLET ORAL
Qty: 270 TABLET | Refills: 0 | Status: SHIPPED | OUTPATIENT
Start: 2023-11-06 | End: 2024-01-16

## 2023-11-06 RX ORDER — METOPROLOL SUCCINATE 25 MG/1
25 TABLET, EXTENDED RELEASE ORAL DAILY
Qty: 90 TABLET | Refills: 3 | Status: SHIPPED | OUTPATIENT
Start: 2023-11-06

## 2023-11-06 NOTE — TELEPHONE ENCOUNTER
Toprol      Last Written Prescription Date:  11/15/22  Last Fill Quantity: 90,   # refills: 3  Last Office Visit: 7/20/23  Future Office visit:    Next 5 appointments (look out 90 days)      Jan 19, 2024  7:30 AM  (Arrive by 7:15 AM)  PHYSICAL with Naomi De Santiago MD  [facility] [place] ([facility] [place] ) [address] [facility] [address]  [phone]             Routing refill request to provider for review/approval because:        Amaryl      Last Written Prescription Date:  12/2/22  Last Fill Quantity: 270,   # refills: 3  Last Office Visit: 7/20/23  Future Office visit:    Next 5 appointments (look out 90 days)      Jan 19, 2024  7:30 AM  (Arrive by 7:15 AM)  PHYSICAL with Naomi De Santiago MD  [facility] [place] ([facility] [place] ) [address]  [place] [address]  [phone]             Routing refill request to provider for review/approval because:

## 2023-11-25 ENCOUNTER — HEALTH MAINTENANCE LETTER (OUTPATIENT)
Age: 48
End: 2023-11-25

## 2023-12-11 ENCOUNTER — TRANSFERRED RECORDS (OUTPATIENT)
Dept: HEALTH INFORMATION MANAGEMENT | Facility: CLINIC | Age: 48
End: 2023-12-11

## 2023-12-11 PROBLEM — E11.3293 MILD NONPROLIFERATIVE DIABETIC RETINOPATHY OF BOTH EYES WITHOUT MACULAR EDEMA ASSOCIATED WITH TYPE 2 DIABETES MELLITUS (H): Status: ACTIVE | Noted: 2023-12-11

## 2023-12-11 LAB — RETINOPATHY: POSITIVE

## 2023-12-28 DIAGNOSIS — E11.9 TYPE 2 DIABETES MELLITUS WITHOUT COMPLICATION, WITHOUT LONG-TERM CURRENT USE OF INSULIN (H): ICD-10-CM

## 2023-12-29 NOTE — TELEPHONE ENCOUNTER
metFORMIN (GLUCOPHAGE) 500 MG tablet       Last Written Prescription Date:  8/25/2023  Last Fill Quantity: 120,   # refills: 3  Last Office Visit: 7/20/2023  Future Office visit:    Next 5 appointments (look out 90 days)      Jan 19, 2024  7:30 AM  (Arrive by 7:15 AM)  PHYSICAL with Naomi De Santiago MD  [facility] [place] ([facility] [place] ) [address]  [place] [address]  [phone]

## 2024-01-04 ENCOUNTER — TELEPHONE (OUTPATIENT)
Dept: FAMILY MEDICINE | Facility: OTHER | Age: 49
End: 2024-01-04

## 2024-01-04 NOTE — TELEPHONE ENCOUNTER
Received an APPROVAL from [facility] for Semaglutide, 2 MG/DOSE, (OZEMPIC, 2 MG/DOSE,) 8 MG/3ML pen. Effective dates 1/4/24-1/4/27.

## 2024-01-04 NOTE — TELEPHONE ENCOUNTER
[facility] Gluc [facility] (FREESTYLE ERIC 3 SENSOR) [facility]. Submitted on CMM. Waiting for a response.

## 2024-01-04 NOTE — TELEPHONE ENCOUNTER
Received a PA request from [facility] for Semaglutide, 2 MG/DOSE, (OZEMPIC, 2 MG/DOSE,) 8 MG/3ML pen. Submitted on CMM. Waiting for a response.

## 2024-01-05 NOTE — TELEPHONE ENCOUNTER
Received a DENIAL from [employer] for Continuous Blood Gluc Sensor (FREESTYLE ERIC 3 SENSOR) [facility].         Scanned in Epic.

## 2024-01-10 ENCOUNTER — TELEPHONE (OUTPATIENT)
Dept: FAMILY MEDICINE | Facility: OTHER | Age: 49
End: 2024-01-10

## 2024-01-10 NOTE — TELEPHONE ENCOUNTER
Received an APPROVAL from [employer] [facility] for  Continuous Blood Gluc Sensor (DEXCOM G7 SENSOR) MISC. Effective dates 1/10/24-1/9/25.

## 2024-01-10 NOTE — TELEPHONE ENCOUNTER
Received a PA request from [facility] for Continuous Blood Gluc Sensor (DEXCOM G7 SENSOR) MISC. Submitted on CMM. Waiting for a response.

## 2024-01-15 ENCOUNTER — TELEPHONE (OUTPATIENT)
Dept: FAMILY MEDICINE | Facility: OTHER | Age: 49
End: 2024-01-15

## 2024-01-15 DIAGNOSIS — E11.9 TYPE 2 DIABETES MELLITUS WITHOUT COMPLICATION, WITHOUT LONG-TERM CURRENT USE OF INSULIN (H): ICD-10-CM

## 2024-01-15 NOTE — TELEPHONE ENCOUNTER
Glimepiride  Last Written Prescription Date: 11/6/23  Last Fill Quantity: 270 # of Refills: 0  Last Office Visit: 7/20/23

## 2024-01-15 NOTE — TELEPHONE ENCOUNTER
Received a PA request from [facility] for diltiazem ER (CARDIAZEM LA) 120 MG 24 hr tablet. Submitted on CMM and got message

## 2024-01-16 RX ORDER — GLIMEPIRIDE 2 MG/1
TABLET ORAL
Qty: 270 TABLET | Refills: 3 | Status: SHIPPED | OUTPATIENT
Start: 2024-01-16 | End: 2024-01-24

## 2024-01-16 NOTE — TELEPHONE ENCOUNTER
GLIMEPIRIDE 2 MG TABLET       Last Written Prescription Date:  11/6/2023  Last Fill Quantity: 270,   # refills: 0  Last Office Visit: 7/20/2023  Future Office visit:    Next 5 appointments (look out 90 days)      Jan 19, 2024  7:30 AM  (Arrive by 7:15 AM)  PHYSICAL with Naomi De Santiago MD  [facility] ([facility] ) [address] [place] [address] AVE  [place] [address]  [phone]             Routing refill request to provider for review/approval because:  Sulfonylurea Agents Cxvqal98/15/2024 02:04 PM   Protocol Details Patient has a recent creatinine (normal) within the past 12 mos.    Patient has documented A1c within the specified period of time.    Medication is active on med list    Patient is age 18 or older    Recent (6 mo) or future (30 days) visit within the authorizing provider's specialty     Kimberly Boecker, RN

## 2024-01-17 NOTE — PROGRESS NOTES
Preventive Care Visit  [facility]  Naomi De Santiago MD, Family Medicine  Jan 19, 2024      SUBJECTIVE:   Bucky is a 48 year old, presenting for the following:  Physical, Lipids, Hypertension, and Diabetes      Healthy Habits:     Getting at least 3 servings of Calcium per day:  Yes    Bi-annual eye exam:  Yes    Dental care twice a year:  Yes    Sleep apnea or symptoms of sleep apnea:  None    Diet:  Low salt    Frequency of exercise:  None    Taking medications regularly:  Yes    Medication side effects:  None    Additional concerns today:  No        Diabetes Follow-up    How often are you checking your blood sugar? Continuous glucose monitor  What time of day are you checking your blood sugars (select all that apply)?   Through out the day  Have you had any blood sugars above 200?  Yes   Have you had any blood sugars below 70?  Yes. 1% and happens at night but has alarm   What symptoms do you notice when your blood sugar is low?  Usually happens at night and  States that he feels fine  What concerns do you have today about your diabetes? None   Do you have any of these symptoms? (Select all that apply)  No numbness or tingling in feet.  No redness, sores or blisters on feet.  No complaints of excessive thirst.  No reports of blurry vision.  No significant changes to weight.    - BG are improving after the holidays       Diabetic Foot Screen:  Any complaints of increased pain or numbness ? No  Is there a foot ulcer now or a history of foot ulcer? No  Does the foot have an abnormal shape? No  Are the nails thick, too long or ingrown? No  Are there any redness or open areas? No         Sensation Testing done at all points on the diagram with monofilament     Right Foot: Sensation Absent at the following points  and 1  Left Foot: Sensation Normal at all points     Risk Category: 0- No loss of protective sensation  Performed by Naomi De Santiago MD      Hyperlipidemia Follow-Up    Are you regularly  "taking any medication or supplement to lower your cholesterol?   Yes- Crestor  Are you having muscle aches or other side effects that you think could be caused by your cholesterol lowering medication?  No    Hypertension Follow-up    Do you check your blood pressure regularly outside of the clinic? No   Are you following a low salt diet? Yes  Are your blood pressures ever more than 140 on the top number (systolic) OR more   than 90 on the bottom number (diastolic), for example 140/90? Yes    BP Readings from Last 2 Encounters:   01/19/24 128/72   07/27/23 150/60     Hemoglobin A1C (%)   Date Value   07/20/2023 7.3 (H)   04/17/2023 7.4 (H)   05/16/2022 7.5 (A)   11/15/2021 6.9 (A)     LDL Cholesterol Calculated (mg/dL)   Date Value   08/15/2022 56   08/13/2021 55   07/13/2020 58   01/10/2019 50         Social History     Tobacco Use    Smoking status: Former     Types: Dip, chew, snus or snuff    Smokeless tobacco: Former     Types: Snuff     Quit date: 10/1/2015   Substance Use Topics    Alcohol use: Yes     Comment: Rarely             1/18/2024     8:12 PM   Alcohol Use   Prescreen: >3 drinks/day or >7 drinks/week? No          No data to display                Last PSA: No results found for: \"PSA\"    Reviewed orders with patient. Reviewed health maintenance and updated orders accordingly - Yes  BP Readings from Last 3 Encounters:   01/19/24 128/72   07/27/23 150/60   07/20/23 138/76    Wt Readings from Last 3 Encounters:   01/19/24 129.9 kg (286 lb 6.4 oz)   07/27/23 134.5 kg (296 lb 8 oz)   07/20/23 135.4 kg (298 lb 6.4 oz)                  Current Outpatient Medications   Medication Sig Dispense Refill    aspirin 81 MG tablet Take 1 tablet (81 mg) by mouth daily 90 tablet 3    blood glucose (CONTOUR NEXT TEST) test strip Use to test blood sugar 2 times daily. 100 each 6    Continuous Blood Gluc Sensor (DEXCOM G7 SENSOR) MISC Change every 10 days. 3 each 5    diltiazem ER (CARDIAZEM LA) 120 MG 24 hr tablet TAKE 1 " "TABLET BY MOUTH DAILY ALONG WITH 1-240MG TABLET 90 tablet 3    diltiazem ER (TIAZAC) 240 MG 24 hr ER beaded capsule TAKE 1 CAPSULE BY MOUTH DAILY WITH A 120MG CAPSULE 90 capsule 3    empagliflozin (JARDIANCE) 25 MG TABS tablet TAKE 1 TABLET BY MOUTH DAILY 90 tablet 2    glimepiride (AMARYL) 2 MG tablet TAKE 2 TABLETS BY MOUTH IN THE MORNING AND 1 TABLET IN THE EVENING WITH MEALS 270 tablet 3    lisinopril (ZESTRIL) 40 MG tablet TAKE 1 TABLET BY MOUTH DAILY 90 tablet 3    metFORMIN (GLUCOPHAGE) 500 MG tablet TAKE 2 TABLETS BY MOUTH 2 TIMES DAILY WITH MEALS 120 tablet 5    metoprolol succinate ER (TOPROL XL) 25 MG 24 hr tablet TAKE ONE TABLET BY MOUTH DAILY 90 tablet 3    omeprazole (PRILOSEC) 20 MG DR capsule TAKE 1 CAPSULE BY MOUTH DAILY. TAKE 1/2 HOUR BEFORE YOUR FIRST MEAL OF THE DAY. 90 capsule 2    rosuvastatin (CRESTOR) 10 MG tablet TAKE 1 TABLET BY MOUTH DAILY 90 tablet 3    Semaglutide, 2 MG/DOSE, (OZEMPIC, 2 MG/DOSE,) 8 MG/3ML pen INJECT 2MG SUBCUTANEOUSLY ONCE WEEKLY 3 mL 2       # Wellness:  - Immunizations: tdap (updating today), influenza (declines), covid (declines)  - Lipids: updating today   - Dexa scan: NA  - Colon cancer screening: due - insurance changed and will set up colonoscopy for this year   - PSA: NA d/t age  - Lung cancer screening: never smoker, former chew   - AAA screening:       Reviewed and updated as needed this visit by clinical staff   Tobacco  Allergies  Meds  Problems  Med Hx  Surg Hx  Fam Hx          Reviewed and updated as needed this visit by Provider   Tobacco  Allergies  Meds  Problems  Med Hx  Surg Hx  Fam Hx              OBJECTIVE:   /72   Pulse 70   Temp 98.1  F (36.7  C) (Tympanic)   Resp 17   Wt 129.9 kg (286 lb 6.4 oz)   SpO2 98%   BMI 40.23 kg/m     Estimated body mass index is 40.23 kg/m  as calculated from the following:    Height as of 7/27/23: 1.797 m (5' 10.75\").    Weight as of this encounter: 129.9 kg (286 lb 6.4 oz).  Review of Systems "   Constitutional:  Negative for chills and fever.   HENT:  Negative for congestion, ear pain, hearing loss and sore throat.    Eyes:  Negative for pain and visual disturbance.   Respiratory:  Negative for cough and shortness of breath.    Cardiovascular:  Negative for chest pain and palpitations.   Gastrointestinal:  Negative for abdominal pain, constipation, diarrhea and nausea.   Genitourinary:  Negative for dysuria, frequency, genital sores, hematuria, penile discharge and urgency.   Musculoskeletal:  Negative for arthralgias, joint swelling and myalgias.   Skin:  Negative for rash.   Neurological:  Negative for dizziness, weakness and headaches.   Psychiatric/Behavioral:  The patient is not nervous/anxious.        Physical Exam  Constitutional:       General: He is not in acute distress.     Appearance: He is well-developed.   HENT:      Head: Normocephalic and atraumatic.      Right Ear: Tympanic membrane normal.      Left Ear: Tympanic membrane normal.      Mouth/Throat:      Mouth: Mucous membranes are moist.      Pharynx: No oropharyngeal exudate.   Eyes:      Extraocular Movements: Extraocular movements intact.      Conjunctiva/sclera: Conjunctivae normal.   Neck:      Thyroid: No thyromegaly.   Cardiovascular:      Rate and Rhythm: Normal rate and regular rhythm.      Pulses: Normal pulses.           Dorsalis pedis pulses are 2+ on the right side and 2+ on the left side.      Heart sounds: No murmur heard.  Pulmonary:      Effort: Pulmonary effort is normal. No respiratory distress.      Breath sounds: No wheezing or rales.   Abdominal:      General: Bowel sounds are normal. There is no distension.      Palpations: Abdomen is soft.      Tenderness: There is no abdominal tenderness. There is no guarding.   Musculoskeletal:         General: Normal range of motion.      Cervical back: Normal range of motion and neck supple.      Right lower leg: No edema.      Left lower leg: No edema.   Feet:      Right  foot:      Protective Sensation: 10 sites tested.  9 sites sensed.      Skin integrity: Skin integrity normal.      Toenail Condition: Fungal disease present.     Left foot:      Protective Sensation: 10 sites tested.  10 sites sensed.      Skin integrity: Skin integrity normal.      Toenail Condition: Left toenails are normal.   Lymphadenopathy:      Cervical: No cervical adenopathy.   Skin:     General: Skin is warm and dry.   Neurological:      Mental Status: He is alert.   Psychiatric:         Mood and Affect: Mood normal.         Diagnostic Test Results:  Results for orders placed or performed in visit on 01/19/24 (from the past 24 hour(s))   CBC with platelets   Result Value Ref Range    WBC Count 10.3 4.0 - 11.0 10e3/uL    RBC Count 5.31 4.40 - 5.90 10e6/uL    Hemoglobin 15.6 13.3 - 17.7 g/dL    Hematocrit 45.8 40.0 - 53.0 %    MCV 86 78 - 100 fL    MCH 29.4 26.5 - 33.0 pg    MCHC 34.1 31.5 - 36.5 g/dL    RDW 13.2 10.0 - 15.0 %    Platelet Count 288 150 - 450 10e3/uL   Comprehensive metabolic panel   Result Value Ref Range    Sodium 139 135 - 145 mmol/L    Potassium 3.8 3.4 - 5.3 mmol/L    Carbon Dioxide (CO2) 26 22 - 29 mmol/L    Anion Gap 12 7 - 15 mmol/L    Urea Nitrogen 15.8 6.0 - 20.0 mg/dL    Creatinine 0.81 0.67 - 1.17 mg/dL    GFR Estimate >90 >60 mL/min/1.73m2    Calcium 9.1 8.6 - 10.0 mg/dL    Chloride 101 98 - 107 mmol/L    Glucose 147 (H) 70 - 99 mg/dL    Alkaline Phosphatase 117 40 - 150 U/L    AST 30 0 - 45 U/L    ALT 25 0 - 70 U/L    Protein Total 7.6 6.4 - 8.3 g/dL    Albumin 4.4 3.5 - 5.2 g/dL    Bilirubin Total 0.5 <=1.2 mg/dL   Lipid Profile   Result Value Ref Range    Cholesterol 143 <200 mg/dL    Triglycerides 176 (H) <150 mg/dL    Direct Measure HDL 42 >=40 mg/dL    LDL Cholesterol Calculated 66 <=100 mg/dL    Non HDL Cholesterol 101 <130 mg/dL    Patient Fasting > 8hrs? Yes     Narrative    Cholesterol  Desirable:  <200 mg/dL    Triglycerides  Normal:  Less than 150 mg/dL  Borderline  "High:  150-199 mg/dL  High:  200-499 mg/dL  Very High:  Greater than or equal to 500 mg/dL    Direct Measure HDL  Female:  Greater than or equal to 50 mg/dL   Male:  Greater than or equal to 40 mg/dL    LDL Cholesterol  Desirable:  <100mg/dL  Above Desirable:  100-129 mg/dL   Borderline High:  130-159 mg/dL   High:  160-189 mg/dL   Very High:  >= 190 mg/dL    Non HDL Cholesterol  Desirable:  130 mg/dL  Above Desirable:  130-159 mg/dL  Borderline High:  160-189 mg/dL  High:  190-219 mg/dL  Very High:  Greater than or equal to 220 mg/dL   Hemoglobin A1c   Result Value Ref Range    Estimated Average Glucose 154 mg/dL    Hemoglobin A1C 7.0 (H) <5.7 %   Albumin Random Urine Quantitative with Creat Ratio   Result Value Ref Range    Creatinine Urine mg/dL 122.4 mg/dL    Albumin Urine mg/L 22.1 mg/L    Albumin Urine mg/g Cr 18.06 (H) 0.00 - 17.00 mg/g Cr       ASSESSMENT/PLAN:   Routine general medical examination at a health care facility  Bucky is doing well  Repeat wellness visit in one year     Type 2 diabetes mellitus without complication, without long-term current use of insulin (H)  A1c today of 7.0. BG are improving after the holidays  - Hemoglobin A1c; Future  - Albumin Random Urine Quantitative with Creat Ratio; Future  - FOOT EXAM  NO CHARGE [[id number]]  - will make appt with [facility]   - c/w glimepiride, metformin  - c/w jardiance   - c/w ozempic    Mixed hyperlipidemia  LDL today of 66  - Comprehensive metabolic panel; Future  - Lipid Profile; Future  - c/w crestor 10mg     Benign essential hypertension  BP at goal   - CBC with platelets; Future  - c/w metoprolol succinate, diltiazem, lisinopril     Need for vaccination  Updated tetanus today       Counseling  Reviewed preventive health counseling, as reflected in patient instructions      BMI  Estimated body mass index is 40.23 kg/m  as calculated from the following:    Height as of 7/27/23: 1.797 m (5' 10.75\").    Weight as of this " encounter: 129.9 kg (286 lb 6.4 oz).   Weight management plan: Discussed healthy diet and exercise guidelines      He reports that he has quit smoking. His smoking use included dip, chew, snus or snuff. He quit smokeless tobacco use about 8 years ago.  His smokeless tobacco use included snuff.                  Signed Electronically by: Naomi De Santiago MD

## 2024-01-18 PROBLEM — E11.9 ENCOUNTER FOR DIABETIC FOOT EXAM (H): Status: RESOLVED | Noted: 2018-10-31 | Resolved: 2024-01-18

## 2024-01-18 ASSESSMENT — ENCOUNTER SYMPTOMS
PALPITATIONS: 0
ARTHRALGIAS: 0
EYE PAIN: 0
MYALGIAS: 0
SHORTNESS OF BREATH: 0
ABDOMINAL PAIN: 0
COUGH: 0
FREQUENCY: 0
NAUSEA: 0
HEADACHES: 0
CONSTIPATION: 0
DYSURIA: 0
HEARTBURN: 0
CHILLS: 0
NERVOUS/ANXIOUS: 0
HEMATURIA: 0
WEAKNESS: 0
FEVER: 0
HEMATOCHEZIA: 0
JOINT SWELLING: 0
SORE THROAT: 0
DIARRHEA: 0
PARESTHESIAS: 0
DIZZINESS: 0

## 2024-01-19 ENCOUNTER — LAB (OUTPATIENT)
Dept: LAB | Facility: OTHER | Age: 49
End: 2024-01-19
Attending: FAMILY MEDICINE
Payer: COMMERCIAL

## 2024-01-19 ENCOUNTER — OFFICE VISIT (OUTPATIENT)
Dept: FAMILY MEDICINE | Facility: OTHER | Age: 49
End: 2024-01-19
Attending: FAMILY MEDICINE
Payer: COMMERCIAL

## 2024-01-19 VITALS
HEART RATE: 70 BPM | SYSTOLIC BLOOD PRESSURE: 128 MMHG | TEMPERATURE: 98.1 F | WEIGHT: 286.4 LBS | OXYGEN SATURATION: 98 % | RESPIRATION RATE: 17 BRPM | DIASTOLIC BLOOD PRESSURE: 72 MMHG | BODY MASS INDEX: 40.23 KG/M2

## 2024-01-19 DIAGNOSIS — E11.9 TYPE 2 DIABETES MELLITUS WITHOUT COMPLICATION, WITHOUT LONG-TERM CURRENT USE OF INSULIN (H): ICD-10-CM

## 2024-01-19 DIAGNOSIS — Z00.00 ROUTINE GENERAL MEDICAL EXAMINATION AT A HEALTH CARE FACILITY: Primary | ICD-10-CM

## 2024-01-19 DIAGNOSIS — I10 BENIGN ESSENTIAL HYPERTENSION: ICD-10-CM

## 2024-01-19 DIAGNOSIS — E78.2 MIXED HYPERLIPIDEMIA: ICD-10-CM

## 2024-01-19 DIAGNOSIS — Z23 NEED FOR VACCINATION: ICD-10-CM

## 2024-01-19 LAB
ALBUMIN SERPL BCG-MCNC: 4.4 G/DL (ref 3.5–5.2)
ALP SERPL-CCNC: 117 U/L (ref 40–150)
ALT SERPL W P-5'-P-CCNC: 25 U/L (ref 0–70)
ANION GAP SERPL CALCULATED.3IONS-SCNC: 12 MMOL/L (ref 7–15)
AST SERPL W P-5'-P-CCNC: 30 U/L (ref 0–45)
BILIRUB SERPL-MCNC: 0.5 MG/DL
BUN SERPL-MCNC: 15.8 MG/DL (ref 6–20)
CALCIUM SERPL-MCNC: 9.1 MG/DL (ref 8.6–10)
CHLORIDE SERPL-SCNC: 101 MMOL/L (ref 98–107)
CHOLEST SERPL-MCNC: 143 MG/DL
CREAT SERPL-MCNC: 0.81 MG/DL (ref 0.67–1.17)
CREAT UR-MCNC: 122.4 MG/DL
DEPRECATED HCO3 PLAS-SCNC: 26 MMOL/L (ref 22–29)
EGFRCR SERPLBLD CKD-EPI 2021: >90 ML/MIN/1.73M2
ERYTHROCYTE [DISTWIDTH] IN BLOOD BY AUTOMATED COUNT: 13.2 % (ref 10–15)
EST. AVERAGE GLUCOSE BLD GHB EST-MCNC: 154 MG/DL
FASTING STATUS PATIENT QL REPORTED: YES
GLUCOSE SERPL-MCNC: 147 MG/DL (ref 70–99)
HBA1C MFR BLD: 7 %
HCT VFR BLD AUTO: 45.8 % (ref 40–53)
HDLC SERPL-MCNC: 42 MG/DL
HGB BLD-MCNC: 15.6 G/DL (ref 13.3–17.7)
LDLC SERPL CALC-MCNC: 66 MG/DL
MCH RBC QN AUTO: 29.4 PG (ref 26.5–33)
MCHC RBC AUTO-ENTMCNC: 34.1 G/DL (ref 31.5–36.5)
MCV RBC AUTO: 86 FL (ref 78–100)
MICROALBUMIN UR-MCNC: 22.1 MG/L
MICROALBUMIN/CREAT UR: 18.06 MG/G CR (ref 0–17)
NONHDLC SERPL-MCNC: 101 MG/DL
PLATELET # BLD AUTO: 288 10E3/UL (ref 150–450)
POTASSIUM SERPL-SCNC: 3.8 MMOL/L (ref 3.4–5.3)
PROT SERPL-MCNC: 7.6 G/DL (ref 6.4–8.3)
RBC # BLD AUTO: 5.31 10E6/UL (ref 4.4–5.9)
SODIUM SERPL-SCNC: 139 MMOL/L (ref 135–145)
TRIGL SERPL-MCNC: 176 MG/DL
WBC # BLD AUTO: 10.3 10E3/UL (ref 4–11)

## 2024-01-19 PROCEDURE — 82043 UR ALBUMIN QUANTITATIVE: CPT

## 2024-01-19 PROCEDURE — 99213 OFFICE O/P EST LOW 20 MIN: CPT | Mod: 25 | Performed by: FAMILY MEDICINE

## 2024-01-19 PROCEDURE — 99396 PREV VISIT EST AGE 40-64: CPT | Mod: 25 | Performed by: FAMILY MEDICINE

## 2024-01-19 PROCEDURE — 85027 COMPLETE CBC AUTOMATED: CPT

## 2024-01-19 PROCEDURE — 99207 PR FOOT EXAM NO CHARGE: CPT | Performed by: FAMILY MEDICINE

## 2024-01-19 PROCEDURE — 90715 TDAP VACCINE 7 YRS/> IM: CPT | Performed by: FAMILY MEDICINE

## 2024-01-19 PROCEDURE — 36415 COLL VENOUS BLD VENIPUNCTURE: CPT

## 2024-01-19 PROCEDURE — 82570 ASSAY OF URINE CREATININE: CPT

## 2024-01-19 PROCEDURE — 90471 IMMUNIZATION ADMIN: CPT | Performed by: FAMILY MEDICINE

## 2024-01-19 PROCEDURE — 83036 HEMOGLOBIN GLYCOSYLATED A1C: CPT

## 2024-01-19 PROCEDURE — 80053 COMPREHEN METABOLIC PANEL: CPT

## 2024-01-19 PROCEDURE — 80061 LIPID PANEL: CPT

## 2024-01-19 ASSESSMENT — ENCOUNTER SYMPTOMS
JOINT SWELLING: 0
HEMATURIA: 0
ARTHRALGIAS: 0
CONSTIPATION: 0
DIARRHEA: 0
SORE THROAT: 0
FEVER: 0
SHORTNESS OF BREATH: 0
FREQUENCY: 0
HEADACHES: 0
DIZZINESS: 0
NERVOUS/ANXIOUS: 0
NAUSEA: 0
WEAKNESS: 0
PALPITATIONS: 0
DYSURIA: 0
EYE PAIN: 0
CHILLS: 0
ABDOMINAL PAIN: 0
COUGH: 0
MYALGIAS: 0

## 2024-01-19 ASSESSMENT — PAIN SCALES - GENERAL: PAINLEVEL: NO PAIN (0)

## 2024-01-19 NOTE — PROGRESS NOTES
Attempt # 1  Outcome: Left Message   Comment: LVM for pt to schedule next physical, 6 month CDM visit with PCP, and a follow up with [facility].

## 2024-01-23 ENCOUNTER — TELEPHONE (OUTPATIENT)
Dept: FAMILY MEDICINE | Facility: OTHER | Age: 49
End: 2024-01-23

## 2024-01-23 DIAGNOSIS — I10 BENIGN ESSENTIAL HYPERTENSION: ICD-10-CM

## 2024-01-23 DIAGNOSIS — E11.65 TYPE 2 DIABETES MELLITUS WITH HYPERGLYCEMIA, WITHOUT LONG-TERM CURRENT USE OF INSULIN (H): Primary | ICD-10-CM

## 2024-01-23 NOTE — TELEPHONE ENCOUNTER
Received a PA request from [facility] for diltiazem ER (CARDIAZEM LA) 120 MG 24 hr tablet. Submitted on CMM. Waiting for a response.

## 2024-01-24 ENCOUNTER — HOSPITAL ENCOUNTER (OUTPATIENT)
Dept: EDUCATION SERVICES | Facility: HOSPITAL | Age: 49
Discharge: HOME | End: 2024-01-24
Attending: DIETITIAN, REGISTERED | Admitting: DIETITIAN, REGISTERED
Payer: COMMERCIAL

## 2024-01-24 VITALS
HEART RATE: 79 BPM | OXYGEN SATURATION: 96 % | RESPIRATION RATE: 16 BRPM | SYSTOLIC BLOOD PRESSURE: 138 MMHG | WEIGHT: 292.2 LBS | BODY MASS INDEX: 40.91 KG/M2 | HEIGHT: 71 IN | DIASTOLIC BLOOD PRESSURE: 85 MMHG

## 2024-01-24 DIAGNOSIS — E11.9 TYPE 2 DIABETES MELLITUS WITHOUT COMPLICATION, WITHOUT LONG-TERM CURRENT USE OF INSULIN (H): ICD-10-CM

## 2024-01-24 DIAGNOSIS — E11.9 TYPE 2 DIABETES MELLITUS WITHOUT COMPLICATION, WITHOUT LONG-TERM CURRENT USE OF INSULIN (H): Primary | ICD-10-CM

## 2024-01-24 PROCEDURE — G0108 DIAB MANAGE TRN  PER INDIV: HCPCS | Performed by: DIETITIAN, REGISTERED

## 2024-01-24 RX ORDER — GLIMEPIRIDE 2 MG/1
TABLET ORAL
Qty: 360 TABLET | Refills: 3 | Status: SHIPPED | OUTPATIENT
Start: 2024-01-24

## 2024-01-24 RX ORDER — TIRZEPATIDE 2.5 MG/.5ML
2.5 INJECTION, SOLUTION SUBCUTANEOUS
Qty: 2 ML | Refills: 1 | Status: SHIPPED | OUTPATIENT
Start: 2024-01-24 | End: 2024-03-01 | Stop reason: DRUGHIGH

## 2024-01-24 ASSESSMENT — PAIN SCALES - GENERAL: PAINLEVEL: MILD PAIN (2)

## 2024-01-24 NOTE — LETTER
1/24/2024        RE: Bucky Franco  [address]  [place] [address]        Diabetes Self-Management Education & Support    Presents for: Individual review    Type of Service: In Person Visit    ASSESSMENT:  Recent A1c 7.0%.  Pt has been using Freestyle Yonis 3 for glucose monitoring which he finds helpful.  He is planning to transition to Dexcom G7 per insurance coverage.  No significant weight loss with use of Ozempic.  Consider Mounjaro since A1c not to target and no weight loss.  Pt is agreeable.  Eye exam and foot exam are up to date.      Patient's most recent   Lab Results   Component Value Date    A1C 7.0 01/19/2024    A1C 7.5 05/16/2022     is not meeting goal of <7.0    Diabetes knowledge and skills assessment:   Patient is knowledgeable in diabetes management concepts related to: Healthy Eating, Being Active, Monitoring, Taking Medication, Problem Solving, Reducing Risks, and Healthy Coping    Education today focused on the following diabetes management concepts: Healthy Eating - Encouraged less snacking as that seems to be downfall for patient.  Taking Medication-Reviewed Mounjaro mechanism of action, possible side effects, dosing and injection technique using demo pen.  Pt comfortable with injections from doing Ozempic injection.      Based on learning assessment above, most appropriate setting for further diabetes education would be: Individual setting.      PLAN  Work on less snacking.   Pt will My Chart or call with any concerns when he transitions to Dexcom G7.  He will also let us know email for data sharing once he is set up.   Message sent to provider to discontinue Ozempic and try Mounjaro.  Awaiting reply.      Follow-up: Pending above.  Will be in contact monthly if Mounjaro approved for dose adjustments.      See Care Plan for co-developed, patient-state behavior change goals.  AVS provided for patient today.    Education Materials Provided:  Mounjaro savings  "card    SUBJECTIVE/OBJECTIVE:  Presents for: Individual review  Accompanied by: Self  Diabetes education in the past 24mo: Yes  Focus of Visit: Assistance w/ making life changes  Diabetes type: Type 2  Date of diagnosis: 2018  Disease course: Stable  How confident are you filling out medical forms by yourself:: Extremely  Diabetes management related comments/concerns: Pt is planning to transition from Freestyle Yonis 3 to Dexcom G7 per insurance preference.  Transportation concerns: No  Difficulty affording diabetes medication?: No  Difficulty affording diabetes testing supplies?: No  Other concerns:: None  Cultural Influences/Ethnic Background:  Not  or     Diabetes Symptoms & Complications:  Diabetes Related Symptoms: Fatigue, Neuropathy  Weight trend: Decreasing (Weight is down 4# since July 2023.)  Symptom course: Stable  Disease course: Stable  Complications assessed today?: Yes  CVA: No  Heart disease: No  Nephropathy: No  Retinopathy: Yes (mild non-proliferative)    Patient Problem List and Family Medical History reviewed for relevant medical history, current medical status, and diabetes risk factors.    Vitals:  /85   Pulse 79   Resp 16   Ht 1.791 m (5' 10.5\")   Wt 132.5 kg (292 lb 3.2 oz)   SpO2 96%   BMI 41.33 kg/m    Estimated body mass index is 41.33 kg/m  as calculated from the following:    Height as of this encounter: 1.791 m (5' 10.5\").    Weight as of this encounter: 132.5 kg (292 lb 3.2 oz).   Last 3 BP:   BP Readings from Last 3 Encounters:   01/24/24 138/85   01/19/24 128/72   07/27/23 150/60       History   Smoking Status     Former     Types: Dip, chew, snus or snuff   Smokeless Tobacco     Former     Types: Snuff     Quit date: 10/1/2015       Labs:  Lab Results   Component Value Date    A1C 7.0 01/19/2024    A1C 7.5 05/16/2022     Lab Results   Component Value Date     01/19/2024     08/15/2022     10/14/2020     Lab Results   Component Value " "Date    LDL 66 01/19/2024    LDL 58 07/13/2020     HDL Cholesterol   Date Value Ref Range Status   07/13/2020 35 (L) >39 mg/dL Final     Direct Measure HDL   Date Value Ref Range Status   01/19/2024 42 >=40 mg/dL Final   ]  GFR Estimate   Date Value Ref Range Status   01/19/2024 >90 >60 mL/min/1.73m2 Final   10/14/2020 >90 >60 mL/min/[1.73_m2] Final     Comment:     Non  GFR Calc  Starting 12/18/2018, serum creatinine based estimated GFR (eGFR) will be   calculated using the Chronic Kidney Disease Epidemiology Collaboration   (CKD-EPI) equation.       GFR Estimate If Black   Date Value Ref Range Status   10/14/2020 >90 >60 mL/min/[1.73_m2] Final     Comment:      GFR Calc  Starting 12/18/2018, serum creatinine based estimated GFR (eGFR) will be   calculated using the Chronic Kidney Disease Epidemiology Collaboration   (CKD-EPI) equation.       Lab Results   Component Value Date    CR 0.81 01/19/2024    CR 0.74 10/14/2020     No results found for: \"MICROALBUMIN\"    Healthy Eating:  Healthy Eating Assessed Today: Yes  Cultural/[employer] diet restrictions?: No  Do you have any food allergies or intolerances?: No  Meal planning/habits: None, Low salt  Who cooks/prepares meals for you?: Self  Who purchases food in  your home?: Self  Meals include: Breakfast, Lunch, Dinner  Breakfast: 3 eggs with chicken/bob/cheese  Lunch: protein or salad bar at [employer] with protein/vinegarette or sometimes Western dressing or fast food  Dinner: protein, 1 toast  Snacks: Daytime at work - cookies, vending machine items.  Pt states he has a weakness for sweets and knows he needs to work on this.  Beverages: Water, Milk, Alcohol  Has patient met with a dietitian in the past?: Yes    Being Active:  Being Active Assessed Today: Yes  Exercise:: Currently not exercising  Barrier to exercise: None    Monitoring:  Monitoring Assessed Today: Yes  Did patient bring glucose meter to appointment? : Yes  Blood " Glucose Meter: CGM (Freestyle Yonis 3 - will transition to Dexcom G7.)  Times checking blood sugar at home (number): 5+  Times checking blood sugar at home (per): Day    Freestyle Yonis 3 AGP Report  01/11/2024 to 01/24/2024    % Time CGM is Active 96%    Average Glucose  158    Glucose Management Indicator  (GMI)    7.1%    Glucose Variability                  30%    Time in Ranges:  >250 mg/dL   5%  181-250 mg/dL  23%   mg/dL   72%  54-69 mg/dL   0%  <54 mg/dL   0%     Taking Medications:  Diabetes Medication(s)       Biguanides       metFORMIN (GLUCOPHAGE) 500 MG tablet TAKE 2 TABLETS BY MOUTH 2 TIMES DAILY WITH MEALS       Sodium-Glucose Co-Transporter 2 (SGLT2) Inhibitors       empagliflozin (JARDIANCE) 25 MG TABS tablet TAKE 1 TABLET BY MOUTH DAILY       Sulfonylureas       glimepiride (AMARYL) 2 MG tablet TAKE 2 TABLETS BY MOUTH IN THE MORNING AND 2 TABLETS IN THE EVENING WITH MEALS       Incretin Mimetic Agents       Semaglutide, 2 MG/DOSE, (OZEMPIC, 2 MG/DOSE,) 8 MG/3ML pen INJECT 2MG SUBCUTANEOUSLY ONCE WEEKLY            Taking Medication Assessed Today: Yes  Current Treatments: Non-insulin Injectables, Oral Medication (taken by mouth) (Metformin 1000 mg bid, Glimepiride 4 mg bid, Jardiance 25 mg daily,  Ozempic 2 mg weekly.)  Problems taking diabetes medications regularly?: No  Diabetes medication side effects?: No    Problem Solving:  Problem Solving Assessed Today: Yes  Is the patient at risk for hypoglycemia?: Yes  Hypoglycemia Frequency: Other (0% on Yonis 3 download today.)  Patient carries a carbohydrate source: No    Hypoglycemia Symptoms  Hypoglycemia: None    Hypoglycemia Complications  Hypoglycemia Complications: None    Reducing Risks:  Reducing Risks Assessed Today: Yes  Diabetes Risks: Age over 45 years, Sedentary Lifestyle  CAD Risks: Diabetes Mellitus, Male sex, Obesity, Stress  Has dilated eye exam at least once a year?: Yes  Sees dentist every 6 months?: Yes  Feet checked by  healthcare provider in the last year?: Yes    Healthy Coping:  Healthy Coping Assessed Today: Yes  Emotional response to diabetes: Concern for health and well-being, Acceptance, Ready to learn  Informal Support system:: Family  Stage of change: ACTION (Actively working towards change)  Patient Activation Measure Survey Score:      10/11/2018     1:00 PM   TED Score (Last Two)   TED Raw Score 30   Activation Score 56   TED Level 3       Time Spent: 45 minutes  Encounter Type: Individual    Any diabetes medication dose changes were made via the CDE Protocol per the patient's referring provider. A copy of this encounter was shared with the provider.       Sincerely,        [facility] Bird RD

## 2024-01-24 NOTE — PROGRESS NOTES
Diabetes Self-Management Education & Support    Presents for: Individual review    Type of Service: In Person Visit    ASSESSMENT:  Recent A1c 7.0%.  Pt has been using Freestyle Yonis 3 for glucose monitoring which he finds helpful.  He is planning to transition to Dexcom G7 per insurance coverage.  No significant weight loss with use of Ozempic.  Consider Mounjaro since A1c not to target and no weight loss.  Pt is agreeable.  Eye exam and foot exam are up to date.      Patient's most recent   Lab Results   Component Value Date    A1C 7.0 01/19/2024    A1C 7.5 05/16/2022     is not meeting goal of <7.0    Diabetes knowledge and skills assessment:   Patient is knowledgeable in diabetes management concepts related to: Healthy Eating, Being Active, Monitoring, Taking Medication, Problem Solving, Reducing Risks, and Healthy Coping    Education today focused on the following diabetes management concepts: Healthy Eating - Encouraged less snacking as that seems to be downfall for patient.  Taking Medication-Reviewed Mounjaro mechanism of action, possible side effects, dosing and injection technique using demo pen.  Pt comfortable with injections from doing Ozempic injection.      Based on learning assessment above, most appropriate setting for further diabetes education would be: Individual setting.      PLAN  Work on less snacking.   Pt will My Chart or call with any concerns when he transitions to Dexcom G7.  He will also let us know email for data sharing once he is set up.   Message sent to provider to discontinue Ozempic and try Mounjaro.  Awaiting reply.      Follow-up: Pending above.  Will be in contact monthly if Mounjaro approved for dose adjustments.      See Care Plan for co-developed, patient-state behavior change goals.  AVS provided for patient today.    Education Materials Provided:  Mounjaro savings card    SUBJECTIVE/OBJECTIVE:  Presents for: Individual review  Accompanied by: Self  Diabetes education in  "the past 24mo: Yes  Focus of Visit: Assistance w/ making life changes  Diabetes type: Type 2  Date of diagnosis: 2018  Disease course: Stable  How confident are you filling out medical forms by yourself:: Extremely  Diabetes management related comments/concerns: Pt is planning to transition from [employer] Yonis 3 to Dexcom G7 per insurance preference.  Transportation concerns: No  Difficulty affording diabetes medication?: No  Difficulty affording diabetes testing supplies?: No  Other concerns:: None  Cultural Influences/Ethnic Background:  Not  or     Diabetes Symptoms & Complications:  Diabetes Related Symptoms: Fatigue, Neuropathy  Weight trend: Decreasing (Weight is down 4# since July 2023.)  Symptom course: Stable  Disease course: Stable  Complications assessed today?: Yes  CVA: No  Heart disease: No  Nephropathy: No  Retinopathy: Yes (mild non-proliferative)    Patient Problem List and Family Medical History reviewed for relevant medical history, current medical status, and diabetes risk factors.    Vitals:  /85   Pulse 79   Resp 16   Ht 1.791 m (5' 10.5\")   Wt 132.5 kg (292 lb 3.2 oz)   SpO2 96%   BMI 41.33 kg/m    Estimated body mass index is 41.33 kg/m  as calculated from the following:    Height as of this encounter: 1.791 m (5' 10.5\").    Weight as of this encounter: 132.5 kg (292 lb 3.2 oz).   Last 3 BP:   BP Readings from Last 3 Encounters:   01/24/24 138/85   01/19/24 128/72   07/27/23 150/60       History   Smoking Status    Former    Types: Dip, chew, snus or snuff   Smokeless Tobacco    Former    Types: Snuff    Quit date: 10/1/2015       Labs:  Lab Results   Component Value Date    A1C 7.0 01/19/2024    A1C 7.5 05/16/2022     Lab Results   Component Value Date     01/19/2024     08/15/2022     10/14/2020     Lab Results   Component Value Date    LDL 66 01/19/2024    LDL 58 07/13/2020     HDL Cholesterol   Date Value Ref Range Status   07/13/2020 35 (L) " ">39 mg/dL Final     Direct Measure HDL   Date Value Ref Range Status   01/19/2024 42 >=40 mg/dL Final   ]  GFR Estimate   Date Value Ref Range Status   01/19/2024 >90 >60 mL/min/1.73m2 Final   10/14/2020 >90 >60 mL/min/[1.73_m2] Final     Comment:     Non  GFR Calc  Starting 12/18/2018, serum creatinine based estimated GFR (eGFR) will be   calculated using the Chronic Kidney Disease Epidemiology Collaboration   (CKD-EPI) equation.       GFR Estimate If Black   Date Value Ref Range Status   10/14/2020 >90 >60 mL/min/[1.73_m2] Final     Comment:      GFR Calc  Starting 12/18/2018, serum creatinine based estimated GFR (eGFR) will be   calculated using the Chronic Kidney Disease Epidemiology Collaboration   (CKD-EPI) equation.       Lab Results   Component Value Date    CR 0.81 01/19/2024    CR 0.74 10/14/2020     No results found for: \"MICROALBUMIN\"    Healthy Eating:  Healthy Eating Assessed Today: Yes  Cultural/Sabianist diet restrictions?: No  Do you have any food allergies or intolerances?: No  Meal planning/habits: None, Low salt  Who cooks/prepares meals for you?: Self  Who purchases food in  your home?: Self  Meals include: Breakfast, Lunch, Dinner  Breakfast: 3 eggs with chicken/bob/cheese  Lunch: protein or salad bar at [employer] with protein/vinegarette or sometimes Western dressing or fast food  Dinner: protein, 1 toast  Snacks: Daytime at work - cookies, vending machine items.  Pt states he has a weakness for sweets and knows he needs to work on this.  Beverages: Water, Milk, Alcohol  Has patient met with a dietitian in the past?: Yes    Being Active:  Being Active Assessed Today: Yes  Exercise:: Currently not exercising  Barrier to exercise: None    Monitoring:  Monitoring Assessed Today: Yes  Did patient bring glucose meter to appointment? : Yes  Blood Glucose Meter: CGM (Freestyle Yonis 3 - will transition to Dexcom G7.)  Times checking blood sugar at home (number): " 5+  Times checking blood sugar at home (per): Day    Freestyle Yonis 3 AGP Report  01/11/2024 to 01/24/2024    % Time CGM is Active 96%    Average Glucose  158    Glucose Management Indicator  (GMI)    7.1%    Glucose Variability                  30%    Time in Ranges:  >250 mg/dL   5%  181-250 mg/dL  23%   mg/dL   72%  54-69 mg/dL   0%  <54 mg/dL   0%     Taking Medications:  Diabetes Medication(s)       Biguanides       metFORMIN (GLUCOPHAGE) 500 MG tablet TAKE 2 TABLETS BY MOUTH 2 TIMES DAILY WITH MEALS       Sodium-Glucose Co-Transporter 2 (SGLT2) Inhibitors       empagliflozin (JARDIANCE) 25 MG TABS tablet TAKE 1 TABLET BY MOUTH DAILY       Sulfonylureas       glimepiride (AMARYL) 2 MG tablet TAKE 2 TABLETS BY MOUTH IN THE MORNING AND 2 TABLETS IN THE EVENING WITH MEALS       Incretin Mimetic Agents       Semaglutide, 2 MG/DOSE, (OZEMPIC, 2 MG/DOSE,) 8 MG/3ML pen INJECT 2MG SUBCUTANEOUSLY ONCE WEEKLY            Taking Medication Assessed Today: Yes  Current Treatments: Non-insulin Injectables, Oral Medication (taken by mouth) (Metformin 1000 mg bid, Glimepiride 4 mg bid, Jardiance 25 mg daily,  Ozempic 2 mg weekly.)  Problems taking diabetes medications regularly?: No  Diabetes medication side effects?: No    Problem Solving:  Problem Solving Assessed Today: Yes  Is the patient at risk for hypoglycemia?: Yes  Hypoglycemia Frequency: Other (0% on Yonis 3 download today.)  Patient carries a carbohydrate source: No    Hypoglycemia Symptoms  Hypoglycemia: None    Hypoglycemia Complications  Hypoglycemia Complications: None    Reducing Risks:  Reducing Risks Assessed Today: Yes  Diabetes Risks: Age over 45 years, Sedentary Lifestyle  CAD Risks: Diabetes Mellitus, Male sex, Obesity, Stress  Has dilated eye exam at least once a year?: Yes  Sees dentist every 6 months?: Yes  Feet checked by healthcare provider in the last year?: Yes    Healthy Coping:  Healthy Coping Assessed Today: Yes  Emotional response to  diabetes: Concern for health and well-being, Acceptance, Ready to learn  Informal Support system:: Family  Stage of change: ACTION (Actively working towards change)  Patient Activation Measure Survey Score:      10/11/2018     1:00 PM   TED Score (Last Two)   TED Raw Score 30   Activation Score 56   TED Level 3       Time Spent: 45 minutes  Encounter Type: Individual    Any diabetes medication dose changes were made via the CDE Protocol per the patient's referring provider. A copy of this encounter was shared with the provider.

## 2024-01-24 NOTE — PATIENT INSTRUCTIONS
-Continue to work on healthy food choices.  Try to work on less snacking.  -Be as active as able.  -Let me know if you have any concerns once you begin using Dexcom G7 vs Freestyle Yonis 3.   -Send me your email for data sharing once you begin using Dexcom G7.  -Continue current dose of Metformin, Jardiance and Glimepiride.  Once your Ozempic is used up we will transition to Mounjaro.  Your dose will be 2.5 mg weekly.  We will increase dose monthly as long as you have no side effects.  Please call or My Chart if you do.   -We will decide on follow up once we see how it goes with the Mounjaro.  -MICHAEL Aviles, [facility] [phone].

## 2024-01-24 NOTE — TELEPHONE ENCOUNTER
Pt was here today for diabetes visit.  He is on maximum dose of Metformin, Glimepiride, Jardiance and Ozempic.  Okay to discontinue Ozempic and try Mounjaro in effort to get A1c to target/weight loss?  If so, please sign pended order.   Thanks!

## 2024-01-25 ENCOUNTER — TELEPHONE (OUTPATIENT)
Dept: FAMILY MEDICINE | Facility: OTHER | Age: 49
End: 2024-01-25

## 2024-01-25 RX ORDER — DILTIAZEM HYDROCHLORIDE 360 MG/1
360 CAPSULE, EXTENDED RELEASE ORAL DAILY
Qty: 90 CAPSULE | Refills: 3 | Status: SHIPPED | OUTPATIENT
Start: 2024-01-25

## 2024-01-25 NOTE — TELEPHONE ENCOUNTER
Received a DENIAL from [employer] for  diltiazem ER (CARDIAZEM LA) 120 MG 24 hr tablet.               Scanned in Epic.

## 2024-01-25 NOTE — TELEPHONE ENCOUNTER
Received an APPROVAL from [facility] for  tirzepatide (MOUNJARO) 2.5 MG/0.5ML pen. Effective dates 1/25/24-1/25/27.

## 2024-01-25 NOTE — TELEPHONE ENCOUNTER
Received a PA request from [employer] for tirzepatide (MOUNJARO) 2.5 MG/0.5ML pen. Submitted on CMM. Waiting for a response.

## 2024-01-25 NOTE — TELEPHONE ENCOUNTER
Please let Bucky know that I put in an order for diltiazem 360mg every day to replace his 120mg + 240mg   Naomi De Santiago MD

## 2024-04-22 ENCOUNTER — TELEPHONE (OUTPATIENT)
Dept: FAMILY MEDICINE | Facility: OTHER | Age: 49
End: 2024-04-22

## 2024-04-22 NOTE — TELEPHONE ENCOUNTER
8:54 AM    Reason for Call: OVERBOOK    Patient is having the following symptoms: Patient needs to be seen for plugged ear. days.    The patient is requesting an appointment for Overbook with .    Was an appointment offered for this call? No  If yes : Appointment type              Date    Preferred method for responding to this message: Telephone Call  What is your phone number ?  [phone]    If we cannot reach you directly, may we leave a detailed response at the number you provided? Yes    Can this message wait until your PCP/provider returns, if unavailable today? Provider is out today    Trang Glover

## 2024-04-23 ENCOUNTER — OFFICE VISIT (OUTPATIENT)
Dept: FAMILY MEDICINE | Facility: OTHER | Age: 49
End: 2024-04-23
Attending: FAMILY MEDICINE
Payer: COMMERCIAL

## 2024-04-23 VITALS
OXYGEN SATURATION: 98 % | WEIGHT: 296.8 LBS | DIASTOLIC BLOOD PRESSURE: 82 MMHG | TEMPERATURE: 98.3 F | BODY MASS INDEX: 41.55 KG/M2 | RESPIRATION RATE: 17 BRPM | HEART RATE: 69 BPM | HEIGHT: 71 IN | SYSTOLIC BLOOD PRESSURE: 140 MMHG

## 2024-04-23 DIAGNOSIS — H66.90 ACUTE OTITIS MEDIA, UNSPECIFIED OTITIS MEDIA TYPE: ICD-10-CM

## 2024-04-23 DIAGNOSIS — J32.9 CHRONIC SINUSITIS, UNSPECIFIED LOCATION: Primary | ICD-10-CM

## 2024-04-23 PROCEDURE — 99213 OFFICE O/P EST LOW 20 MIN: CPT | Performed by: FAMILY MEDICINE

## 2024-04-23 ASSESSMENT — PAIN SCALES - GENERAL: PAINLEVEL: NO PAIN (0)

## 2024-04-23 NOTE — PROGRESS NOTES
Immunization Declination         Shahzad Cortes  1938  Medical Record Number: [id number]  PRIMARY CARE PROVIDER: Deo Clement MD    My healthcare provider has advised that Shahzad Cortes should be receiving the following vaccine(s).    _____Shingles vaccine DECLINED     I have read the Centers for Disease Control and Prevention's (CDC) Vaccine Information Sheet(s) explaining the vaccine(s) and the disease(s) they prevent. I have had the opportunity to discuss these with my health care provider, who has answered all my questions regarding the recommended vaccine(s). I understand the following:     The purpose of and the need for the recommended vaccine(s)  The risks and benefits of the recommended vaccine(s)   If I do not receive the vaccine(s), the consequences may include:   -shayna the illness the vaccine should prevent  -transmitting the disease to others  -the need for me to stay out of work during disease outbreaks    Nevertheless I have decided to decline the vaccine(s) recommended for me, as indicated above, by my initials before the declined vaccines(s).    I know that I may re-address this issue with my health care provider at any time, and that I may change my mind and accept vaccination(s) in the future.    I acknowledge that I have read this document in its entirety and fully understand it.      ________________________________________     ____________________    Patient Signature                                               Date      ________________________________________     _____________________                         Witness          Date     "  Assessment & Plan     Chronic sinusitis, unspecified location  Discussed hypertonic nasal saline irrigation as well.  - amoxicillin-clavulanate (AUGMENTIN) 875-125 MG tablet; Take 1 tablet by mouth 2 times daily for 10 days  - Adult ENT  Referral; Future    Acute otitis media, unspecified otitis media type  - amoxicillin-clavulanate (AUGMENTIN) 875-125 MG tablet; Take 1 tablet by mouth 2 times daily for 10 days          Dilan Lawler is a 48 year old, presenting for the following health issues:  Ear Problem        4/23/2024     2:47 PM   Additional Questions   Roomed by Rhoda Tariq   Accompanied by self         4/23/2024     2:47 PM   Patient Reported Additional Medications   Patient reports taking the following new medications none       Chronic sinus congestion.  Flaring.  Now with R > L ear pressure/muffled.    History of Present Illness       Reason for visit:  Plugged ears  Symptom onset:  1-2 weeks ago  Symptoms include:  Hearing loss, stuffed nose and ears  Symptom intensity:  Moderate  Symptom progression:  Staying the same  Had these symptoms before:  Yes  Has tried/received treatment for these symptoms:  No  What makes it worse:  Waking up in the morning seems to be the worst time.  What makes it better:  Clearing or blowing my nose for the first time in the morning.    He eats 0-1 servings of fruits and vegetables daily.He consumes 1 sweetened beverage(s) daily.He exercises with enough effort to increase his heart rate 10 to 19 minutes per day.  He exercises with enough effort to increase his heart rate 3 or less days per week.   He is taking medications regularly.             Objective    BP (!) 140/82   Pulse 69   Temp 98.3  F (36.8  C) (Tympanic)   Resp 17   Ht 1.791 m (5' 10.5\")   Wt 134.6 kg (296 lb 12.8 oz)   SpO2 98%   BMI 41.98 kg/m    Body mass index is 41.98 kg/m .  Physical Exam  Constitutional:       General: He is not in acute distress.     Appearance: Normal " appearance.   HENT:      Head: Normocephalic and atraumatic.      Left Ear: Tympanic membrane and ear canal normal.      Ears:      Comments: Right TM dark pain, scattered light reflex     Nose:      Comments: Thick yellow crusting mucus seen in Right >> Left nares     Mouth/Throat:      Mouth: Mucous membranes are moist.      Pharynx: Oropharynx is clear.   Eyes:      Conjunctiva/sclera: Conjunctivae normal.      Pupils: Pupils are equal, round, and reactive to light.   Cardiovascular:      Rate and Rhythm: Normal rate and regular rhythm.      Heart sounds: Normal heart sounds. No murmur heard.  Pulmonary:      Effort: Pulmonary effort is normal.      Breath sounds: Normal breath sounds.   Lymphadenopathy:      Cervical: No cervical adenopathy.   Neurological:      Mental Status: He is alert and oriented to person, place, and time.                Signed Electronically by: SHANTI GONZALEZ DO

## 2024-06-10 DIAGNOSIS — E11.9 TYPE 2 DIABETES MELLITUS WITHOUT COMPLICATION, WITHOUT LONG-TERM CURRENT USE OF INSULIN (H): ICD-10-CM

## 2024-06-10 RX ORDER — EMPAGLIFLOZIN 25 MG/1
TABLET, FILM COATED ORAL
Qty: 90 TABLET | Refills: 3 | Status: SHIPPED | OUTPATIENT
Start: 2024-06-10

## 2024-06-18 ENCOUNTER — OFFICE VISIT (OUTPATIENT)
Dept: OTOLARYNGOLOGY | Facility: OTHER | Age: 49
End: 2024-06-18
Attending: FAMILY MEDICINE
Payer: COMMERCIAL

## 2024-06-18 VITALS
TEMPERATURE: 99 F | RESPIRATION RATE: 18 BRPM | OXYGEN SATURATION: 95 % | WEIGHT: 285 LBS | HEIGHT: 71 IN | BODY MASS INDEX: 39.9 KG/M2 | SYSTOLIC BLOOD PRESSURE: 138 MMHG | HEART RATE: 78 BPM | DIASTOLIC BLOOD PRESSURE: 74 MMHG

## 2024-06-18 DIAGNOSIS — R09.82 POST-NASAL DRAINAGE: ICD-10-CM

## 2024-06-18 DIAGNOSIS — J34.2 DNS (DEVIATED NASAL SEPTUM): Primary | ICD-10-CM

## 2024-06-18 DIAGNOSIS — J32.9 CHRONIC SINUSITIS, UNSPECIFIED LOCATION: ICD-10-CM

## 2024-06-18 DIAGNOSIS — J34.3 NASAL TURBINATE HYPERTROPHY: ICD-10-CM

## 2024-06-18 PROCEDURE — 99214 OFFICE O/P EST MOD 30 MIN: CPT | Mod: 25 | Performed by: PHYSICIAN ASSISTANT

## 2024-06-18 PROCEDURE — 31231 NASAL ENDOSCOPY DX: CPT | Performed by: PHYSICIAN ASSISTANT

## 2024-06-18 PROCEDURE — 86003 ALLG SPEC IGE CRUDE XTRC EA: CPT | Mod: 90 | Performed by: PHYSICIAN ASSISTANT

## 2024-06-18 PROCEDURE — 82785 ASSAY OF IGE: CPT | Mod: 90 | Performed by: PHYSICIAN ASSISTANT

## 2024-06-18 PROCEDURE — 36415 COLL VENOUS BLD VENIPUNCTURE: CPT | Performed by: PHYSICIAN ASSISTANT

## 2024-06-18 RX ORDER — BUDESONIDE 0.5 MG/2ML
INHALANT ORAL
Qty: 120 ML | Refills: 1 | Status: SHIPPED | OUTPATIENT
Start: 2024-06-18

## 2024-06-18 ASSESSMENT — PAIN SCALES - GENERAL: PAINLEVEL: NO PAIN (0)

## 2024-06-18 NOTE — PATIENT INSTRUCTIONS
Start Budesonide rinses   Rinse 1-2 times daily.   Start Qnasl 2 sprays to each nostril daily.   Complete allergy panel. Results take about 10-14 days.     Complete Sinus CT.   Consider Septoplasty and turbinate reduction.   Obtain Nasal Ayr gel- Apply to nostrils at bedtime for dryness.     Thank you for allowing Susie Bhakta PA-C and our ENT team to participate in your care.  If your medications are too expensive, please give the nurse a call.  We can possibly change this medication.  If you have a scheduling or an appointment question please contact our Health Unit Coordinator at [phone], Ext. [phone].    ALL nursing questions or concerns can be directed to your ENT nurse at: [phone] Camelia      Budesonide nasal saline irrigation per instructions:  -Obtain [facility] Sinus rinse over the counter.    -Use warm distilled water and 2 packets of the salt solution that comes with the bottle, dissolve in bottle up to the 240 mL vadim.  -Add 1 vial of budesonide.  -Irrigate each side of your nose leaning over the sink, using 1/3 to 1/2 the volume of the bottle in each nostril every irrigation.  Irrigate 2 times daily.  -If additional rinses are needed/recommended, you may use the plan [facility] Sinus irrigation without the use of added budesonide

## 2024-06-18 NOTE — PROGRESS NOTES
Otolaryngology Consultation    Patient: Bucky Franco  : 1975    Patient presents with:  Sinusitis: Chronic sinusititis Shaq Guo DO referring      HPI:  Bucky Franco is a 48 year old male seen today for Sinusitis and AOM. He was started on Augmentin on 24. Bucky reports ongoing for recurrent sinusitis over the last 25+ years. He has felt ongoing chronic nasal congestion, drainage, obstruction.   He has been using rinses as needed. He does try to use rinses BID. Completed course of Abx.   He had tried Flonase years ago w/ some slight improvement. He has felt symptoms generally year round and has mild allergy symptoms.       Resides in a house without basement  There is no water or mold  Carpet in bedroom - no  Heat in home- In floor  Animals- 2 dogs, cat.   Family hx of allergies - sibling with some mild symptoms.   Past trials of medications - Flonase.   No hx of nasal trauma or injury.   No prior nasal or sinus  Hx of T&A.   No imaging of sinuses.       Current Outpatient Rx   Medication Sig Dispense Refill    aspirin 81 MG tablet Take 1 tablet (81 mg) by mouth daily 90 tablet 3    blood glucose (CONTOUR NEXT TEST) test strip Use to test blood sugar 2 times daily. 100 each 6    Continuous Blood Gluc Sensor (DEXCOM G7 SENSOR) MISC Change every 10 days. 3 each 5    diltiazem ER (TIAZAC) 360 MG 24 hr ER beaded capsule Take 1 capsule (360 mg) by mouth daily 90 capsule 3    empagliflozin (JARDIANCE) 25 MG TABS tablet TAKE 1 TABLET BY MOUTH DAILY 90 tablet 3    glimepiride (AMARYL) 2 MG tablet TAKE 2 TABLETS BY MOUTH IN THE MORNING AND 2 TABLETS IN THE EVENING WITH MEALS 360 tablet 3    lisinopril (ZESTRIL) 40 MG tablet TAKE 1 TABLET BY MOUTH DAILY 90 tablet 3    metFORMIN (GLUCOPHAGE) 500 MG tablet TAKE 2 TABLETS BY MOUTH 2 TIMES DAILY WITH MEALS 120 tablet 5    metoprolol succinate ER (TOPROL XL) 25 MG 24 hr tablet TAKE ONE TABLET BY MOUTH DAILY 90 tablet 3    omeprazole (PRILOSEC) 20 MG DR capsule  "TAKE 1 CAPSULE BY MOUTH DAILY. TAKE 1/2 HOUR BEFORE YOUR FIRST MEAL OF THE DAY. 90 capsule 2    rosuvastatin (CRESTOR) 10 MG tablet TAKE 1 TABLET BY MOUTH DAILY 90 tablet 3    tirzepatide (MOUNJARO) 10 MG/0.5ML pen Inject 10 mg Subcutaneous every 7 days 2 mL 1       Allergies: Hctz [hydrochlorothiazide]     Past Medical History:   Diagnosis Date    Diabetes (H)     GERD (gastroesophageal reflux disease) 05/18/2012    Gout, unspecified 09/07/2005    Hyperlipidemia 05/18/2012    Hypertension        Past Surgical History:   Procedure Laterality Date    ABDOMEN SURGERY      lap band    ADENOIDECTOMY      APPENDECTOMY      CIRCUMCISION      lap band  2014    Onychomycoses      TONSILLECTOMY         ENT family history reviewed    Social History     Tobacco Use    Smoking status: Former     Types: Dip, chew, snus or snuff    Smokeless tobacco: Former     Types: Snuff     Quit date: 10/1/2015   Substance Use Topics    Alcohol use: Yes     Comment: Rarely    Drug use: No       Review of Systems  ROS: 10 point ROS neg other than the symptoms noted above in the HPI     Physical Exam  BP (!) 166/68 (BP Location: Right arm, Patient Position: Sitting, Cuff Size: Adult Large)   Pulse 78   Temp 99  F (37.2  C) (Tympanic)   Resp 18   Ht 1.803 m (5' 11\")   Wt 129.3 kg (285 lb)   SpO2 95%   BMI 39.75 kg/m      General - The patient is well nourished and well developed, and appears to have good nutritional status.  Alert and oriented to person and place, answers questions and cooperates with examination appropriately.   Head and Face - Normocephalic and atraumatic, with no gross asymmetry noted.  The facial nerve is intact, with strong symmetric movements.  Voice and Breathing - The patient was breathing comfortably without the use of accessory muscles. There was no wheezing, stridor, or stertor.  The patients voice was clear and strong, and had appropriate pitch and quality.  Ears -The external auditory canals are patent, the " tympanic membranes are intact without effusion, retraction or mass.  Bony landmarks are intact.  Eyes - Extraocular movements intact, and the pupils were reactive to light.  Sclera were not icteric or injected, conjunctiva were pink and moist.  Mouth - Examination of the oral cavity showed pink, healthy oral mucosa. No lesions or ulcerations noted.  The tongue was mobile and midline, and the dentition were in good condition.    Throat - The walls of the oropharynx were smooth, pink, moist, symmetric, and had no lesions or ulcerations.  The tonsillar pillars and soft palate were symmetric.  The uvula was midline on elevation.    Neck - Normal midline excursion of the laryngotracheal complex during swallowing.  Full range of motion on passive movement.  Palpation of the occipital, submental, submandibular, internal jugular chain, and supraclavicular nodes did not demonstrate any abnormal lymph nodes or masses.  Palpation of the thyroid was soft and smooth, with no nodules or goiter appreciated.  The trachea was mobile and midline.  Nose - External contour is symmetric, no gross deflection or scars.  Nasal mucosa is pink and moist with no abnormal mucus.  The septum and turbinates were evaluated: DNS to Right.       To evaluate the nose due to CRS, I performed rigid nasal endoscopy. The nose was anesthetized with home afrin or topical lidocaine and neosynephrine in the office.    I began with the LEFT side using a 0 degree rigid nasal endoscope, and then similarly examined the RIGHT side    Findings:  Inferior turbinates:  Edematous, boggy  Septum has deviation with the limits nasal exam with rigid endoscopy.  On the right.  Peds Flex was used to visualize eustachian tube which was patent.  Nasopharynx is clear.  Left has several small stinky eye present.  Middle turbinate and middle meatus:  No purulence, no polyposis, no synechiae    Mucosa is  healthy throughout without polyps nor polypoid degeneration  Superior  meatus is clear  Frontal recess clear  Sphenoethmoidal clear  Nasopharynx is clear    The patient tolerated procedure well      Impression and Plan- Bucky Franco is a 48 year old male with:    ICD-10-CM    1. DNS (deviated nasal septum)  J34.2 CT Sinus w/o Contrast     budesonide (PULMICORT) 0.5 MG/2ML neb solution     beclomethasone (QNASL) 80 MCG/ACT nasal aerosol      2. Chronic sinusitis, unspecified location  J32.9 lidocaine 2%-oxymetazoline 0.025% nasal solution 2 spray     Adult ENT  Referral     Inhalent Panel [place] (Serolab)     Total IgE (Serolab)     CT Sinus w/o Contrast     budesonide (PULMICORT) 0.5 MG/2ML neb solution     beclomethasone (QNASL) 80 MCG/ACT nasal aerosol     [place]halent Panel [place] (Serolab)     Total IgE (Serolab)      3. Nasal turbinate hypertrophy  J34.3 CT Sinus w/o Contrast     budesonide (PULMICORT) 0.5 MG/2ML neb solution     beclomethasone (QNASL) 80 MCG/ACT nasal aerosol      4. Post-nasal drainage  R09.82 CT Sinus w/o Contrast     budesonide (PULMICORT) 0.5 MG/2ML neb solution     beclomethasone (QNASL) 80 MCG/ACT nasal aerosol            Start Budesonide rinses   Rinse 1-2 times daily.   Start Qnasl 2 sprays to each nostril daily.   Complete allergy panel. Results take about 10-14 days.     Complete Sinus CT.   Consider Septoplasty and turbinate reduction.   Obtain Nasal Ayr gel- Apply to nostrils at bedtime for dryness.     Budesonide nasal saline irrigation per instructions:  -Obtain Negro Med Sinus rinse over the counter.    -Use warm distilled water and 2 packets of the salt solution that comes with the bottle, dissolve in bottle up to the 240 mL vadim.  -Add 1 vial of budesonide.  -Irrigate each side of your nose leaning over the sink, using 1/3 to 1/2 the volume of the bottle in each nostril every irrigation.  Irrigate 2 times daily.  -If additional rinses are needed/recommended, you may use the plan Negro Med Sinus irrigation without the use of added  budesonide    Indications for allergy testing include:   1) Confirm suspicion of allergic rhinitis due to inhalant allergies  2) Identify the offending allergen to determine specific mode of treatment  3) In the case of chronic rhinosinusitis: when symptoms are not controlled by avoidance and pharmacotherapy  4) In the Asthma patient when exacerbations may be due to perennial allergen exposure  5) Suspect food allergy  6) Otitis Media, chronic rhinitis, atopic dermatitis, Meniere disease, headache, pharyngitis or eye symptoms        Susie Bhakta PA-C  ENT  [facility], [place]

## 2024-06-18 NOTE — LETTER
2024      Bucky Franco  [address]  [place] [address]      Dear Colleague,    Thank you for referring your patient, Bucky Franco, to the [facility]. Please see a copy of my visit note below.    Otolaryngology Consultation    Patient: Bucky Franco  : 1975    Patient presents with:  Sinusitis: Chronic sinusititis Shaq Guo DO referring      HPI:  Bucky Franco is a 48 year old male seen today for Sinusitis and AOM. He was started on Augmentin on 24. Bucky reports ongoing for recurrent sinusitis over the last 25+ years. He has felt ongoing chronic nasal congestion, drainage, obstruction.   He has been using rinses as needed. He does try to use rinses BID. Completed course of Abx.   He had tried Flonase years ago w/ some slight improvement. He has felt symptoms generally year round and has mild allergy symptoms.       Resides in a house without basement  There is no water or mold  Carpet in bedroom - no  Heat in home- In floor  Animals- 2 dogs, cat.   Family hx of allergies - sibling with some mild symptoms.   Past trials of medications - Flonase.   No hx of nasal trauma or injury.   No prior nasal or sinus  Hx of T&A.   No imaging of sinuses.       Current Outpatient Rx   Medication Sig Dispense Refill     aspirin 81 MG tablet Take 1 tablet (81 mg) by mouth daily 90 tablet 3     blood glucose (CONTOUR NEXT TEST) test strip Use to test blood sugar 2 times daily. 100 each 6     Continuous Blood Gluc Sensor (DEXCOM G7 SENSOR) MISC Change every 10 days. 3 each 5     diltiazem ER (TIAZAC) 360 MG 24 hr ER beaded capsule Take 1 capsule (360 mg) by mouth daily 90 capsule 3     empagliflozin (JARDIANCE) 25 MG TABS tablet TAKE 1 TABLET BY MOUTH DAILY 90 tablet 3     glimepiride (AMARYL) 2 MG tablet TAKE 2 TABLETS BY MOUTH IN THE MORNING AND 2 TABLETS IN THE EVENING WITH MEALS 360 tablet 3     lisinopril (ZESTRIL) 40 MG tablet TAKE 1 TABLET BY MOUTH DAILY 90 tablet 3      "metFORMIN (GLUCOPHAGE) 500 MG tablet TAKE 2 TABLETS BY MOUTH 2 TIMES DAILY WITH MEALS 120 tablet 5     metoprolol succinate ER (TOPROL XL) 25 MG 24 hr tablet TAKE ONE TABLET BY MOUTH DAILY 90 tablet 3     omeprazole (PRILOSEC) 20 MG DR capsule TAKE 1 CAPSULE BY MOUTH DAILY. TAKE 1/2 HOUR BEFORE YOUR FIRST MEAL OF THE DAY. 90 capsule 2     rosuvastatin (CRESTOR) 10 MG tablet TAKE 1 TABLET BY MOUTH DAILY 90 tablet 3     tirzepatide (MOUNJARO) 10 MG/0.5ML pen Inject 10 mg Subcutaneous every 7 days 2 mL 1       Allergies: Hctz [hydrochlorothiazide]     Past Medical History:   Diagnosis Date     Diabetes (H)      GERD (gastroesophageal reflux disease) 05/18/2012     Gout, unspecified 09/07/2005     Hyperlipidemia 05/18/2012     Hypertension        Past Surgical History:   Procedure Laterality Date     ABDOMEN SURGERY      lap band     ADENOIDECTOMY       APPENDECTOMY       CIRCUMCISION       lap band  2014     Onychomycoses       TONSILLECTOMY         ENT family history reviewed    Social History     Tobacco Use     Smoking status: Former     Types: Dip, chew, snus or snuff     Smokeless tobacco: Former     Types: Snuff     Quit date: 10/1/2015   Substance Use Topics     Alcohol use: Yes     Comment: Rarely     Drug use: No       Review of Systems  ROS: 10 point ROS neg other than the symptoms noted above in the HPI     Physical Exam  BP (!) 166/68 (BP Location: Right arm, Patient Position: Sitting, Cuff Size: Adult Large)   Pulse 78   Temp 99  F (37.2  C) (Tympanic)   Resp 18   Ht 1.803 m (5' 11\")   Wt 129.3 kg (285 lb)   SpO2 95%   BMI 39.75 kg/m      General - The patient is well nourished and well developed, and appears to have good nutritional status.  Alert and oriented to person and place, answers questions and cooperates with examination appropriately.   Head and Face - Normocephalic and atraumatic, with no gross asymmetry noted.  The facial nerve is intact, with strong symmetric movements.  Voice and " Breathing - The patient was breathing comfortably without the use of accessory muscles. There was no wheezing, stridor, or stertor.  The patients voice was clear and strong, and had appropriate pitch and quality.  Ears -The external auditory canals are patent, the tympanic membranes are intact without effusion, retraction or mass.  Bony landmarks are intact.  Eyes - Extraocular movements intact, and the pupils were reactive to light.  Sclera were not icteric or injected, conjunctiva were pink and moist.  Mouth - Examination of the oral cavity showed pink, healthy oral mucosa. No lesions or ulcerations noted.  The tongue was mobile and midline, and the dentition were in good condition.    Throat - The walls of the oropharynx were smooth, pink, moist, symmetric, and had no lesions or ulcerations.  The tonsillar pillars and soft palate were symmetric.  The uvula was midline on elevation.    Neck - Normal midline excursion of the laryngotracheal complex during swallowing.  Full range of motion on passive movement.  Palpation of the occipital, submental, submandibular, internal jugular chain, and supraclavicular nodes did not demonstrate any abnormal lymph nodes or masses.  Palpation of the thyroid was soft and smooth, with no nodules or goiter appreciated.  The trachea was mobile and midline.  Nose - External contour is symmetric, no gross deflection or scars.  Nasal mucosa is pink and moist with no abnormal mucus.  The septum and turbinates were evaluated: DNS to Right.       To evaluate the nose due to CRS, I performed rigid nasal endoscopy. The nose was anesthetized with home afrin or topical lidocaine and neosynephrine in the office.    I began with the LEFT side using a 0 degree rigid nasal endoscope, and then similarly examined the RIGHT side    Findings:  Inferior turbinates:  Edematous, boggy  Septum has deviation with the limits nasal exam with rigid endoscopy.  On the right.  Peds Flex was used to visualize  eustachian tube which was patent.  Nasopharynx is clear.  Left has several small stinky eye present.  Middle turbinate and middle meatus:  No purulence, no polyposis, no synechiae    Mucosa is  healthy throughout without polyps nor polypoid degeneration  Superior meatus is clear  Frontal recess clear  Sphenoethmoidal clear  Nasopharynx is clear    The patient tolerated procedure well      Impression and Plan- Bucky Franco is a 48 year old male with:    ICD-10-CM    1. DNS (deviated nasal septum)  J34.2 CT Sinus w/o Contrast     budesonide (PULMICORT) 0.5 MG/2ML neb solution     beclomethasone (QNASL) 80 MCG/ACT nasal aerosol      2. Chronic sinusitis, unspecified location  J32.9 lidocaine 2%-oxymetazoline 0.025% nasal solution 2 spray     Adult ENT  Referral     [place] (Serolab)     Total IgE (Serolab)     CT Sinus w/o Contrast     budesonide (PULMICORT) 0.5 MG/2ML neb solution     beclomethasone (QNASL) 80 MCG/ACT nasal aerosol     [place] Panel [place] (Serolab)     Total IgE (Serolab)      3. Nasal turbinate hypertrophy  J34.3 CT Sinus w/o Contrast     budesonide (PULMICORT) 0.5 MG/2ML neb solution     beclomethasone (QNASL) 80 MCG/ACT nasal aerosol      4. Post-nasal drainage  R09.82 CT Sinus w/o Contrast     budesonide (PULMICORT) 0.5 MG/2ML neb solution     beclomethasone (QNASL) 80 MCG/ACT nasal aerosol            Start Budesonide rinses   Rinse 1-2 times daily.   Start Qnasl 2 sprays to each nostril daily.   Complete allergy panel. Results take about 10-14 days.     Complete Sinus CT.   Consider Septoplasty and turbinate reduction.   Obtain Nasal Ayr gel- Apply to nostrils at bedtime for dryness.     Budesonide nasal saline irrigation per instructions:  -Obtain [facility] Sinus rinse over the counter.    -Use warm distilled water and 2 packets of the salt solution that comes with the bottle, dissolve in bottle up to the 240 mL vadim.  -Add 1 vial of budesonide.  -Irrigate each  side of your nose leaning over the sink, using 1/3 to 1/2 the volume of the bottle in each nostril every irrigation.  Irrigate 2 times daily.  -If additional rinses are needed/recommended, you may use the plan [facility] Sinus irrigation without the use of added budesonide    Indications for allergy testing include:   1) Confirm suspicion of allergic rhinitis due to inhalant allergies  2) Identify the offending allergen to determine specific mode of treatment  3) In the case of chronic rhinosinusitis: when symptoms are not controlled by avoidance and pharmacotherapy  4) In the Asthma patient when exacerbations may be due to perennial allergen exposure  5) Suspect food allergy  6) Otitis Media, chronic rhinitis, atopic dermatitis, Meniere disease, headache, pharyngitis or eye symptoms        Susie Bhakta PA-C  ENT  [facility], [place]      Again, thank you for allowing me to participate in the care of your patient.        Sincerely,        Susie Bhakta PA-C

## 2024-06-19 ENCOUNTER — HOSPITAL ENCOUNTER (OUTPATIENT)
Dept: CT IMAGING | Facility: HOSPITAL | Age: 49
Discharge: HOME | End: 2024-06-19
Attending: PHYSICIAN ASSISTANT | Admitting: PHYSICIAN ASSISTANT
Payer: COMMERCIAL

## 2024-06-19 ENCOUNTER — TELEPHONE (OUTPATIENT)
Dept: OTOLARYNGOLOGY | Facility: OTHER | Age: 49
End: 2024-06-19

## 2024-06-19 DIAGNOSIS — J34.2 DNS (DEVIATED NASAL SEPTUM): ICD-10-CM

## 2024-06-19 DIAGNOSIS — R09.82 POST-NASAL DRAINAGE: ICD-10-CM

## 2024-06-19 DIAGNOSIS — J34.3 NASAL TURBINATE HYPERTROPHY: ICD-10-CM

## 2024-06-19 DIAGNOSIS — J32.9 CHRONIC SINUSITIS, UNSPECIFIED LOCATION: ICD-10-CM

## 2024-06-19 PROCEDURE — 70486 CT MAXILLOFACIAL W/O DYE: CPT

## 2024-06-19 NOTE — TELEPHONE ENCOUNTER
Received an APPROVAL from [facility] for  beclomethasone (QNASL) 80 MCG/ACT nasal aerosol. Effective dates 6/19/24-6/19/25.

## 2024-06-19 NOTE — TELEPHONE ENCOUNTER
Received a PA request from [facility] for beclomethasone (QNASL) 80 MCG/ACT nasal aerosol. Submitted on CMM. Waiting for a response.

## 2024-06-24 DIAGNOSIS — E11.9 TYPE 2 DIABETES MELLITUS WITHOUT COMPLICATION, WITHOUT LONG-TERM CURRENT USE OF INSULIN (H): ICD-10-CM

## 2024-06-24 NOTE — TELEPHONE ENCOUNTER
Metformin      Last Written Prescription Date:  12/29/23  Last Fill Quantity: 120,   # refills: 5  Last Office Visit: 4/23/24  Future Office visit:    Next 5 appointments (look out 90 days)      Jul 19, 2024 7:30 AM  (Arrive by 7:15 AM)  Provider Visit with Naomi De Santiago MD  [facility] [place] ([facility] [place] ) [address]  [place] [address]  [phone]             Routing refill request to provider for review/approval because:

## 2024-07-01 DIAGNOSIS — E11.65 TYPE 2 DIABETES MELLITUS WITH HYPERGLYCEMIA, WITHOUT LONG-TERM CURRENT USE OF INSULIN (H): ICD-10-CM

## 2024-07-01 RX ORDER — TIRZEPATIDE 10 MG/.5ML
INJECTION, SOLUTION SUBCUTANEOUS
Qty: 2 ML | Refills: 2 | Status: SHIPPED | OUTPATIENT
Start: 2024-07-01 | End: 2024-07-19 | Stop reason: DRUGHIGH

## 2024-07-02 LAB
SCANNED LAB RESULT: NORMAL
SCANNED LAB RESULT: NORMAL

## 2024-07-17 NOTE — PROGRESS NOTES
Assessment & Plan     Type 2 diabetes mellitus without complication, without long-term current use of insulin (H)  A1c improved to 6.6 today from 7.0  - Hemoglobin A1c; Future  - increase - tirzepatide (MOUNJARO) 15 MG/0.5ML pen; Inject 15 mg subcutaneously every 7 days  - Hemoglobin A1c  - c/w metformin / glimepiride  - c/w jardiance  - watch for low blood sugar, consideration for reduction of glimepiride    Benign essential hypertension  BP above goal today, but previous values at goal. No change in HTN today  - c/w diltiazem   - c/w metoprolol succinate   - c/w lisinopril   - consideration for changing metoprolol (and diltiazem) to coreg if BP continues to run high     Morbid obesity (H)  steady weight loss  - increased mounjaro     Special screening for malignant neoplasms, colon  - Colonoscopy Screening  Referral; Future      The longitudinal plan of care for the diagnosis(es)/condition(s) as documented were addressed during this visit. Due to the added complexity in care, I will continue to support Bucky in the subsequent management and with ongoing continuity of care.    See Patient Instructions    Next visit 1/20/205 for annual exam and labs    Subjective   Bucky is a 49 year old, presenting for the following health issues:  Diabetes, Hypertension, and Lipids    History of Present Illness       Diabetes:   He presents for follow up of diabetes.   He is checking home blood glucose with a continuous glucose monitor.   He checks blood glucose before and after meals.  Blood glucose is sometimes over 200 and sometimes under 70. He is aware of hypoglycemia symptoms including shakiness and weakness.   He is concerned about blood sugar frequently over 200 and other.   He is having numbness in feet, burning in feet, weight loss and weight gain.            Hypertension: He presents for follow up of hypertension.  He does not check blood pressure  regularly outside of the clinic. Outside blood pressures have  been over 140/90. He follows a low salt diet.     Reason for visit:  A1c check up    He eats 0-1 servings of fruits and vegetables daily.He consumes 0 sweetened beverage(s) daily.He exercises with enough effort to increase his heart rate 10 to 19 minutes per day.  He exercises with enough effort to increase his heart rate 3 or less days per week.   He is taking medications regularly.       Diabetes Follow-up    How often are you checking your blood sugar? Continuous glucose monitor  What time of day are you checking your blood sugars (select all that apply)?  Before and after meals  Have you had any blood sugars above 200?  Yes   Have you had any blood sugars below 70?  Yes   What symptoms do you notice when your blood sugar is low?  Shaky and Weak  What concerns do you have today about your diabetes? None and Blood sugar is often over 200   Do you have any of these symptoms? (Select all that apply)  No numbness or tingling in feet.  No redness, sores or blisters on feet.  No complaints of excessive thirst.  No reports of blurry vision.  No significant changes to weight.    Follows with [facility]   Changed to Mounjaro - took months to get back to where he was with ozempic. Would like to go up on mounjaro   - limited weight loss   - running higher on CGM  - average A1c 7.6    Hypertension Follow-up    Do you check your blood pressure regularly outside of the clinic? No   Are you following a low salt diet? Yes  Are your blood pressures ever more than 140 on the top number (systolic) OR more   than 90 on the bottom number (diastolic), for example 140/90? Yes    - diltiazem 360  - metoprolol succinate 25mg   - lisinopril 40mg     BP Readings from Last 6 Encounters:   07/19/24 (!) 158/80   06/18/24 138/74   04/23/24 (!) 140/82   01/24/24 138/85   01/19/24 128/72   07/27/23 150/60         BP Readings from Last 2 Encounters:   07/19/24 (!) 158/80   06/18/24 138/74     Hemoglobin A1C (%)   Date Value   01/19/2024 7.0 (H)  "  07/20/2023 7.3 (H)   05/16/2022 7.5 (A)   11/15/2021 6.9 (A)     LDL Cholesterol Calculated (mg/dL)   Date Value   01/19/2024 66   08/15/2022 56   07/13/2020 58   01/10/2019 50     # Weight    - does not have CPAP, did not meet criteria     Wt Readings from Last 4 Encounters:   07/19/24 128.1 kg (282 lb 4.8 oz)   06/18/24 129.3 kg (285 lb)   04/23/24 134.6 kg (296 lb 12.8 oz)   01/24/24 132.5 kg (292 lb 3.2 oz)       # colon cancer screening:- order placed for colonoscopy         Review of Systems  Constitutional, HEENT, cardiovascular, pulmonary, gi and gu systems are negative, except as otherwise noted.      Objective    BP (!) 158/80   Pulse 68   Temp 98.1  F (36.7  C) (Tympanic)   Resp 17   Ht 1.803 m (5' 11\")   Wt 128.1 kg (282 lb 4.8 oz)   SpO2 98%   BMI 39.37 kg/m    Body mass index is 39.37 kg/m .  Physical Exam  Constitutional:       General: He is not in acute distress.     Appearance: He is not ill-appearing.   Cardiovascular:      Rate and Rhythm: Normal rate and regular rhythm.      Heart sounds: No murmur heard.  Pulmonary:      Effort: Pulmonary effort is normal. No respiratory distress.      Breath sounds: No wheezing or rales.   Neurological:      Mental Status: He is alert.          Results for orders placed or performed in visit on 07/19/24 (from the past 24 hour(s))   Hemoglobin A1c   Result Value Ref Range    Estimated Average Glucose 143 mg/dL    Hemoglobin A1C 6.6 (H) <5.7 %           Signed Electronically by: Naomi De Santiago MD    "

## 2024-07-18 DIAGNOSIS — K21.9 GASTROESOPHAGEAL REFLUX DISEASE, UNSPECIFIED WHETHER ESOPHAGITIS PRESENT: ICD-10-CM

## 2024-07-19 ENCOUNTER — OFFICE VISIT (OUTPATIENT)
Dept: FAMILY MEDICINE | Facility: OTHER | Age: 49
End: 2024-07-19
Attending: FAMILY MEDICINE
Payer: COMMERCIAL

## 2024-07-19 VITALS
TEMPERATURE: 98.1 F | OXYGEN SATURATION: 98 % | RESPIRATION RATE: 17 BRPM | WEIGHT: 282.3 LBS | HEIGHT: 71 IN | HEART RATE: 68 BPM | BODY MASS INDEX: 39.52 KG/M2 | DIASTOLIC BLOOD PRESSURE: 80 MMHG | SYSTOLIC BLOOD PRESSURE: 158 MMHG

## 2024-07-19 DIAGNOSIS — Z12.11 SPECIAL SCREENING FOR MALIGNANT NEOPLASMS, COLON: ICD-10-CM

## 2024-07-19 DIAGNOSIS — E66.01 MORBID OBESITY (H): ICD-10-CM

## 2024-07-19 DIAGNOSIS — E11.9 TYPE 2 DIABETES MELLITUS WITHOUT COMPLICATION, WITHOUT LONG-TERM CURRENT USE OF INSULIN (H): Primary | ICD-10-CM

## 2024-07-19 DIAGNOSIS — I10 BENIGN ESSENTIAL HYPERTENSION: ICD-10-CM

## 2024-07-19 LAB
EST. AVERAGE GLUCOSE BLD GHB EST-MCNC: 143 MG/DL
HBA1C MFR BLD: 6.6 %

## 2024-07-19 PROCEDURE — 83036 HEMOGLOBIN GLYCOSYLATED A1C: CPT | Performed by: FAMILY MEDICINE

## 2024-07-19 PROCEDURE — 36415 COLL VENOUS BLD VENIPUNCTURE: CPT | Performed by: FAMILY MEDICINE

## 2024-07-19 PROCEDURE — G2211 COMPLEX E/M VISIT ADD ON: HCPCS | Performed by: FAMILY MEDICINE

## 2024-07-19 PROCEDURE — 99214 OFFICE O/P EST MOD 30 MIN: CPT | Performed by: FAMILY MEDICINE

## 2024-07-19 ASSESSMENT — PAIN SCALES - GENERAL: PAINLEVEL: MODERATE PAIN (4)

## 2024-07-22 ENCOUNTER — TELEPHONE (OUTPATIENT)
Dept: FAMILY MEDICINE | Facility: OTHER | Age: 49
End: 2024-07-22

## 2024-07-22 DIAGNOSIS — Z12.11 SCREENING FOR COLON CANCER: Primary | ICD-10-CM

## 2024-07-22 NOTE — TELEPHONE ENCOUNTER
Screening Questions for the Scheduling of Screening Colonoscopies     (If Colonoscopy is diagnostic, Provider should review the chart before scheduling.)    Are you younger than 50 or older than 80?  Yes, 49 year old    Do you take aspirin or fish oil?  Yes:  (if yes, tell patient to stop 1 week prior to Colonoscopy)    Do you take warfarin (Coumadin), clopidogrel (Plavix), apixaban (Eliquis), dabigatram (Pradaxa), rivaroxaban (Xarelto) or any blood thinner? No    Do you use oxygen at home?  No    Do you have kidney disease? No    Are you on dialysis? No    Have you had a stroke or heart attack in the last year? No    Have you had a stent in your heart or any blood vessel in the last year? No    Have you had a transplant of any organ?  No    Have you had a colonoscopy or upper endoscopy (EGD) before?  No         Date of scheduled Colonoscopy: 12/19/24    Provider: Dr. SUBHASH Elder     [facility]

## 2024-07-30 RX ORDER — BISACODYL 5 MG/1
10 TABLET, DELAYED RELEASE ORAL ONCE
Qty: 2 TABLET | Refills: 0 | Status: SHIPPED | OUTPATIENT
Start: 2024-07-30 | End: 2024-07-30

## 2024-07-30 NOTE — TELEPHONE ENCOUNTER
Patient scheduled for screening colonoscopy with Dr. Elder on 12/19/24.  Golytely bowel prep instructions and rx  sent to [facility] .   Patient does  need a pre-op. Voicemail message left for patient to schedule preop appointment 30 days prior to procedure.  Hold mounjaro 1 week, jardiance 72 hrs prior to procedure. Guide to your Colonoscopy or Upper GI Endoscopy and prep instructions sent to patient via US Mail.

## 2024-08-06 DIAGNOSIS — I10 BENIGN ESSENTIAL HYPERTENSION: ICD-10-CM

## 2024-08-06 RX ORDER — LISINOPRIL 40 MG/1
TABLET ORAL
Qty: 90 TABLET | Refills: 3 | Status: SHIPPED | OUTPATIENT
Start: 2024-08-06

## 2024-08-06 NOTE — TELEPHONE ENCOUNTER
LISINOPRIL 40 MG TABLET       Last Written Prescription Date:  08/14/2023  Last Fill Quantity: 90,   # refills: 3  Last Office Visit: 07/19/2024  Future Office visit:       Routing refill request to provider for review/approval because:    ACE Inhibitors (Including Combos) Protocol [id number]/06/2024 07:58 AM   Protocol Details Blood pressure under 140/90 in past 12 months- Clinicial or Patient Reported    Recent (12 mo) or future (90 days) visit within the authorizing provider's specialty        Kimberly Boecker, RN

## 2024-09-13 DIAGNOSIS — E11.9 TYPE 2 DIABETES MELLITUS WITHOUT COMPLICATION, WITHOUT LONG-TERM CURRENT USE OF INSULIN (H): ICD-10-CM

## 2024-09-13 RX ORDER — ROSUVASTATIN CALCIUM 10 MG/1
TABLET, COATED ORAL
Qty: 90 TABLET | Refills: 3 | Status: SHIPPED | OUTPATIENT
Start: 2024-09-13

## 2024-09-13 NOTE — TELEPHONE ENCOUNTER
ROSUVASTATIN CALCIUM 10MG TAB       Last Written Prescription Date:  09/14/2023  Last Fill Quantity: 90,   # refills: 3  Last Office Visit: 07/19/2024  Future Office visit:       Routing refill request to provider for review/approval because:  Antihyperlipidemic agents [id number]/13/2024 01:50 PM   Protocol Details Recent (12 mo) or future (90 days) visit within the authorizing provider's specialty

## 2024-09-23 DIAGNOSIS — E11.65 TYPE 2 DIABETES MELLITUS WITH HYPERGLYCEMIA, WITHOUT LONG-TERM CURRENT USE OF INSULIN (H): ICD-10-CM

## 2024-09-23 RX ORDER — ACYCLOVIR 400 MG/1
TABLET ORAL
Qty: 3 EACH | Refills: 0 | Status: SHIPPED | OUTPATIENT
Start: 2024-09-23

## 2024-10-22 DIAGNOSIS — E11.65 TYPE 2 DIABETES MELLITUS WITH HYPERGLYCEMIA, WITHOUT LONG-TERM CURRENT USE OF INSULIN (H): ICD-10-CM

## 2024-10-22 RX ORDER — ACYCLOVIR 400 MG/1
TABLET ORAL
Qty: 3 EACH | Refills: 0 | Status: SHIPPED | OUTPATIENT
Start: 2024-10-22

## 2024-11-05 ENCOUNTER — LAB (OUTPATIENT)
Dept: LAB | Facility: OTHER | Age: 49
End: 2024-11-05
Payer: COMMERCIAL

## 2024-11-05 DIAGNOSIS — E11.9 TYPE 2 DIABETES MELLITUS WITHOUT COMPLICATION, WITHOUT LONG-TERM CURRENT USE OF INSULIN (H): ICD-10-CM

## 2024-11-05 LAB
ANION GAP SERPL CALCULATED.3IONS-SCNC: 14 MMOL/L (ref 7–15)
BUN SERPL-MCNC: 16.9 MG/DL (ref 6–20)
CALCIUM SERPL-MCNC: 9.1 MG/DL (ref 8.8–10.4)
CHLORIDE SERPL-SCNC: 102 MMOL/L (ref 98–107)
CREAT SERPL-MCNC: 0.92 MG/DL (ref 0.67–1.17)
EGFRCR SERPLBLD CKD-EPI 2021: >90 ML/MIN/1.73M2
ERYTHROCYTE [DISTWIDTH] IN BLOOD BY AUTOMATED COUNT: 12.6 % (ref 10–15)
EST. AVERAGE GLUCOSE BLD GHB EST-MCNC: 123 MG/DL
GLUCOSE SERPL-MCNC: 135 MG/DL (ref 70–99)
HBA1C MFR BLD: 5.9 %
HCO3 SERPL-SCNC: 24 MMOL/L (ref 22–29)
HCT VFR BLD AUTO: 46.4 % (ref 40–53)
HGB BLD-MCNC: 15.9 G/DL (ref 13.3–17.7)
MCH RBC QN AUTO: 29.8 PG (ref 26.5–33)
MCHC RBC AUTO-ENTMCNC: 34.3 G/DL (ref 31.5–36.5)
MCV RBC AUTO: 87 FL (ref 78–100)
PLATELET # BLD AUTO: 268 10E3/UL (ref 150–450)
POTASSIUM SERPL-SCNC: 3.7 MMOL/L (ref 3.4–5.3)
RBC # BLD AUTO: 5.33 10E6/UL (ref 4.4–5.9)
SODIUM SERPL-SCNC: 140 MMOL/L (ref 135–145)
WBC # BLD AUTO: 9.5 10E3/UL (ref 4–11)

## 2024-11-05 PROCEDURE — 83036 HEMOGLOBIN GLYCOSYLATED A1C: CPT

## 2024-11-05 PROCEDURE — 85027 COMPLETE CBC AUTOMATED: CPT

## 2024-11-05 PROCEDURE — 36415 COLL VENOUS BLD VENIPUNCTURE: CPT

## 2024-11-05 PROCEDURE — 80048 BASIC METABOLIC PNL TOTAL CA: CPT

## 2024-11-11 DIAGNOSIS — I10 BENIGN ESSENTIAL HYPERTENSION: ICD-10-CM

## 2024-11-11 RX ORDER — METOPROLOL SUCCINATE 25 MG/1
25 TABLET, EXTENDED RELEASE ORAL DAILY
Qty: 90 TABLET | Refills: 3 | Status: SHIPPED | OUTPATIENT
Start: 2024-11-11

## 2024-11-11 NOTE — TELEPHONE ENCOUNTER
METOPROLOL SUCC ER 25 MG TAB       Last Written Prescription Date:  11/06/2023  Last Fill Quantity: 90,   # refills: 3  Last Office Visit: 07/19/2024  Future Office visit:    Next 5 appointments (look out 90 days)      Dec 06, 2024 8:30 AM  (Arrive by 8:15 AM)  Pre-Operative Physical with Na[facility] De Santiago MD  [facility] [address]  [place] [address]  [phone]     Jan 20, 2025 7:30 AM  (Arrive by 7:15 AM)  Adult Preventative Visit with Naomi De Santiago MD  [facility] ([facility] ) [address]  [place] [address]  [phone]             Routing refill request to provider for review/approval because:    Beta-Blockers Protocol [id number]/11/2024 07:28 AM   Protocol Details Most recent blood pressure under 140/90 in past 12 months          Kimberly Boecker, RN

## 2024-12-03 ENCOUNTER — TELEPHONE (OUTPATIENT)
Dept: FAMILY MEDICINE | Facility: OTHER | Age: 49
End: 2024-12-03

## 2024-12-03 NOTE — TELEPHONE ENCOUNTER
Pt called back he is NOT able to do the 12-6 as that is the one he cancelled because of family conflict

## 2024-12-03 NOTE — TELEPHONE ENCOUNTER
8:40 AM    Reason for Call: OVERBOOK    Patient needing Pre-Op / [facility] / DOS 12-19 / colonoscopy / Dr. NIKO Elder  (pt had to cancel 12-6 because a family conflict)     The patient is requesting an appointment for CANNOT do 12-6 or 12-10 with Dr. De Santiago prefers only her    Was an appointment offered for this call? No  If yes : Appointment type              Date    Preferred method for responding to this message: Telephone Call  What is your phone number ? [phone]     If we cannot reach you directly, may we leave a detailed response at the number you provided? Yes    Can this message wait until your PCP/provider returns, if unavailable today? Nelly Wolf

## 2024-12-03 NOTE — TELEPHONE ENCOUNTER
Left message for pt; seeing if he wants to come on 12/6 at 8am for pre op instead of on 12/17; asked pt to call back

## 2024-12-10 ENCOUNTER — ANESTHESIA EVENT (OUTPATIENT)
Dept: SURGERY | Facility: HOSPITAL | Age: 49
End: 2024-12-10
Payer: COMMERCIAL

## 2024-12-10 ASSESSMENT — LIFESTYLE VARIABLES: TOBACCO_USE: 1

## 2024-12-10 NOTE — ANESTHESIA PREPROCEDURE EVALUATION
Anesthesia Pre-Procedure Evaluation    Patient: Bucky Franco   MRN: [id number] : 1975        Procedure : Procedure(s):  COLONOSCOPY          Past Medical History:   Diagnosis Date     Diabetes (H)      GERD (gastroesophageal reflux disease) 2012     Gout, unspecified 2005     Hyperlipidemia 2012     Hypertension       Past Surgical History:   Procedure Laterality Date     ABDOMEN SURGERY      lap band     ADENOIDECTOMY       APPENDECTOMY       CIRCUMCISION       lap band       Onychomycoses       TONSILLECTOMY        Allergies   Allergen Reactions     Hctz [Hydrochlorothiazide] Other (See Comments)     Possible pancreatitis       Social History     Tobacco Use     Smoking status: Former     Current packs/day: 0.50     Average packs/day: 0.5 packs/day for 10.0 years (5.0 ttl pk-yrs)     Types: Dip, chew, snus or snuff, Cigarettes     Smokeless tobacco: Former     Types: Snuff     Quit date: 10/1/2015   Substance Use Topics     Alcohol use: Yes     Comment: Rarely      Wt Readings from Last 1 Encounters:   24 128.1 kg (282 lb 4.8 oz)        Anesthesia Evaluation   Pt has had prior anesthetic. Type: General.        ROS/MED HX  ENT/Pulmonary: Comment: Chronic sinusitis    (+) sleep apnea, mild, uses CPAP,              tobacco use, Past use,  5  Pack-Year Hx,                      Neurologic:       Cardiovascular: Comment: 24 (!) 150/90  24 (!) 158/80  24 138/74  24 (!) 140/82  24 138/85  24 128/72         (+) Dyslipidemia hypertension-range: metoprolol, lisinopril, diltiazem/ -   -  - -   Taking blood thinners  Instructions Given to patient: asa.                     Irregular Heartbeat/Palpitations,       Previous cardiac testing   Echo: Date: Results:    Stress Test:  Date: 10/2018 Results:  negative  ECG Reviewed:  Date: Results:    Cath:  Date: Results:      METS/Exercise Tolerance: >4 METS    Hematologic:       Musculoskeletal: Comment: gout     "  GI/Hepatic: Comment: Lap band    (+) GERD (on PPI), Asymptomatic on medication,      bowel prep,            Renal/Genitourinary:       Endo: Comment: mounjaro    (+)  type II DM, Last HgA1c: 5.9, date: 11/2024, Not using insulin,    Diabetic complications: retinopathy.      Obesity,       Psychiatric/Substance Use:       Infectious Disease:       Malignancy:       Other:            Physical Exam    Airway        Mallampati: II   TM distance: > 3 FB   Neck ROM: full   Mouth opening: > 3 cm    Respiratory Devices and Support         Dental       (+) Modest Abnormalities - crowns, retainers, 1 or 2 missing teeth    B=Bridge, C=Chipped, L=Loose, M=Missing    Cardiovascular          Rhythm and rate: regular and normal     Pulmonary           breath sounds clear to auscultation       OUTSIDE LABS:  CBC:   Lab Results   Component Value Date    WBC 9.5 11/05/2024    WBC 10.3 01/19/2024    HGB 15.9 11/05/2024    HGB 15.6 01/19/2024    HCT 46.4 11/05/2024    HCT 45.8 01/19/2024     11/05/2024     01/19/2024     BMP:   Lab Results   Component Value Date     11/05/2024     01/19/2024    POTASSIUM 3.7 11/05/2024    POTASSIUM 3.8 01/19/2024    CHLORIDE 102 11/05/2024    CHLORIDE 101 01/19/2024    CO2 24 11/05/2024    CO2 26 01/19/2024    BUN 16.9 11/05/2024    BUN 15.8 01/19/2024    CR 0.92 11/05/2024    CR 0.81 01/19/2024     (H) 11/05/2024     (H) 01/19/2024     COAGS: No results found for: \"PTT\", \"INR\", \"FIBR\"  POC: No results found for: \"BGM\", \"HCG\", \"HCGS\"  HEPATIC:   Lab Results   Component Value Date    ALBUMIN 4.4 01/19/2024    PROTTOTAL 7.6 01/19/2024    ALT 25 01/19/2024    AST 30 01/19/2024    GGT 46 04/19/2019    ALKPHOS 117 01/19/2024    BILITOTAL 0.5 01/19/2024     OTHER:   Lab Results   Component Value Date    A1C 5.9 (H) 11/05/2024    ANGEL 9.1 11/05/2024    LIPASE 240 04/17/2019    AMYLASE 52 04/17/2019    TSH 2.67 10/14/2020    T4 1.19 10/14/2020    CRP 88.0 (H) 04/19/2019 "       Anesthesia Plan    ASA Status:  3                     Consents            Postoperative Care            Comments:    Other Comments:  12/17 Hoyum not finished or signed yet== now completed.     MARILIA Reyna CNP    I have reviewed the pertinent notes and labs in the chart from the past 30 days. Any updates or changes from those notes are reflected in this note.               # Hypertension: Noted on problem list

## 2024-12-16 NOTE — PATIENT INSTRUCTIONS
How to Take Your Medication Before Surgery  Preoperative Medication Instructions   Antiplatelet or Anticoagulation Medication Instructions   - Bleeding risk is low for this procedure (e.g. dental, skin, cataract).    Additional Medication Instructions  Take all scheduled medications on the day of surgery EXCEPT for modifications listed below:   - Lisinopril - ACE/ARB: Continue without modification (e.g., MAC anesthesia, neurosurgery, spine surgery, heart failure, or labile hypertension with risk of hypertension).   - Metformin - Beta Blockers: Continue taking on the day of surgery.   - Diltiazem - Calcium Channel Blockers: May be continued on the day of surgery.   - metformin: DO NOT TAKE day of surgery.   - Glimepiride - sulfonylurea (e.g. glyburide, glipizide): DO NOT TAKE day of surgery   - Mounjaro: DO NOT TAKE 7 days before surgery        Patient Education   Preparing for Your Surgery  For Adults  Getting started  In most cases, a nurse will call to review your health history and instructions. They will give you an arrival time based on your scheduled surgery time. Please be ready to share:  Your doctor's clinic name and phone number  Your medical, surgical, and anesthesia history  A list of allergies and sensitivities  A list of medicines, including herbal treatments and over-the-counter drugs  Whether the patient has a legal guardian (ask how to send us the papers in advance)  Note: You may not receive a call if you were seen at our PAC (Preoperative Assessment Center).  Please tell us if you're pregnant--or if there's any chance you might be pregnant. Some surgeries may injure a fetus (unborn baby), so they require a pregnancy test. Surgeries that are safe for a fetus don't always need a test, and you can choose whether to have one.   Preparing for surgery  Within 10 to 30 days of surgery: Have a pre-op exam (sometimes called an H&P, or History and Physical). This can be done at a clinic or pre-operative  center.  If you're having a , you may not need this exam. Talk to your care team.  At your pre-op exam, talk to your care team about all medicines you take. (This includes CBD oil and any drugs, such as THC, marijuana, and other forms of cannabis.) If you need to stop any medicine before surgery, ask when to start taking it again.  This is for your safety. Many medicines and drugs can make you bleed too much during surgery. Some change how well surgery (anesthesia) drugs work.  Call your insurance company to let them know you're having surgery. (If you don't have insurance, call [phone].)  Call your clinic if there's any change in your health. This includes a scrape or scratch near the surgery site, or any signs of a cold (sore throat, runny nose, cough, rash, fever).  Eating and drinking guidelines  For your safety: Unless your surgeon tells you otherwise, follow the guidelines below.  Eat and drink as normal until 8 hours before you arrive for surgery. After that, no food or milk. You can spit out gum when you arrive.  Drink clear liquids until 2 hours before you arrive. These are liquids you can see through, like water, Gatorade, and Propel Water. They also include plain black coffee and tea (no cream or milk).  No alcohol for 24 hours before you arrive. The night before surgery, stop any drinks that contain THC.  If your care team tells you to take medicine on the morning of surgery, it's okay to take it with a sip of water. No other medicines or drugs are allowed (including CBD oil)--follow your care team's instructions.  If you have questions the day of surgery, call your hospital or surgery center.   Preventing infection  Shower or bathe the night before and the morning of surgery. Follow the instructions your clinic gave you. (If no instructions, use regular soap.)  Don't shave or clip hair near your surgery site. We'll remove the hair if needed.  Don't smoke or vape the morning of surgery. No  chewing tobacco for 6 hours before you arrive. A nicotine patch is okay. You may spit out nicotine gum when you arrive.  For some surgeries, the surgeon will tell you to fully quit smoking and nicotine.  We will make every effort to keep you safe from infection. We will:  Clean our hands often with soap and water (or an alcohol-based hand rub).  Clean the skin at your surgery site with a special soap that kills germs.  Give you a special gown to keep you warm. (Cold raises the risk of infection.)  Wear hair covers, masks, gowns, and gloves during surgery.  Give antibiotic medicine, if prescribed. Not all surgeries need this medicine.  What to bring on the day of surgery  Photo ID and insurance card  Copy of your health care directive, if you have one  Glasses and hearing aids (bring cases)  You can't wear contacts during surgery  Inhaler and eye drops, if you use them (tell us about these when you arrive)  CPAP machine or breathing device, if you use them  A few personal items, if spending the night  If you have . . .  A pacemaker, ICD (cardiac defibrillator), or other implant: Bring the ID card.  An implanted stimulator: Bring the remote control.  A legal guardian: Bring a copy of the certified (court-stamped) guardianship papers.  Please remove any jewelry, including body piercings. Leave jewelry and other valuables at home.  If you're going home the day of surgery  You must have a responsible adult drive you home. They should stay with you overnight as well.  If you don't have someone to stay with you, and you aren't safe to go home alone, we may keep you overnight. Insurance often won't pay for this.  After surgery  If it's hard to control your pain or you need more pain medicine, please call your surgeon's office.  Questions?   If you have any questions for your care team, list them here:    ____________________________________________________________________________________________________________________________________________________________________________________________________________________________________________________________  For informational purposes only. Not to replace the advice of your health care provider. Copyright   2003, 2019 [facility]. All rights reserved. Clinically reviewed by Sujit Brenner MD. [employer] [id number] - REV 08/24.

## 2024-12-16 NOTE — PROGRESS NOTES
Preoperative Evaluation  [facility] [place]  [address]  [place] [address]  Phone: [phone]  Primary Provider: Naomi De Santiago MD  Pre-op Performing Provider: Naomi De Santiago MD  Dec 17, 2024             12/16/2024   Surgical Information   What procedure is being done? Colonoscopy    Facility or Hospital where procedure/surgery will be performed: [facility]    Who is doing the procedure / surgery? Dr. Elder    Date of surgery / procedure: Dec. 19th 2024    Time of surgery / procedure: Unknown    Where do you plan to recover after surgery? at home with family        Patient-reported     Fax number for surgical facility: Note does not need to be faxed, will be available electronically in Epic.    Assessment & Plan     The proposed surgical procedure is considered LOW risk.    Preop general physical exam  No concerning lab or exam finding. Able to tolerate 4 METs w/o issues   - Basic metabolic panel; Future  - CBC with platelets; Future  - CBC with platelets  - Basic metabolic panel    Special screening for malignant neoplasms, colon  Reason for the procedure    Type 2 diabetes mellitus without complication, without long-term current use of insulin (H)  A1c (11/5/2024) 5.9  - on mounjaro  - on metformin   - c/w jardiance  - om glimepiride     Mixed hyperlipidemia  LDL (1/19/2024) 66  - c/w crestor 10    Benign essential hypertension  BP high today d/t stopping diltiazem   BP generally at goal   - c/w metoprolol, lisinopril, diltiazem    Leukocytosis, unspecified type  Diff normal  - Manual Differential       Implanted Device  Lap band      Risks and Recommendations  The patient has the following additional risks and recommendations for perioperative complications:  Diabetes:  - Patient is not on insulin therapy: regular NPO guidelines can be followed.     Preoperative Medication Instructions  Antiplatelet or Anticoagulation Medication Instructions   - Bleeding risk is low for this  procedure (e.g. dental, skin, cataract).    Additional Medication Instructions  Take all scheduled medications on the day of surgery EXCEPT for modifications listed below:   - Lisinopril - ACE/ARB: Continue without modification (e.g., MAC anesthesia, neurosurgery, spine surgery, heart failure, or labile hypertension with risk of hypertension).   - Metformin - Beta Blockers: Continue taking on the day of surgery.   - Diltiazem - Calcium Channel Blockers: May be continued on the day of surgery.   - metformin: DO NOT TAKE day of surgery.   - Glimepiride - sulfonylurea (e.g. glyburide, glipizide): DO NOT TAKE day of surgery   - Mounjaro: DO NOT TAKE 7 days before surgery     Recommendation  Approval given to proceed with proposed procedure, without further diagnostic evaluation.    Dilan Lalwer is a 49 year old, presenting for the following:  Pre-Op Exam          12/17/2024     7:26 AM   Additional Questions   Roomed by Emmanuelle Stiles   Accompanied by self     HPI related to upcoming procedure: Bucky is due for screening colonoscopy due to age.           12/16/2024   Pre-Op Questionnaire   Have you ever had a heart attack or stroke? No    Have you ever had surgery on your heart or blood vessels, such as a stent placement, a coronary artery bypass, or surgery on an artery in your head, neck, heart, or legs? No    Do you have chest pain with activity? No    Do you have a history of heart failure? No    Do you currently have a cold, bronchitis or symptoms of other infection? No    Do you have a cough, shortness of breath, or wheezing? No    Do you or anyone in your family have previous history of blood clots? No    Do you or does anyone in your family have a serious bleeding problem such as prolonged bleeding following surgeries or cuts? No    Have you ever had problems with anemia or been told to take iron pills? No    Have you had any abnormal blood loss such as black, tarry or bloody stools? No    Have you ever had  a blood transfusion? No    Are you willing to have a blood transfusion if it is medically needed before, during, or after your surgery? Yes    Have you or any of your relatives ever had problems with anesthesia? No    Do you have sleep apnea, excessive snoring or daytime drowsiness? No    Do you have any artifical heart valves or other implanted medical devices like a pacemaker, defibrillator, or continuous glucose monitor? (!) YES  - lap band   What type of device do you have? Lap band    Name of the clinic that manages your device Dont actively have one.    Do you have artificial joints? No    Are you allergic to latex? No        Patient-reported     Health Care Directive  Patient does not have a Health Care Directive: Discussed advance care planning with patient; however, patient declined at this time.    Preoperative Review of    reviewed - no record of controlled substances prescribed.      Status of Chronic Conditions:  See problem list for active medical problems.  Problems all longstanding and stable, except as noted/documented.  See ROS for pertinent symptoms related to these conditions.    DIABETES - Patient has a longstanding history of DiabetesType Type II . Patient is being treated with oral agents and Mounjaro and denies significant side effects. Control has been good. Complicating factors include but are not limited to: hypertension and hyperlipidemia.   A1c (11/5/2024) 5.9  Blood sugars are running well     HYPERLIPIDEMIA - Patient has a long history of significant Hyperlipidemia requiring medication for treatment with recent good control. Patient reports no problems or side effects with the medication.   LDL (1/19/2024) 66      HYPERTENSION - Patient has longstanding history of HTN , currently denies any symptoms referable to elevated blood pressure. Specifically denies chest pain, palpitations, dyspnea, orthopnea, PND or peripheral edema. Blood pressure readings have been in normal range.  Current medication regimen is as listed below. Patient denies any side effects of medication.     - has not taken diltiazem d/t instruction from surgery     BP Readings from Last 6 Encounters:   24 (!) 150/90   24 (!) 158/80   24 138/74   24 (!) 140/82   24 138/85   24 128/72         Patient Active Problem List    Diagnosis Date Noted    Mild nonproliferative diabetic retinopathy of both eyes without macular edema associated with type 2 diabetes mellitus (H) 2023     Priority: Medium    GEORGE (obstructive sleep apnea) - Mild 2023     Priority: Medium     [facility] - HOME SLEEP STUDY INTERPRETATION     Patient: Bucky Franco  MRN: [id number]  YOB: 1975  Study Date: 2023  Referring Provider: Naomi De Santiago  Ordering Provider: Trae Bueno MD, MD     Chain of custody patient verification was not enabled.       Indications for Home Study: Bucky Franco is a 47 year old male with a history of headaches, morbid obesity status post lap band procedure in  or , gout, DM II, HLD, HTN who presents with symptoms suggestive of obstructive sleep apnea.     Estimated body mass index is 39.22 kg/m  as calculated from the following:    Height as of 22: 1.829 m (6').    Weight as of 22: 131.2 kg (289 lb 3.2 oz).  STOP-BAN/8     Data: A full night home sleep study was performed recording the standard physiologic parameters including peripheral arterial tonometry (PAT), sound/snoring, body position,  movement, sound, and oxygen saturation by pulse oximetry. Pulse rate was estimated by oximetry recording. Sleep staging (wake, REM, light, and deep sleep) was derived from PAT signal.  This study was considered adequate based on > 4 hours of quality oximetry and respiratory recording. As specified by the AASM Manual for the Scoring of Sleep and Associated events, version 2.3, Rule VIII.D 1B, 4% oxygen desaturation scoring for  hypopneas is used as a standard of care on all home sleep apnea testing.     Total Recording Time: 7 hrs, 59 min  Total Sleep Time: 6 hrs, 30 min  % of Sleep Time REM: 26.1%     Respiratory:  Snoring: Snoring was present.  Respiratory events: The PAT respiratory disturbance index [pRDI] was 16.7 events per hour.  The PAT apnea/hypopnea index [pAHI] was 10.8 events per hour.  AMEYA was 8.3 events per hour.  During REM sleep the pAHI was 22.4.  Sleep Associated Hypoxemia: sustained hypoxemia was not present. Mean oxygen saturation was 93%.  Minimum was 83%.  Time with saturation less than 88% was 1 minutes.     Heart Rate: By pulse oximetry normal rate was noted.      Position: Percent of time spent: supine - 81.1%, prone - 0%, on right - 18.9%, on left - 0%.  pAHI was 13.1 per hour supine, - per hour prone, 0.8 per hour on right side, and - per hour on left side.      Assessment:   Mild obstructive sleep apnea.  Sleep associated hypoxemia was not present.     Recommendations:  Consider auto-CPAP at 5-15 cmH2O, oral appliance therapy, positional therapy or polysomnography with full night PAP titration.  Suggest optimizing sleep hygiene and avoiding sleep deprivation.  Weight management.     Diagnosis Code(s): Obstructive Sleep Apnea G47.33     Trae Bueno MD, MD, February 14, 2023   Diplomate, American Board of Family Medicine, Sleep Medicine      Benign essential hypertension 10/31/2018     Priority: Medium    Type 2 diabetes mellitus without complication, without long-term current use of insulin (H) 10/30/2018     Priority: Medium    Morbid obesity (H) 10/11/2018     Priority: Medium    Headache 03/31/2015     Priority: Medium     Problem list name updated by automated process. Provider to review      Sinus headache 03/13/2015     Priority: Medium    Somatic dysfunction of cervical region 03/13/2015     Priority: Medium    Mixed hyperlipidemia 05/18/2012     Priority: Medium    Esophageal reflux 05/18/2012      Priority: Medium      Past Medical History:   Diagnosis Date    Diabetes (H)     GERD (gastroesophageal reflux disease) 05/18/2012    Gout, unspecified 09/07/2005    Hyperlipidemia 05/18/2012    Hypertension      Past Surgical History:   Procedure Laterality Date    ABDOMEN SURGERY      lap band    ADENOIDECTOMY      APPENDECTOMY      CIRCUMCISION      lap band  2014    Onychomycoses      TONSILLECTOMY       Current Outpatient Medications   Medication Sig Dispense Refill    aspirin 81 MG tablet Take 1 tablet (81 mg) by mouth daily 90 tablet 3    beclomethasone (QNASL) 80 MCG/ACT nasal aerosol Spray 2 sprays into both nostrils daily 10.6 g 4    bisacodyl (DULCOLAX) 5 MG EC tablet       blood glucose (CONTOUR NEXT TEST) test strip Use to test blood sugar 2 times daily. 100 each 6    budesonide (PULMICORT) 0.5 MG/2ML neb solution Make 240 cc Negro med sinus irrigation Mix 2 ml vial of budesonide 0.5 mg Rinse 1-2 times daily 120 mL 1    Continuous Glucose Sensor (DEXCOM G7 SENSOR) MISC CHANGE 1 SENSOR EVERY 10 DAYS 3 each 3    diltiazem ER (TIAZAC) 360 MG 24 hr ER beaded capsule Take 1 capsule (360 mg) by mouth daily 90 capsule 3    empagliflozin (JARDIANCE) 25 MG TABS tablet TAKE 1 TABLET BY MOUTH DAILY 90 tablet 3    GAVILYTE-G 236 g suspension       glimepiride (AMARYL) 2 MG tablet TAKE 2 TABLETS BY MOUTH IN THE MORNING AND 2 TABLETS IN THE EVENING WITH MEALS 360 tablet 3    lisinopril (ZESTRIL) 40 MG tablet TAKE 1 TABLET BY MOUTH DAILY 90 tablet 3    metFORMIN (GLUCOPHAGE) 500 MG tablet TAKE 2 TABLETS BY MOUTH 2 TIMES DAILY WITH MEALS 120 tablet 5    metoprolol succinate ER (TOPROL XL) 25 MG 24 hr tablet TAKE ONE TABLET BY MOUTH DAILY 90 tablet 3    omeprazole (PRILOSEC) 20 MG DR capsule TAKE 1 CAPSULE BY MOUTH DAILY. TAKE 1/2 HOUR BEFORE YOUR FIRST MEAL OF THE DAY. 90 capsule 3    rosuvastatin (CRESTOR) 10 MG tablet TAKE 1 TABLET BY MOUTH DAILY 90 tablet 3    tirzepatide (MOUNJARO) 15 MG/0.5ML pen Inject 15 mg  "subcutaneously every 7 days 6 mL 1       Allergies   Allergen Reactions    Hctz [Hydrochlorothiazide] Other (See Comments)     Possible pancreatitis         Social History     Tobacco Use    Smoking status: Former     Current packs/day: 0.50     Average packs/day: 0.5 packs/day for 10.0 years (5.0 ttl pk-yrs)     Types: Dip, chew, snus or snuff, Cigarettes    Smokeless tobacco: Former     Types: Snuff     Quit date: 10/1/2015   Substance Use Topics    Alcohol use: Yes     Comment: Rarely       History   Drug Use No             Review of Systems  Constitutional, HEENT, cardiovascular, pulmonary, gi and gu systems are negative, except as otherwise noted.    Objective    BP (!) 150/90   Pulse 68   Temp 98.1  F (36.7  C) (Tympanic)   Resp 16   Ht 1.803 m (5' 11\")   Wt 127 kg (280 lb)   SpO2 97%   BMI 39.05 kg/m     Estimated body mass index is 39.05 kg/m  as calculated from the following:    Height as of this encounter: 1.803 m (5' 11\").    Weight as of this encounter: 127 kg (280 lb).  Physical Exam  Constitutional:       General: He is not in acute distress.     Appearance: He is well-developed.   HENT:      Head: Normocephalic and atraumatic.      Right Ear: Tympanic membrane normal.      Left Ear: Tympanic membrane normal.      Mouth/Throat:      Mouth: Mucous membranes are moist.      Pharynx: No oropharyngeal exudate.   Eyes:      Extraocular Movements: Extraocular movements intact.      Conjunctiva/sclera: Conjunctivae normal.   Neck:      Thyroid: No thyromegaly.   Cardiovascular:      Rate and Rhythm: Normal rate and regular rhythm.      Pulses: Normal pulses.      Heart sounds: No murmur heard.  Pulmonary:      Effort: Pulmonary effort is normal. No respiratory distress.      Breath sounds: No wheezing or rales.   Abdominal:      General: Bowel sounds are normal. There is no distension.      Palpations: Abdomen is soft.      Tenderness: There is no abdominal tenderness. There is no guarding. "   Musculoskeletal:         General: Normal range of motion.      Cervical back: Normal range of motion and neck supple.      Right lower leg: No edema.      Left lower leg: No edema.   Lymphadenopathy:      Cervical: No cervical adenopathy.   Skin:     General: Skin is warm and dry.   Neurological:      Mental Status: He is alert.   Psychiatric:         Mood and Affect: Mood normal.           Recent Labs   Lab Test 11/05/24  0746 07/19/24  0743 01/19/24  0726   HGB 15.9  --  15.6     --  288     --  139   POTASSIUM 3.7  --  3.8   CR 0.92  --  0.81   A1C 5.9* 6.6* 7.0*        Diagnostics  Recent Results (from the past 24 hours)   CBC with platelets    Collection Time: 12/17/24  7:48 AM   Result Value Ref Range    WBC Count 11.5 (H) 4.0 - 11.0 10e3/uL    RBC Count 5.25 4.40 - 5.90 10e6/uL    Hemoglobin 15.6 13.3 - 17.7 g/dL    Hematocrit 45.7 40.0 - 53.0 %    MCV 87 78 - 100 fL    MCH 29.7 26.5 - 33.0 pg    MCHC 34.1 31.5 - 36.5 g/dL    RDW 13.1 10.0 - 15.0 %    Platelet Count 299 150 - 450 10e3/uL   Basic metabolic panel    Collection Time: 12/17/24  7:48 AM   Result Value Ref Range    Sodium 141 135 - 145 mmol/L    Potassium 4.0 3.4 - 5.3 mmol/L    Chloride 102 98 - 107 mmol/L    Carbon Dioxide (CO2) 26 22 - 29 mmol/L    Anion Gap 13 7 - 15 mmol/L    Urea Nitrogen 15.9 6.0 - 20.0 mg/dL    Creatinine 0.89 0.67 - 1.17 mg/dL    GFR Estimate >90 >60 mL/min/1.73m2    Calcium 9.5 8.8 - 10.4 mg/dL    Glucose 130 (H) 70 - 99 mg/dL   Manual Differential    Collection Time: 12/17/24  7:48 AM   Result Value Ref Range    % Neutrophils 70 %    % Lymphocytes 18 %    % Monocytes 8 %    % Eosinophils 3 %    % Basophils 1 %    Absolute Neutrophils 8.0 1.6 - 8.3 10e3/uL    Absolute Lymphocytes 2.0 0.8 - 5.3 10e3/uL    Absolute Monocytes 0.9 0.0 - 1.3 10e3/uL    Absolute Eosinophils 0.4 0.0 - 0.7 10e3/uL    Absolute Basophils 0.1 0.0 - 0.2 10e3/uL    RBC Morphology Confirmed RBC Indices     Platelet Assessment  Automated  Count Confirmed. Platelet morphology is normal.     Automated Count Confirmed. Platelet morphology is normal.      No EKG required for low risk surgery (cataract, skin procedure, breast biopsy, etc).    Revised Cardiac Risk Index (RCRI)  The patient has the following serious cardiovascular risks for perioperative complications:   - No serious cardiac risks = 0 points     RCRI Interpretation: 0 points: Class I (very low risk - 0.4% complication rate)     The longitudinal plan of care for the diagnosis(es)/condition(s) as documented were addressed during this visit. Due to the added complexity in care, I will continue to support Bucky in the subsequent management and with ongoing continuity of care.    Signed Electronically by: Naomi De Santiago MD  A copy of this evaluation report is provided to the requesting physician.

## 2024-12-17 ENCOUNTER — OFFICE VISIT (OUTPATIENT)
Dept: FAMILY MEDICINE | Facility: OTHER | Age: 49
End: 2024-12-17
Attending: FAMILY MEDICINE
Payer: COMMERCIAL

## 2024-12-17 VITALS
DIASTOLIC BLOOD PRESSURE: 90 MMHG | OXYGEN SATURATION: 97 % | TEMPERATURE: 98.1 F | HEART RATE: 68 BPM | HEIGHT: 71 IN | RESPIRATION RATE: 16 BRPM | SYSTOLIC BLOOD PRESSURE: 150 MMHG | WEIGHT: 280 LBS | BODY MASS INDEX: 39.2 KG/M2

## 2024-12-17 DIAGNOSIS — D72.829 LEUKOCYTOSIS, UNSPECIFIED TYPE: ICD-10-CM

## 2024-12-17 DIAGNOSIS — E11.9 TYPE 2 DIABETES MELLITUS WITHOUT COMPLICATION, WITHOUT LONG-TERM CURRENT USE OF INSULIN (H): ICD-10-CM

## 2024-12-17 DIAGNOSIS — E78.2 MIXED HYPERLIPIDEMIA: ICD-10-CM

## 2024-12-17 DIAGNOSIS — I10 BENIGN ESSENTIAL HYPERTENSION: ICD-10-CM

## 2024-12-17 DIAGNOSIS — Z12.11 SPECIAL SCREENING FOR MALIGNANT NEOPLASMS, COLON: ICD-10-CM

## 2024-12-17 DIAGNOSIS — Z01.818 PREOP GENERAL PHYSICAL EXAM: Primary | ICD-10-CM

## 2024-12-17 LAB
ANION GAP SERPL CALCULATED.3IONS-SCNC: 13 MMOL/L (ref 7–15)
BASOPHILS # BLD MANUAL: 0.1 10E3/UL (ref 0–0.2)
BASOPHILS NFR BLD MANUAL: 1 %
BUN SERPL-MCNC: 15.9 MG/DL (ref 6–20)
CALCIUM SERPL-MCNC: 9.5 MG/DL (ref 8.8–10.4)
CHLORIDE SERPL-SCNC: 102 MMOL/L (ref 98–107)
CREAT SERPL-MCNC: 0.89 MG/DL (ref 0.67–1.17)
EGFRCR SERPLBLD CKD-EPI 2021: >90 ML/MIN/1.73M2
EOSINOPHIL # BLD MANUAL: 0.4 10E3/UL (ref 0–0.7)
EOSINOPHIL NFR BLD MANUAL: 3 %
ERYTHROCYTE [DISTWIDTH] IN BLOOD BY AUTOMATED COUNT: 13.1 % (ref 10–15)
GLUCOSE SERPL-MCNC: 130 MG/DL (ref 70–99)
HCO3 SERPL-SCNC: 26 MMOL/L (ref 22–29)
HCT VFR BLD AUTO: 45.7 % (ref 40–53)
HGB BLD-MCNC: 15.6 G/DL (ref 13.3–17.7)
LYMPHOCYTES # BLD MANUAL: 2 10E3/UL (ref 0.8–5.3)
LYMPHOCYTES NFR BLD MANUAL: 18 %
MCH RBC QN AUTO: 29.7 PG (ref 26.5–33)
MCHC RBC AUTO-ENTMCNC: 34.1 G/DL (ref 31.5–36.5)
MCV RBC AUTO: 87 FL (ref 78–100)
MONOCYTES # BLD MANUAL: 0.9 10E3/UL (ref 0–1.3)
MONOCYTES NFR BLD MANUAL: 8 %
NEUTROPHILS # BLD MANUAL: 8 10E3/UL (ref 1.6–8.3)
NEUTROPHILS NFR BLD MANUAL: 70 %
PLAT MORPH BLD: NORMAL
PLATELET # BLD AUTO: 299 10E3/UL (ref 150–450)
POTASSIUM SERPL-SCNC: 4 MMOL/L (ref 3.4–5.3)
RBC # BLD AUTO: 5.25 10E6/UL (ref 4.4–5.9)
RBC MORPH BLD: NORMAL
SODIUM SERPL-SCNC: 141 MMOL/L (ref 135–145)
WBC # BLD AUTO: 11.5 10E3/UL (ref 4–11)

## 2024-12-17 RX ORDER — POLYETHYLENE GLYCOL-3350 AND ELECTROLYTES 236; 6.74; 5.86; 2.97; 22.74 G/274.31G; G/274.31G; G/274.31G; G/274.31G; G/274.31G
POWDER, FOR SOLUTION ORAL
Status: ON HOLD | COMMUNITY
Start: 2024-12-11 | End: 2024-12-19

## 2024-12-17 RX ORDER — BISACODYL 5 MG
TABLET, DELAYED RELEASE (ENTERIC COATED) ORAL
Status: ON HOLD | COMMUNITY
Start: 2024-12-11 | End: 2024-12-19

## 2024-12-17 ASSESSMENT — PAIN SCALES - GENERAL: PAINLEVEL_OUTOF10: NO PAIN (0)

## 2024-12-19 ENCOUNTER — HOSPITAL ENCOUNTER (OUTPATIENT)
Facility: HOSPITAL | Age: 49
Discharge: HOME | End: 2024-12-19
Attending: SURGERY | Admitting: SURGERY
Payer: COMMERCIAL

## 2024-12-19 ENCOUNTER — ANESTHESIA (OUTPATIENT)
Dept: SURGERY | Facility: HOSPITAL | Age: 49
End: 2024-12-19
Payer: COMMERCIAL

## 2024-12-19 VITALS
DIASTOLIC BLOOD PRESSURE: 60 MMHG | OXYGEN SATURATION: 97 % | HEART RATE: 62 BPM | RESPIRATION RATE: 18 BRPM | HEIGHT: 71 IN | SYSTOLIC BLOOD PRESSURE: 114 MMHG | TEMPERATURE: 97.6 F | BODY MASS INDEX: 39.61 KG/M2

## 2024-12-19 LAB — GLUCOSE BLDC GLUCOMTR-MCNC: 150 MG/DL (ref 70–99)

## 2024-12-19 PROCEDURE — 370N000017 HC ANESTHESIA TECHNICAL FEE, PER MIN: Performed by: SURGERY

## 2024-12-19 PROCEDURE — G0121 COLON CA SCRN NOT HI RSK IND: HCPCS | Performed by: SURGERY

## 2024-12-19 PROCEDURE — 360N000075 HC SURGERY LEVEL 2, PER MIN: Performed by: SURGERY

## 2024-12-19 PROCEDURE — 82962 GLUCOSE BLOOD TEST: CPT

## 2024-12-19 PROCEDURE — 250N000011 HC RX IP 250 OP 636: Performed by: NURSE ANESTHETIST, CERTIFIED REGISTERED

## 2024-12-19 PROCEDURE — 272N000001 HC OR GENERAL SUPPLY STERILE: Performed by: SURGERY

## 2024-12-19 PROCEDURE — 999N000141 HC STATISTIC PRE-PROCEDURE NURSING ASSESSMENT: Performed by: SURGERY

## 2024-12-19 PROCEDURE — 250N000009 HC RX 250: Performed by: NURSE ANESTHETIST, CERTIFIED REGISTERED

## 2024-12-19 PROCEDURE — 710N000012 HC RECOVERY PHASE 2, PER MINUTE: Performed by: SURGERY

## 2024-12-19 RX ORDER — SODIUM CHLORIDE, SODIUM LACTATE, POTASSIUM CHLORIDE, CALCIUM CHLORIDE 600; 310; 30; 20 MG/100ML; MG/100ML; MG/100ML; MG/100ML
INJECTION, SOLUTION INTRAVENOUS CONTINUOUS
Status: DISCONTINUED | OUTPATIENT
Start: 2024-12-19 | End: 2024-12-19 | Stop reason: HOSPADM

## 2024-12-19 RX ORDER — ONDANSETRON 4 MG/1
4 TABLET, ORALLY DISINTEGRATING ORAL EVERY 30 MIN PRN
Status: DISCONTINUED | OUTPATIENT
Start: 2024-12-19 | End: 2024-12-19 | Stop reason: HOSPADM

## 2024-12-19 RX ORDER — DEXAMETHASONE SODIUM PHOSPHATE 10 MG/ML
4 INJECTION, SOLUTION INTRAMUSCULAR; INTRAVENOUS
Status: DISCONTINUED | OUTPATIENT
Start: 2024-12-19 | End: 2024-12-19 | Stop reason: HOSPADM

## 2024-12-19 RX ORDER — LIDOCAINE 40 MG/G
CREAM TOPICAL
Status: DISCONTINUED | OUTPATIENT
Start: 2024-12-19 | End: 2024-12-19 | Stop reason: HOSPADM

## 2024-12-19 RX ORDER — ONDANSETRON 2 MG/ML
4 INJECTION INTRAMUSCULAR; INTRAVENOUS EVERY 30 MIN PRN
Status: DISCONTINUED | OUTPATIENT
Start: 2024-12-19 | End: 2024-12-19 | Stop reason: HOSPADM

## 2024-12-19 RX ORDER — LIDOCAINE HYDROCHLORIDE 20 MG/ML
INJECTION, SOLUTION INFILTRATION; PERINEURAL PRN
Status: DISCONTINUED | OUTPATIENT
Start: 2024-12-19 | End: 2024-12-19

## 2024-12-19 RX ORDER — PROPOFOL 10 MG/ML
INJECTION, EMULSION INTRAVENOUS PRN
Status: DISCONTINUED | OUTPATIENT
Start: 2024-12-19 | End: 2024-12-19

## 2024-12-19 RX ORDER — NALOXONE HYDROCHLORIDE 0.4 MG/ML
0.1 INJECTION, SOLUTION INTRAMUSCULAR; INTRAVENOUS; SUBCUTANEOUS
Status: DISCONTINUED | OUTPATIENT
Start: 2024-12-19 | End: 2024-12-19 | Stop reason: HOSPADM

## 2024-12-19 RX ORDER — DEXMEDETOMIDINE HYDROCHLORIDE 4 UG/ML
INJECTION, SOLUTION INTRAVENOUS PRN
Status: DISCONTINUED | OUTPATIENT
Start: 2024-12-19 | End: 2024-12-19

## 2024-12-19 RX ORDER — OXYCODONE HYDROCHLORIDE 5 MG/1
5 TABLET ORAL
Status: DISCONTINUED | OUTPATIENT
Start: 2024-12-19 | End: 2024-12-19 | Stop reason: HOSPADM

## 2024-12-19 RX ADMIN — LIDOCAINE HYDROCHLORIDE 100 MG: 20 INJECTION, SOLUTION INFILTRATION; PERINEURAL at 10:34

## 2024-12-19 RX ADMIN — DEXMEDETOMIDINE HYDROCHLORIDE 10 MCG: 4 INJECTION, SOLUTION INTRAVENOUS at 10:34

## 2024-12-19 RX ADMIN — PROPOFOL 50 MG: 10 INJECTION, EMULSION INTRAVENOUS at 10:43

## 2024-12-19 RX ADMIN — DEXMEDETOMIDINE HYDROCHLORIDE 10 MCG: 4 INJECTION, SOLUTION INTRAVENOUS at 10:25

## 2024-12-19 RX ADMIN — PROPOFOL 100 MG: 10 INJECTION, EMULSION INTRAVENOUS at 10:34

## 2024-12-19 ASSESSMENT — ACTIVITIES OF DAILY LIVING (ADL)
ADLS_ACUITY_SCORE: 41

## 2024-12-19 NOTE — OP NOTE
REPORT OF OPERATION  DATE OF PROCEDURE: 12/19/2024    PATIENT: Bucky Franco    SURGERY PERFORMED: Colonoscopy    PREOPERATIVE DIAGNOSIS: Screening colonoscopy    POSTOPERATIVE DIAGNOSIS:    Same   Normal colonoscopy   Diverticulosis was not identified.   Hemorrhoids  were  identified.    SURGEON: Tone Elder MD    ASSISTANTS: None    ANESTHESIA: Monitored Anesthesia Care    COMPLICATIONS: None apparent    TRANSFUSIONS: None    TISSUE TO PATHOLOGY: None    FINDINGS: Normal colonoscopy.  Diverticulosis was not identified.  Hemorrhoids  were  identified.    INDICATIONS: This is a 49 year old male in need of a colonoscopy for Screening colonoscopy.  The patient will be taken to the endoscopy suite for that procedure.    DESCRIPTIONS OF PROCEDURE IN DETAIL: After consent was obtained the patient was taken to the endoscopy suite and placed in the left lateral decubitus position.  The patient was identified and the correct patient was confirmed.  Monitored Anesthesia Care was given.  A time out was performed verifying the correct patient and the correct procedure.  The entire operative team was in agreement.  All necessary equipment and supplies were in the room.    Rectal exam was performed and no lesions of the anal canal were noted.  The colonoscope was inserted into the anus and passed without difficulty to the cecum.  The cecum was identified by the ileocecal valve, the coalescence of the tinea and the appendiceal orifice.  Upon withdrawal all walls of the colon were visualized.  There were no polyps, masses or evidence of colitis seen.  Diverticulosis was not seen.  Upon reaching the rectum the scope was retroflexed and internal hemorrhoids  were  seen.  The scope was straightened back out and removed from the patient.  The patient was then taken to the recovery room in stable condition tolerating the procedure well.      Prep: fair    Withdrawal time was 5 minutes.    It is recommended that the patient have  another colonoscopy in 10 years.

## 2024-12-19 NOTE — ANESTHESIA CARE TRANSFER NOTE
Patient: Bucky Franco    Procedure: Procedure(s):  COLONOSCOPY       Diagnosis: Screening for colon cancer [Z12.11]  Diagnosis Additional Information: No value filed.    Anesthesia Type:   No value filed.     Note:    Oropharynx: oropharynx clear of all foreign objects and spontaneously breathing  Level of Consciousness: drowsy  Oxygen Supplementation: room air    Independent Airway: airway patency satisfactory and stable  Dentition: dentition unchanged  Vital Signs Stable: post-procedure vital signs reviewed and stable  Report to RN Given: handoff report given  Patient transferred to: Phase II    Handoff Report: Identifed the Patient, Identified the Reponsible Provider, Reviewed the pertinent medical history, Discussed the surgical course, Reviewed Intra-OP anesthesia mangement and issues during anesthesia, Set expectations for post-procedure period and Allowed opportunity for questions and acknowledgement of understanding      Vitals:  Vitals Value Taken Time   BP     Temp     Pulse     Resp     SpO2 95 % 12/19/24 1049   Vitals shown include unfiled device data.    Electronically Signed By: MARILIA SMILEY CRNA  December 19, 2024  10:49 AM

## 2024-12-19 NOTE — ANESTHESIA POSTPROCEDURE EVALUATION
Patient: Bucky Franco    Procedure: Procedure(s):  COLONOSCOPY       Anesthesia Type:  No value filed.    Note:  Disposition: Outpatient   Postop Pain Control: Uneventful            Sign Out: Well controlled pain   PONV: No   Neuro/Psych: Uneventful            Sign Out: Acceptable/Baseline neuro status   Airway/Respiratory: Uneventful            Sign Out: Acceptable/Baseline resp. status   CV/Hemodynamics: Uneventful            Sign Out: Acceptable CV status; No obvious hypovolemia; No obvious fluid overload   Other NRE: NONE   DID A NON-ROUTINE EVENT OCCUR? No           Last vitals:  Vitals Value Taken Time   /60 12/19/24 1110   Temp 97.6  F (36.4  C) 12/19/24 1110   Pulse 62 12/19/24 1110   Resp 18 12/19/24 1110   SpO2 97 % 12/19/24 1118   Vitals shown include unfiled device data.    Electronically Signed By: MARILIA SMILEY CRNA  December 19, 2024  11:42 AM

## 2024-12-19 NOTE — DISCHARGE INSTRUCTIONS
After Anesthesia (Sleep Medicine)  What should I do after anesthesia?  You should rest and relax for the next 24 hours. Avoid risky or difficult (strenuous) activity. A responsible adult should stay with you overnight.  Don't drive or use any heavy equipment for 24 hours. Even if you feel normal, your reactions may be affected by the sleep medicine given to you.  Don't drink alcohol or make any important decisions for 24 hours.  Slowly get back to your regular diet, as you feel able.  How should I expect to feel?  It's normal to feel dizzy, light-headed, or faint for up to a full day after anesthesia or while taking pain medicine. If this happens:   Sit down for a few minutes before standing.  Have someone help you when you get up to walk or use the bathroom.  If you have nausea (feel sick to your stomach) or vomit (throw up):   Drink clear liquids (such as apple juice, ginger ale, broth, or 7UP) until you feel better.  If you feel sick to your stomach, or you keep vomiting for 24 hours, please call the doctor.  What else should I know?  You might have a dry mouth, sore throat, muscle aches, or trouble sleeping. These should go away after 24 hours.  Please contact your doctor if you have any other symptoms that concern you, such as fever, pain, bleeding, fluid drainage, swelling, or headache, or if it's been over 8 to 10 hours and you still aren't able to pee (urinate).  If you have a history of sleep apnea, it's very important to use your CPAP machine for the next 24 hours when you nap or sleep.   For informational purposes only. Not to replace the advice of your health care provider. Copyright   2023 [employer]. All rights reserved. Clinically reviewed by Sujit Brenner MD. Meez [id number] - REV 09/23.  Colonoscopy: What to Expect at Home  Your Recovery  After a colonoscopy, you'll stay at the clinic until you wake up. Then you can go home. But you'll need to arrange for a ride. Your doctor will  tell you when you can eat and do your other usual activities.  Your doctor will talk to you about when you'll need your next colonoscopy. Your doctor can help you decide how often you need to be checked. This will depend on the results of your test and your risk for colorectal cancer.  After the test, you may be bloated or have gas pains. You may need to pass gas. If a biopsy was done or a polyp was removed, you may have streaks of blood in your stool (feces) for a few days. Problems such as heavy rectal bleeding may not occur until several weeks after the test. This isn't common. But it can happen after polyps are removed.  This care sheet gives you a general idea about how long it will take for you to recover. But each person recovers at a different pace. Follow the steps below to get better as quickly as possible.  How can you care for yourself at home?  Activity    Rest when you feel tired.     You can do your normal activities when it feels okay to do so.   Diet    Follow your doctor's directions for eating.     Unless your doctor has told you not to, drink plenty of fluids. This helps to replace the fluids that were lost during the colon prep.     Do not drink alcohol.   Medicines    Your doctor will tell you if and when you can restart your medicines. You will also be given instructions about taking any new medicines.     If you stopped taking aspirin or some other blood thinner, your doctor will tell you when to start taking it again.     If polyps were removed or a biopsy was done during the test, your doctor may tell you not to take aspirin or other anti-inflammatory medicines for a few days. These include ibuprofen (Advil, Motrin) and naproxen (Aleve).   Other instructions    For your safety, do not drive or operate machinery until the medicine wears off and you can think clearly. Your doctor may tell you not to drive or operate machinery until the day after your test.     Do not sign legal documents or  "make major decisions until the medicine wears off and you can think clearly. The anesthesia can make it hard for you to fully understand what you are agreeing to.   Follow-up care is a key part of your treatment and safety. Be sure to make and go to all appointments, and call your doctor if you are having problems. It's also a good idea to know your test results and keep a list of the medicines you take.  When should you call for help?   Call 911 anytime you think you may need emergency care. For example, call if:    You passed out (lost consciousness).     You pass maroon or bloody stools.     You have trouble breathing.   Call your doctor now or seek immediate medical care if:    You have pain that does not get better after you take pain medicine.     You are sick to your stomach or cannot drink fluids.     You have new or worse belly pain.     You have blood in your stools.     You have a fever.     You cannot pass stools or gas.   Watch closely for changes in your health, and be sure to contact your doctor if you have any problems.  Where can you learn more?  Go to https://www."[employer]net/patiented  Enter E264 in the search box to learn more about \"Colonoscopy: What to Expect at Home.\"  Current as of: October 25, 2023  Content Version: 14.3    2024 [employer]..   Care instructions adapted under license by your healthcare professional. If you have questions about a medical condition or this instruction, always ask your healthcare professional. [employer]. disclaims any warranty or liability for your use of this information.      "

## 2024-12-23 ENCOUNTER — TRANSFERRED RECORDS (OUTPATIENT)
Dept: HEALTH INFORMATION MANAGEMENT | Facility: CLINIC | Age: 49
End: 2024-12-23

## 2024-12-30 DIAGNOSIS — E11.9 TYPE 2 DIABETES MELLITUS WITHOUT COMPLICATION, WITHOUT LONG-TERM CURRENT USE OF INSULIN (H): ICD-10-CM

## 2025-01-14 ENCOUNTER — TELEPHONE (OUTPATIENT)
Dept: FAMILY MEDICINE | Facility: OTHER | Age: 50
End: 2025-01-14

## 2025-01-14 ENCOUNTER — MYC MEDICAL ADVICE (OUTPATIENT)
Dept: FAMILY MEDICINE | Facility: OTHER | Age: 50
End: 2025-01-14

## 2025-01-14 DIAGNOSIS — E11.65 TYPE 2 DIABETES MELLITUS WITH HYPERGLYCEMIA, WITHOUT LONG-TERM CURRENT USE OF INSULIN (H): ICD-10-CM

## 2025-01-14 NOTE — TELEPHONE ENCOUNTER
[facility] Continuous Glucose Sensor (DEXCOM G7 SENSOR) MIS. Submitted on CMM. Waiting for a response.

## 2025-01-14 NOTE — TELEPHONE ENCOUNTER
Received a DENIAL from [facility] for Continuous Glucose Sensor (DEXCOM G7 SENSOR) MISC.

## 2025-01-15 ENCOUNTER — TELEPHONE (OUTPATIENT)
Dept: FAMILY MEDICINE | Facility: OTHER | Age: 50
End: 2025-01-15

## 2025-01-15 DIAGNOSIS — E11.9 TYPE 2 DIABETES MELLITUS WITHOUT COMPLICATION, WITHOUT LONG-TERM CURRENT USE OF INSULIN (H): Primary | ICD-10-CM

## 2025-01-15 RX ORDER — HYDROCHLOROTHIAZIDE 12.5 MG/1
CAPSULE ORAL
Qty: 2 EACH | Refills: 11 | Status: SHIPPED | OUTPATIENT
Start: 2025-01-15

## 2025-01-15 RX ORDER — ACYCLOVIR 400 MG/1
TABLET ORAL
Qty: 3 EACH | Refills: 3 | Status: SHIPPED | OUTPATIENT
Start: 2025-01-15

## 2025-01-15 NOTE — TELEPHONE ENCOUNTER
[facility] Continuous Glucose Sensor (FREESTYLE ERIC 3 PLUS SENSOR) [facility]. Submitted on CMM. Waiting for ar response.

## 2025-01-15 NOTE — TELEPHONE ENCOUNTER
I received the PA request for the QFPay Yonis 3 but CoverMyMeds is saying that Dexcom is preferred - yet they denied the PA for Dexcom G7.     Is the patient using multiple daily insulin injections, insulin pump or bassal insulin?

## 2025-01-16 NOTE — TELEPHONE ENCOUNTER
Received a DENIAL from [facility] for Continuous Glucose Sensor (FREESTYLE ERIC 3 PLUS SENSOR) MISC. It doesn't look like they will cover these without the patient using multiple daily insulin injections, an insulin pump or basal insulin.

## 2025-01-20 DIAGNOSIS — E11.9 TYPE 2 DIABETES MELLITUS WITHOUT COMPLICATION, WITHOUT LONG-TERM CURRENT USE OF INSULIN (H): ICD-10-CM

## 2025-01-20 DIAGNOSIS — I10 BENIGN ESSENTIAL HYPERTENSION: ICD-10-CM

## 2025-01-20 NOTE — TELEPHONE ENCOUNTER
Mounjaro      Last Written Prescription Date:  7/19/24  Last Fill Quantity: 6mL,   # refills: 1  Last Office Visit: 12/17/24  Future Office visit:    Next 5 appointments (look out 90 days)      Jan 31, 2025 8:00 AM  (Arrive by 7:45 AM)  Adult Preventative Visit with Naomi De Santiago MD  [facility] ([facility] [place] ) [address] [place] [address]  [phone]             Routing refill request to provider for review/approval because:      Tiadylt      Last Written Prescription Date:  1/25/24  Last Fill Quantity: 90,   # refills: 3  Last Office Visit: 12/17/24  Future Office visit:    Next 5 appointments (look out 90 days)      Jan 31, 2025 8:00 AM  (Arrive by 7:45 AM)  Adult Preventative Visit with Naomi De Santiago MD  [facility] [place] ([facility] [place] ) [address]  [place] [address]  [phone]             Routing refill request to provider for review/approval because:

## 2025-01-21 RX ORDER — DILTIAZEM HYDROCHLORIDE 360 MG/1
360 CAPSULE, EXTENDED RELEASE ORAL DAILY
Qty: 90 CAPSULE | Refills: 1 | Status: SHIPPED | OUTPATIENT
Start: 2025-01-21

## 2025-01-21 RX ORDER — TIRZEPATIDE 15 MG/.5ML
INJECTION, SOLUTION SUBCUTANEOUS
Qty: 2 ML | Refills: 4 | Status: SHIPPED | OUTPATIENT
Start: 2025-01-21

## 2025-01-31 ENCOUNTER — LAB (OUTPATIENT)
Dept: LAB | Facility: OTHER | Age: 50
End: 2025-01-31
Attending: FAMILY MEDICINE
Payer: COMMERCIAL

## 2025-01-31 DIAGNOSIS — E78.2 MIXED HYPERLIPIDEMIA: ICD-10-CM

## 2025-01-31 DIAGNOSIS — E11.9 TYPE 2 DIABETES MELLITUS WITHOUT COMPLICATION, WITHOUT LONG-TERM CURRENT USE OF INSULIN (H): ICD-10-CM

## 2025-01-31 LAB
ALBUMIN SERPL BCG-MCNC: 4.5 G/DL (ref 3.5–5.2)
ALP SERPL-CCNC: 115 U/L (ref 40–150)
ALT SERPL W P-5'-P-CCNC: 26 U/L (ref 0–70)
ANION GAP SERPL CALCULATED.3IONS-SCNC: 12 MMOL/L (ref 7–15)
AST SERPL W P-5'-P-CCNC: 22 U/L (ref 0–45)
BILIRUB SERPL-MCNC: 0.5 MG/DL
BUN SERPL-MCNC: 21.5 MG/DL (ref 6–20)
CALCIUM SERPL-MCNC: 9.3 MG/DL (ref 8.8–10.4)
CHLORIDE SERPL-SCNC: 100 MMOL/L (ref 98–107)
CHOLEST SERPL-MCNC: 128 MG/DL
CREAT SERPL-MCNC: 0.95 MG/DL (ref 0.67–1.17)
CREAT UR-MCNC: 104.8 MG/DL
EGFRCR SERPLBLD CKD-EPI 2021: >90 ML/MIN/1.73M2
FASTING STATUS PATIENT QL REPORTED: YES
FASTING STATUS PATIENT QL REPORTED: YES
GLUCOSE SERPL-MCNC: 131 MG/DL (ref 70–99)
HCO3 SERPL-SCNC: 25 MMOL/L (ref 22–29)
HDLC SERPL-MCNC: 38 MG/DL
LDLC SERPL CALC-MCNC: 57 MG/DL
MICROALBUMIN UR-MCNC: 20.4 MG/L
MICROALBUMIN/CREAT UR: 19.47 MG/G CR (ref 0–17)
NONHDLC SERPL-MCNC: 90 MG/DL
POTASSIUM SERPL-SCNC: 3.8 MMOL/L (ref 3.4–5.3)
PROT SERPL-MCNC: 7.8 G/DL (ref 6.4–8.3)
SODIUM SERPL-SCNC: 137 MMOL/L (ref 135–145)
TRIGL SERPL-MCNC: 166 MG/DL

## 2025-01-31 PROCEDURE — 82043 UR ALBUMIN QUANTITATIVE: CPT

## 2025-01-31 PROCEDURE — 80061 LIPID PANEL: CPT

## 2025-01-31 PROCEDURE — 36415 COLL VENOUS BLD VENIPUNCTURE: CPT

## 2025-01-31 PROCEDURE — 80053 COMPREHEN METABOLIC PANEL: CPT

## 2025-01-31 PROCEDURE — 82570 ASSAY OF URINE CREATININE: CPT

## 2025-04-23 NOTE — PROGRESS NOTES
Assessment & Plan     Type 2 diabetes mellitus without complication, without long-term current use of insulin (H)  A1c today of 5.8, steady from previous with the reduced dosage of glimepiride. Will do a trial of stopping glimepiride to reduce risks of low blood sugars. Bucky was not approved for CGM  - Hemoglobin A1c; Future  - FOOT EXAM  NO CHARGE [14362.114]  - c/w mounjaro 15mg qwk  - c/w metformin 1000/1000  - c/w jardiance  - discontinue glimepiride  - follows with [facility] with next visit 5/7/2025    Benign essential hypertension  BP at goal. No change in HTN meds  - c/w diltiazem, metoprolol, lisinopril    The longitudinal plan of care for the diagnosis(es)/condition(s) as documented were addressed during this visit. Due to the added complexity in care, I will continue to support Bucky in the subsequent management and with ongoing continuity of care.          Follow-up  Return in about 3 months (around 7/28/2025) for Lab Work, CDM.    Subjective   Bucky is a 49 year old, presenting for the following health issues:  Diabetes and Hypertension        4/28/2025     7:48 AM   Additional Questions   Roomed by Emmanuelle Stiles   Accompanied by self     History of Present Illness       Diabetes:   He presents for follow up of diabetes.  He is checking home blood glucose three times daily.   He checks blood glucose before and after meals.  Blood glucose is sometimes over 200 and sometimes under 70. He is aware of hypoglycemia symptoms including shakiness and weakness.    He has no concerns regarding his diabetes at this time.  He is having numbness in feet and burning in feet.            He eats 0-1 servings of fruits and vegetables daily.He consumes 0 sweetened beverage(s) daily.He exercises with enough effort to increase his heart rate 10 to 19 minutes per day.  He exercises with enough effort to increase his heart rate 3 or less days per week.   He is taking medications regularly.        Diabetes  Follow-up    How often are you checking your blood sugar? Three times daily  Blood sugar testing frequency justification:  Uncontrolled diabetes  What time of day are you checking your blood sugars (select all that apply)?  Before and after meals  Have you had any blood sugars above 200?  Yes   Have you had any blood sugars below 70?  Yes ~ 70  What symptoms do you notice when your blood sugar is low?  Shaky and Weak  What concerns do you have today about your diabetes? None   Do you have any of these symptoms? (Select all that apply)  Numbness in feet and Burning in feet    - reduced glimepiride to reduce risk of hypoglycemia - no changes   - denied  CGM, so poking fingers to check   - no symptoms of lows  - on mounjaro with continued weight loss    Diabetic Foot Screen:  Any complaints of increased pain or numbness ? No  Is there a foot ulcer now or a history of foot ulcer? No  Does the foot have an abnormal shape? No  Are the nails thick, too long or ingrown? No  Are there any redness or open areas? No         Sensation Testing done at all points on the diagram with monofilament     Right Foot: Sensation absent at the following points, 1, 2, and 3  Left Foot: Sensation Absent at the following points , 1, 2, and 3     Risk Category: 1- Loss of protective sensation with normal appearing foot (no weakness, deformity, callous, pre-ulcer or h/o ulceration)  Performed by Naomi De Santiago MD     BP Readings from Last 2 Encounters:   04/28/25 137/70   01/31/25 (!) 140/74     Hemoglobin A1C (%)   Date Value   11/05/2024 5.9 (H)   07/19/2024 6.6 (H)   05/16/2022 7.5 (A)   11/15/2021 6.9 (A)     LDL Cholesterol Calculated (mg/dL)   Date Value   01/31/2025 57   01/19/2024 66   07/13/2020 58   01/10/2019 50     Wt Readings from Last 4 Encounters:   04/28/25 123.6 kg (272 lb 6.4 oz)   01/31/25 126 kg (277 lb 11.2 oz)   12/17/24 127 kg (280 lb)   07/19/24 128.1 kg (282 lb 4.8 oz)         Hypertension Follow-up    Do you  "check your blood pressure regularly outside of the clinic? No   Are you following a low salt diet? Yes  Are your blood pressures ever more than 140 on the top number (systolic) OR more   than 90 on the bottom number (diastolic), for example 140/90? Yes    BP Readings from Last 2 Encounters:   04/28/25 137/70   01/31/25 (!) 140/74       Wt Readings from Last 4 Encounters:   04/28/25 123.6 kg (272 lb 6.4 oz)   01/31/25 126 kg (277 lb 11.2 oz)   12/17/24 127 kg (280 lb)   07/19/24 128.1 kg (282 lb 4.8 oz)     # right hip  - thinks it was muscle, woke up with pain       Review of Systems  Constitutional, HEENT, cardiovascular, pulmonary, gi and gu systems are negative, except as otherwise noted.      Objective    /70   Pulse 69   Temp 98.2  F (36.8  C) (Tympanic)   Resp 17   Ht 1.778 m (5' 10\")   Wt 123.6 kg (272 lb 6.4 oz)   SpO2 95%   BMI 39.09 kg/m    Body mass index is 39.09 kg/m .  Physical Exam  Constitutional:       General: He is not in acute distress.     Appearance: He is not ill-appearing.   Cardiovascular:      Rate and Rhythm: Normal rate and regular rhythm.      Pulses:           Dorsalis pedis pulses are 2+ on the right side and 2+ on the left side.      Heart sounds: No murmur heard.  Pulmonary:      Effort: Pulmonary effort is normal. No respiratory distress.      Breath sounds: No wheezing or rales.   Feet:      Right foot:      Protective Sensation: 10 sites tested.  5 sites sensed.      Skin integrity: Skin integrity normal.      Toenail Condition: Fungal disease present.     Left foot:      Protective Sensation: 10 sites tested.  5 sites sensed.      Skin integrity: Skin integrity normal.      Toenail Condition: Left toenails are normal.   Neurological:      Mental Status: He is alert.   Psychiatric:         Mood and Affect: Mood normal.            No results found for this or any previous visit (from the past 24 hours).        Signed Electronically by: Naomi De Santiago MD    "

## 2025-04-28 ENCOUNTER — LAB (OUTPATIENT)
Dept: LAB | Facility: OTHER | Age: 50
End: 2025-04-28
Payer: COMMERCIAL

## 2025-04-28 ENCOUNTER — OFFICE VISIT (OUTPATIENT)
Dept: FAMILY MEDICINE | Facility: OTHER | Age: 50
End: 2025-04-28
Attending: FAMILY MEDICINE
Payer: COMMERCIAL

## 2025-04-28 VITALS
DIASTOLIC BLOOD PRESSURE: 70 MMHG | HEIGHT: 70 IN | RESPIRATION RATE: 17 BRPM | OXYGEN SATURATION: 95 % | WEIGHT: 272.4 LBS | HEART RATE: 69 BPM | SYSTOLIC BLOOD PRESSURE: 137 MMHG | BODY MASS INDEX: 39 KG/M2 | TEMPERATURE: 98.2 F

## 2025-04-28 DIAGNOSIS — E11.9 TYPE 2 DIABETES MELLITUS WITHOUT COMPLICATION, WITHOUT LONG-TERM CURRENT USE OF INSULIN (H): Primary | ICD-10-CM

## 2025-04-28 DIAGNOSIS — I10 BENIGN ESSENTIAL HYPERTENSION: ICD-10-CM

## 2025-04-28 DIAGNOSIS — E11.9 TYPE 2 DIABETES MELLITUS WITHOUT COMPLICATION, WITHOUT LONG-TERM CURRENT USE OF INSULIN (H): ICD-10-CM

## 2025-04-28 LAB
EST. AVERAGE GLUCOSE BLD GHB EST-MCNC: 120 MG/DL
HBA1C MFR BLD: 5.8 %

## 2025-04-28 PROCEDURE — 36415 COLL VENOUS BLD VENIPUNCTURE: CPT

## 2025-04-28 PROCEDURE — 3078F DIAST BP <80 MM HG: CPT | Performed by: FAMILY MEDICINE

## 2025-04-28 PROCEDURE — 3075F SYST BP GE 130 - 139MM HG: CPT | Performed by: FAMILY MEDICINE

## 2025-04-28 PROCEDURE — 1126F AMNT PAIN NOTED NONE PRSNT: CPT | Performed by: FAMILY MEDICINE

## 2025-04-28 PROCEDURE — 99207 PR FOOT EXAM NO CHARGE: CPT | Performed by: FAMILY MEDICINE

## 2025-04-28 PROCEDURE — 83036 HEMOGLOBIN GLYCOSYLATED A1C: CPT

## 2025-04-28 PROCEDURE — G2211 COMPLEX E/M VISIT ADD ON: HCPCS | Performed by: FAMILY MEDICINE

## 2025-04-28 PROCEDURE — 99214 OFFICE O/P EST MOD 30 MIN: CPT | Performed by: FAMILY MEDICINE

## 2025-04-28 ASSESSMENT — PAIN SCALES - GENERAL: PAINLEVEL_OUTOF10: NO PAIN (0)

## 2025-05-01 ENCOUNTER — TELEPHONE (OUTPATIENT)
Dept: FAMILY MEDICINE | Facility: OTHER | Age: 50
End: 2025-05-01

## 2025-05-01 DIAGNOSIS — E11.9 TYPE 2 DIABETES MELLITUS WITHOUT COMPLICATION, WITHOUT LONG-TERM CURRENT USE OF INSULIN (H): Primary | ICD-10-CM

## 2025-05-07 ENCOUNTER — HOSPITAL ENCOUNTER (OUTPATIENT)
Dept: EDUCATION SERVICES | Facility: HOSPITAL | Age: 50
Discharge: HOME | End: 2025-05-07
Attending: DIETITIAN, REGISTERED
Payer: COMMERCIAL

## 2025-05-07 VITALS
SYSTOLIC BLOOD PRESSURE: 135 MMHG | BODY MASS INDEX: 38.48 KG/M2 | HEART RATE: 71 BPM | WEIGHT: 268.8 LBS | OXYGEN SATURATION: 97 % | RESPIRATION RATE: 16 BRPM | HEIGHT: 70 IN | DIASTOLIC BLOOD PRESSURE: 86 MMHG

## 2025-05-07 DIAGNOSIS — E11.65 TYPE 2 DIABETES MELLITUS WITH HYPERGLYCEMIA, WITHOUT LONG-TERM CURRENT USE OF INSULIN (H): ICD-10-CM

## 2025-05-07 DIAGNOSIS — E11.9 TYPE 2 DIABETES MELLITUS WITHOUT COMPLICATION, WITHOUT LONG-TERM CURRENT USE OF INSULIN (H): Primary | ICD-10-CM

## 2025-05-07 PROCEDURE — G0108 DIAB MANAGE TRN  PER INDIV: HCPCS | Performed by: DIETITIAN, REGISTERED

## 2025-05-07 RX ORDER — LANCETS
1 EACH MISCELLANEOUS DAILY
Qty: 100 EACH | Refills: 3 | Status: SHIPPED | OUTPATIENT
Start: 2025-05-07

## 2025-05-07 ASSESSMENT — PAIN SCALES - GENERAL: PAINLEVEL_OUTOF10: NO PAIN (0)

## 2025-05-07 NOTE — LETTER
5/7/2025      Bucky Franco  [address]  [place] [address]        Diabetes Self-Management Education & Support    Presents for: Individual review (Annual)    Type of Service: In Person Visit    Assessment  Recent A1c was in target at 5.8% and stable from previous A1c.  Weight is down 24# since last visit to [facility] January 2024 with use of Mounjaro.  Pt is pleased with weight loss and diabetes control.  He recently stopped his Glimepiride after visit with provider and notes no increase in glucose levels since doing so.  He has been using his glucose meter again as CGM is no longer covered by his insurance.  Discussion notes he seems to making healthier food choices vs in the past.  Still no routine exercise - encouraged to promote further weight loss.  Eye exam and foot exam are up to date.      Patient's most recent   Lab Results   Component Value Date    A1C 5.8 04/28/2025    A1C 7.5 05/16/2022     is meeting goal of <7.0    Diabetes knowledge and skills assessment:   Patient is knowledgeable in diabetes management concepts related to: Healthy Eating, Being Active, Monitoring, Taking Medication, Problem Solving, Reducing Risks, and Healthy Coping    Based on learning assessment above, most appropriate setting for further diabetes education would be: Individual setting.    Care Plan and Education Provided:  Will continue to educate on diabetes management concepts as indicated.      Patient verbalized understanding of diabetes self-management education concepts discussed, opportunities for ongoing education and support, and recommendations provided today.    Plan  Pt will continue to work on healthy food choices, being more active, weight loss.    Order for test strips for Contour meter sent to [employer] (pt has been paying cash).   He will call or My Chart if he desires to obtain Freestyle Yonis 3+ sensors and pay cash.  Cost would be 75.00/month with voucher.     See Care Plan for co-developed, patient-state  "behavior change goals.    Education Materials Provided:  No new materials today.    Follow up:  Annually or sooner if needed.    Subjective/Objective  Bucky is an 49 year old, presenting for the following diabetes education related to: Individual review (Annual)  Accompanied by: Self  Diabetes education in the past 24mo: Yes  Focus of Visit: Assistance w/ making life changes  Diabetes type: Type 2  Date of diagnosis: 2018  Disease course: Improving  How confident are you filling out medical forms by yourself:: Extremely  Diabetes management related comments/concerns: None  Transportation concerns: No  Difficulty affording diabetes medication?: No  Difficulty affording diabetes testing supplies?: Sometimes (Insurance will not longer pay for Yonis)  Other concerns: None  Cultural Influences/Ethnic Background:  Not  or     Diabetes Symptoms & Complications:  Diabetes Related Symptoms: Fatigue (Pt feels fatigue is r/t work.)  Weight trend: Decreasing (Down 24# since January 2024.)  Symptom course: Improving  Disease course: Improving  Complications assessed today?: Yes  CVA: No  Heart disease: No  Nephropathy: No  Peripheral Vascular Disease: No  Retinopathy: No    Patient Problem List and Family Medical History reviewed for relevant medical history, current medical status, and diabetes risk factors.    Vitals:  /86   Pulse 71   Resp 16   Ht 1.772 m (5' 9.75\")   Wt 121.9 kg (268 lb 12.8 oz)   SpO2 97%   BMI 38.85 kg/m    Estimated body mass index is 38.85 kg/m  as calculated from the following:    Height as of this encounter: 1.772 m (5' 9.75\").    Weight as of this encounter: 121.9 kg (268 lb 12.8 oz).   Last 3 BP:   BP Readings from Last 3 Encounters:   05/07/25 135/86   04/28/25 137/70   01/31/25 (!) 140/74       History   Smoking Status     Former     Types: Dip, chew, snus or snuff, Cigarettes   Smokeless Tobacco     Former     Types: Snuff     Quit date: 10/1/2015       Labs:  Lab Results "   Component Value Date    A1C 5.8 04/28/2025    A1C 7.5 05/16/2022     Lab Results   Component Value Date     01/31/2025     12/19/2024     08/15/2022     10/14/2020     Lab Results   Component Value Date    LDL 57 01/31/2025    LDL 58 07/13/2020     HDL Cholesterol   Date Value Ref Range Status   07/13/2020 35 (L) >39 mg/dL Final     Direct Measure HDL   Date Value Ref Range Status   01/31/2025 38 (L) >=40 mg/dL Final     GFR Estimate   Date Value Ref Range Status   01/31/2025 >90 >60 mL/min/1.73m2 Final     Comment:     eGFR calculated using 2021 CKD-EPI equation.   10/14/2020 >90 >60 mL/min/[1.73_m2] Final     Comment:     Non  GFR Calc  Starting 12/18/2018, serum creatinine based estimated GFR (eGFR) will be   calculated using the Chronic Kidney Disease Epidemiology Collaboration   (CKD-EPI) equation.       GFR Estimate If Black   Date Value Ref Range Status   10/14/2020 >90 >60 mL/min/[1.73_m2] Final     Comment:      GFR Calc  Starting 12/18/2018, serum creatinine based estimated GFR (eGFR) will be   calculated using the Chronic Kidney Disease Epidemiology Collaboration   (CKD-EPI) equation.       Lab Results   Component Value Date    CR 0.95 01/31/2025    CR 0.74 10/14/2020     Lab Results   Component Value Date    MICROL 20.4 01/31/2025    UMALCR 19.47 (H) 01/31/2025    UCRR 104.8 01/31/2025 5/7/2025   Healthy Eating   Healthy Eating Assessed Today Yes   Cultural/Jew diet restrictions? No   Do you have any food allergies or intolerances? No   Meal planning/habits Other   Please elaborate: Pt tries to follow intermittent fasting, high protein/low carbohdyrate diet.   Who cooks/prepares meals for you? Self   Who purchases food in  your home? Self   How many times a week on average do you eat food made away from home (restaurant/take-out)? 0   Meals include Lunch;Dinner;Morning Snack   Lunch meat, small amount of rice   Dinner ham and  cheese sandwich with bob - maybe an egg   Snacks cheese   Other Eats only from noon to 8 pm.   Beverages Water;Milk;Diet soda;Energy drinks;Alcohol       Sugar free energy drinks   Has patient met with a dietitian in the past? Yes         5/7/2025   Being Active   Being Active Assessed Today Yes   Exercise: Currently not exercising       Likes to golf during the summer.   Barrier to exercise Time;Other         5/7/2025   Monitoring   Monitoring Assessed Today Yes   Did patient bring glucose meter to appointment?  No   Blood Glucose Meter ContourNext   Times checking blood sugar at home (number) 3   Times checking blood sugar at home (per) Day   Blood glucose trend No change   Pt states fasting glucose levels 130-140's.  Highest glucose levels around 225 after eating.   Denies any hypoglycemia.     Diabetes Medication(s)       Biguanides       metFORMIN (GLUCOPHAGE) 500 MG tablet TAKE 2 TABLETS BY MOUTH 2 TIMES DAILY WITH MEALS       Sodium-Glucose Co-Transporter 2 (SGLT2) Inhibitors       empagliflozin (JARDIANCE) 25 MG TABS tablet TAKE 1 TABLET BY MOUTH DAILY       Incretin Mimetic Agents       Tirzepatide (MOUNJARO) 15 MG/0.5ML SOAJ INJECT 15MG SUBCUTANEOUSLY EVERY 7 DAYS              5/7/2025   Taking Medications   Taking Medication Assessed Today Yes   Current Treatments Diet;Non-insulin Injectables;Oral Medication (taken by mouth)       Metformin 1000 mg bid, Jardiance 25 mg am, Mounjaro 15 mg weekly.  Stopped Glimepiride last week per provider (4 mg daily- at one time he was on 8 mg daily).   Problems taking diabetes medications regularly? No   Diabetes medication side effects? No         5/7/2025   Problem Solving   Problem Solving Assessed Today Yes   Is the patient at risk for hypoglycemia? No   Hypoglycemia Frequency Never           5/7/2025   Reducing Risks   Reducing Risks Assessed Today Yes   Diabetes Risks Age over 45 years;Sedentary Lifestyle   CAD Risks Diabetes Mellitus;Male sex;Obesity;Sedentary  lifestyle   Has dilated eye exam at least once a year? Yes   Sees dentist every 6 months? Yes   Feet checked by healthcare provider in the last year? Yes         5/7/2025   Healthy Coping: Diabetes Distress Assessment   Healthy Coping Assessed Today Yes   I feel burned out by all of the attention and effort that diabetes demands of me. 2 - A Little Problem   It bothers me that diabetes seems to control my life. 2 - A Little Problem   I am frustrated that even when I do what I am supposed to for my diabetes, it doesn't seem to make a difference. 2 - A Little Problem   No matter how hard I try with my diabetes, it feels like it will never be good enough. 1 - Not a Problem   I am so tired of having to worry about diabetes all the time. 2 - A Little Problem   When it comes to my diabetes, I often feel like a failure. 2 - A Little Problem   It depresses me when I realize that my diabetes will likely never go away. 1 - Not a Problem   Living with diabetes is overwhelming for me. 1 - Not a Problem   T2 DDAS Total Score (0 - 1.9 Little or no DD, 2.0 - 2.9 Moderate DD,  3.0+ High DD) 1.6   Informal Support system: None     Time Spent: 40 minutes  Encounter Type: Individual    Any diabetes medication dose changes were made via the CDCES Standing Orders under the patient's referring provider.       Sincerely,        Harmony Bird RD    Electronically signed

## 2025-05-07 NOTE — PATIENT INSTRUCTIONS
-Keep working on your healthy food choices.  You are doing great!  -Try to add some more routine exercise.   -Good times to test using your glucose meter are - fasting, before a meal or 2 hours after a meal.  I will order you some Contour test strips from [employer] to see if your insurance will pay.   -Target glucose levels are fasting and before meals , 2 hours after a meal < 180.   -If you decide you want to pay cash for Yonis sensors let me know.  Cost will be 75.00 per month with coupon code.  -Recent A1c was 5.8% - great job!  Goal is < 7.0%.   -Weight today was 268# - down 24# since starting Mounjaro Jan 2024.  Great job!  -Follow up annually or sooner if needed.   -Call with any concerns- MICHAEL Aviles, [facility] [phone].

## 2025-05-07 NOTE — PROGRESS NOTES
Diabetes Self-Management Education & Support    Presents for: Individual review (Annual)    Type of Service: In Person Visit    Assessment  Recent A1c was in target at 5.8% and stable from previous A1c.  Weight is down 24# since last visit to [facility] January 2024 with use of Mounjaro.  Pt is pleased with weight loss and diabetes control.  He recently stopped his Glimepiride after visit with provider and notes no increase in glucose levels since doing so.  He has been using his glucose meter again as CGM is no longer covered by his insurance.  Discussion notes he seems to making healthier food choices vs in the past.  Still no routine exercise - encouraged to promote further weight loss.  Eye exam and foot exam are up to date.      Patient's most recent   Lab Results   Component Value Date    A1C 5.8 04/28/2025    A1C 7.5 05/16/2022     is meeting goal of <7.0    Diabetes knowledge and skills assessment:   Patient is knowledgeable in diabetes management concepts related to: Healthy Eating, Being Active, Monitoring, Taking Medication, Problem Solving, Reducing Risks, and Healthy Coping    Based on learning assessment above, most appropriate setting for further diabetes education would be: Individual setting.    Care Plan and Education Provided:  Will continue to educate on diabetes management concepts as indicated.      Patient verbalized understanding of diabetes self-management education concepts discussed, opportunities for ongoing education and support, and recommendations provided today.    Plan  Pt will continue to work on healthy food choices, being more active, weight loss.    Order for test strips for Contour meter sent to [employer] (pt has been paying cash).   He will call or My Chart if he desires to obtain Freestyle Yonis 3+ sensors and pay cash.  Cost would be 75.00/month with voucher.     See Care Plan for co-developed, patient-state behavior change goals.    Education Materials Provided:  No new materials  "today.    Follow up:  Annually or sooner if needed.    Subjective/Objective  Bucky is an 49 year old, presenting for the following diabetes education related to: Individual review (Annual)  Accompanied by: Self  Diabetes education in the past 24mo: Yes  Focus of Visit: Assistance w/ making life changes  Diabetes type: Type 2  Date of diagnosis: 2018  Disease course: Improving  How confident are you filling out medical forms by yourself:: Extremely  Diabetes management related comments/concerns: None  Transportation concerns: No  Difficulty affording diabetes medication?: No  Difficulty affording diabetes testing supplies?: Sometimes (Insurance will not longer pay for Yonis)  Other concerns: None  Cultural Influences/Ethnic Background:  Not  or     Diabetes Symptoms & Complications:  Diabetes Related Symptoms: Fatigue (Pt feels fatigue is r/t work.)  Weight trend: Decreasing (Down 24# since January 2024.)  Symptom course: Improving  Disease course: Improving  Complications assessed today?: Yes  CVA: No  Heart disease: No  Nephropathy: No  Peripheral Vascular Disease: No  Retinopathy: No    Patient Problem List and Family Medical History reviewed for relevant medical history, current medical status, and diabetes risk factors.    Vitals:  /86   Pulse 71   Resp 16   Ht 1.772 m (5' 9.75\")   Wt 121.9 kg (268 lb 12.8 oz)   SpO2 97%   BMI 38.85 kg/m    Estimated body mass index is 38.85 kg/m  as calculated from the following:    Height as of this encounter: 1.772 m (5' 9.75\").    Weight as of this encounter: 121.9 kg (268 lb 12.8 oz).   Last 3 BP:   BP Readings from Last 3 Encounters:   05/07/25 135/86   04/28/25 137/70   01/31/25 (!) 140/74       History   Smoking Status    Former    Types: Dip, chew, snus or snuff, Cigarettes   Smokeless Tobacco    Former    Types: Snuff    Quit date: 10/1/2015       Labs:  Lab Results   Component Value Date    A1C 5.8 04/28/2025    A1C 7.5 05/16/2022     Lab " Results   Component Value Date     01/31/2025     12/19/2024     08/15/2022     10/14/2020     Lab Results   Component Value Date    LDL 57 01/31/2025    LDL 58 07/13/2020     HDL Cholesterol   Date Value Ref Range Status   07/13/2020 35 (L) >39 mg/dL Final     Direct Measure HDL   Date Value Ref Range Status   01/31/2025 38 (L) >=40 mg/dL Final     GFR Estimate   Date Value Ref Range Status   01/31/2025 >90 >60 mL/min/1.73m2 Final     Comment:     eGFR calculated using 2021 CKD-EPI equation.   10/14/2020 >90 >60 mL/min/[1.73_m2] Final     Comment:     Non  GFR Calc  Starting 12/18/2018, serum creatinine based estimated GFR (eGFR) will be   calculated using the Chronic Kidney Disease Epidemiology Collaboration   (CKD-EPI) equation.       GFR Estimate If Black   Date Value Ref Range Status   10/14/2020 >90 >60 mL/min/[1.73_m2] Final     Comment:      GFR Calc  Starting 12/18/2018, serum creatinine based estimated GFR (eGFR) will be   calculated using the Chronic Kidney Disease Epidemiology Collaboration   (CKD-EPI) equation.       Lab Results   Component Value Date    CR 0.95 01/31/2025    CR 0.74 10/14/2020     Lab Results   Component Value Date    MICROL 20.4 01/31/2025    UMALCR 19.47 (H) 01/31/2025    UCRR 104.8 01/31/2025 5/7/2025   Healthy Eating   Healthy Eating Assessed Today Yes   Cultural/Quaker diet restrictions? No   Do you have any food allergies or intolerances? No   Meal planning/habits Other   Please elaborate: Pt tries to follow intermittent fasting, high protein/low carbohdyrate diet.   Who cooks/prepares meals for you? Self   Who purchases food in  your home? Self   How many times a week on average do you eat food made away from home (restaurant/take-out)? 0   Meals include Lunch;Dinner;Morning Snack   Lunch meat, small amount of rice   Dinner ham and cheese sandwich with bob - maybe an egg   Snacks cheese   Other Eats only  from noon to 8 pm.   Beverages Water;Milk;Diet soda;Energy drinks;Alcohol       Sugar free energy drinks   Has patient met with a dietitian in the past? Yes         5/7/2025   Being Active   Being Active Assessed Today Yes   Exercise: Currently not exercising       Likes to golf during the summer.   Barrier to exercise Time;Other         5/7/2025   Monitoring   Monitoring Assessed Today Yes   Did patient bring glucose meter to appointment?  No   Blood Glucose Meter ContourNext   Times checking blood sugar at home (number) 3   Times checking blood sugar at home (per) Day   Blood glucose trend No change   Pt states fasting glucose levels 130-140's.  Highest glucose levels around 225 after eating.   Denies any hypoglycemia.     Diabetes Medication(s)       Biguanides       metFORMIN (GLUCOPHAGE) 500 MG tablet TAKE 2 TABLETS BY MOUTH 2 TIMES DAILY WITH MEALS       Sodium-Glucose Co-Transporter 2 (SGLT2) Inhibitors       empagliflozin (JARDIANCE) 25 MG TABS tablet TAKE 1 TABLET BY MOUTH DAILY       Incretin Mimetic Agents       Tirzepatide (MOUNJARO) 15 MG/0.5ML SOAJ INJECT 15MG SUBCUTANEOUSLY EVERY 7 DAYS              5/7/2025   Taking Medications   Taking Medication Assessed Today Yes   Current Treatments Diet;Non-insulin Injectables;Oral Medication (taken by mouth)       Metformin 1000 mg bid, Jardiance 25 mg am, Mounjaro 15 mg weekly.  Stopped Glimepiride last week per provider (4 mg daily- at one time he was on 8 mg daily).   Problems taking diabetes medications regularly? No   Diabetes medication side effects? No         5/7/2025   Problem Solving   Problem Solving Assessed Today Yes   Is the patient at risk for hypoglycemia? No   Hypoglycemia Frequency Never           5/7/2025   Reducing Risks   Reducing Risks Assessed Today Yes   Diabetes Risks Age over 45 years;Sedentary Lifestyle   CAD Risks Diabetes Mellitus;Male sex;Obesity;Sedentary lifestyle   Has dilated eye exam at least once a year? Yes   Sees dentist  every 6 months? Yes   Feet checked by healthcare provider in the last year? Yes         5/7/2025   Healthy Coping: Diabetes Distress Assessment   Healthy Coping Assessed Today Yes   I feel burned out by all of the attention and effort that diabetes demands of me. 2 - A Little Problem   It bothers me that diabetes seems to control my life. 2 - A Little Problem   I am frustrated that even when I do what I am supposed to for my diabetes, it doesn't seem to make a difference. 2 - A Little Problem   No matter how hard I try with my diabetes, it feels like it will never be good enough. 1 - Not a Problem   I am so tired of having to worry about diabetes all the time. 2 - A Little Problem   When it comes to my diabetes, I often feel like a failure. 2 - A Little Problem   It depresses me when I realize that my diabetes will likely never go away. 1 - Not a Problem   Living with diabetes is overwhelming for me. 1 - Not a Problem   T2 DDAS Total Score (0 - 1.9 Little or no DD, 2.0 - 2.9 Moderate DD,  3.0+ High DD) 1.6   Informal Support system: None     Time Spent: 40 minutes  Encounter Type: Individual    Any diabetes medication dose changes were made via the [facility] Standing Orders under the patient's referring provider.     MICHAEL Aviles, [facility] Center[facility]ELA[facility]

## 2025-06-02 DIAGNOSIS — E11.9 TYPE 2 DIABETES MELLITUS WITHOUT COMPLICATION, WITHOUT LONG-TERM CURRENT USE OF INSULIN (H): ICD-10-CM

## 2025-06-02 RX ORDER — EMPAGLIFLOZIN 25 MG/1
25 TABLET, FILM COATED ORAL DAILY
Qty: 90 TABLET | Refills: 0 | Status: SHIPPED | OUTPATIENT
Start: 2025-06-02

## 2025-06-12 DIAGNOSIS — E11.9 TYPE 2 DIABETES MELLITUS WITHOUT COMPLICATION, WITHOUT LONG-TERM CURRENT USE OF INSULIN (H): ICD-10-CM

## 2025-06-12 RX ORDER — TIRZEPATIDE 15 MG/.5ML
INJECTION, SOLUTION SUBCUTANEOUS
Qty: 2 ML | Refills: 0 | Status: SHIPPED | OUTPATIENT
Start: 2025-06-12

## 2025-07-14 DIAGNOSIS — E11.9 TYPE 2 DIABETES MELLITUS WITHOUT COMPLICATION, WITHOUT LONG-TERM CURRENT USE OF INSULIN (H): ICD-10-CM

## 2025-07-14 DIAGNOSIS — K21.9 GASTROESOPHAGEAL REFLUX DISEASE, UNSPECIFIED WHETHER ESOPHAGITIS PRESENT: ICD-10-CM

## 2025-07-14 RX ORDER — TIRZEPATIDE 15 MG/.5ML
INJECTION, SOLUTION SUBCUTANEOUS
Qty: 2 ML | Refills: 5 | Status: SHIPPED | OUTPATIENT
Start: 2025-07-14

## 2025-07-14 RX ORDER — OMEPRAZOLE 20 MG/1
CAPSULE, DELAYED RELEASE ORAL
Qty: 90 CAPSULE | Refills: 3 | Status: SHIPPED | OUTPATIENT
Start: 2025-07-14

## 2025-07-24 NOTE — PROGRESS NOTES
Assessment & Plan     Type 2 diabetes mellitus without complication, without long-term current use of insulin (H)  A1c increase with 7.8 from 5.8 with stopping glimepiride, will restart with 2mg bid   - Hemoglobin A1c; Future  - glimepiride (AMARYL) 2 MG tablet; Take 1 tablet (2 mg) by mouth 2 times daily (before meals).  - c/w mounjaro 15mg qwk  - c/w metformin 1000/1000  - c/w jardiance 25mg     The longitudinal plan of care for the diagnosis(es)/condition(s) as documented were addressed during this visit. Due to the added complexity in care, I will continue to support Bucky in the subsequent management and with ongoing continuity of care.      Follow-up  Return in about 3 months (around 10/28/2025) for CDM, Lab Work.    Dilan Lawler is a 50 year old, presenting for the following health issues:  Diabetes        7/28/2025     8:02 AM   Additional Questions   Roomed by Ellie Virgen     History of Present Illness       Diabetes:   He presents for follow up of diabetes.  He is checking home blood glucose two times daily.   He checks blood glucose before and after meals.  Blood glucose is sometimes over 200 and sometimes under 70. He is aware of hypoglycemia symptoms including shakiness and weakness.   He is concerned about blood sugar frequently over 200.   He is having numbness in feet, burning in feet, excessive thirst and weight loss.            He eats 0-1 servings of fruits and vegetables daily.He consumes 6 sweetened beverage(s) daily.He exercises with enough effort to increase his heart rate 10 to 19 minutes per day.  He exercises with enough effort to increase his heart rate 3 or less days per week.   He is taking medications regularly.          Diabetes Follow-up    How often are you checking your blood sugar? Two times daily  Blood sugar testing frequency justification:  Uncontrolled diabetes  What time of day are you checking your blood sugars (select all that apply)?  Before and after meals  Have  "you had any blood sugars above 200?  Yes   Have you had any blood sugars below 70?  No   What symptoms do you notice when your blood sugar is low?  Shaky and Weak  What concerns do you have today about your diabetes? None and Blood sugar is often over 200   Do you have any of these symptoms? (Select all that apply)  Numbness in feet and Burning in feet excessive thirst, weight loss    - discontinued glimepiride at last visit  - over 200s regularly  - would like to be at A1c of 6.1 or lower   - no lows with glimepiride   - would like to restart glimepiride every day   - does not have CGM d/t insurance issues,     BP Readings from Last 2 Encounters:   07/28/25 126/78   05/07/25 135/86     Hemoglobin A1C (%)   Date Value   04/28/2025 5.8 (H)   11/05/2024 5.9 (H)   05/16/2022 7.5 (A)   11/15/2021 6.9 (A)     LDL Cholesterol Calculated (mg/dL)   Date Value   01/31/2025 57   01/19/2024 66   07/13/2020 58   01/10/2019 50           Review of Systems  Constitutional, HEENT, cardiovascular, pulmonary, gi and gu systems are negative, except as otherwise noted.      Objective    /78 (BP Location: Right arm, Patient Position: Sitting, Cuff Size: Adult Regular)   Pulse 65   Temp 98.1  F (36.7  C) (Tympanic)   Resp 16   Ht 1.772 m (5' 9.75\")   Wt 118.4 kg (261 lb)   SpO2 97%   BMI 37.72 kg/m    Body mass index is 37.72 kg/m .  Physical Exam  Constitutional:       General: He is not in acute distress.     Appearance: He is not ill-appearing.   Cardiovascular:      Rate and Rhythm: Normal rate and regular rhythm.      Heart sounds: No murmur heard.  Pulmonary:      Effort: Pulmonary effort is normal. No respiratory distress.      Breath sounds: No wheezing or rales.   Neurological:      Mental Status: He is alert.   Psychiatric:         Mood and Affect: Mood normal.            Recent Results (from the past 24 hours)   Hemoglobin A1c   Result Value Ref Range    Estimated Average Glucose 177 (H) <117 mg/dL    Hemoglobin " A1C 7.8 (H) <5.7 %           Signed Electronically by: Naomi De Santiago MD

## 2025-07-28 ENCOUNTER — TELEPHONE (OUTPATIENT)
Dept: EDUCATION SERVICES | Facility: HOSPITAL | Age: 50
End: 2025-07-28

## 2025-07-28 ENCOUNTER — OFFICE VISIT (OUTPATIENT)
Dept: FAMILY MEDICINE | Facility: OTHER | Age: 50
End: 2025-07-28
Attending: FAMILY MEDICINE
Payer: COMMERCIAL

## 2025-07-28 ENCOUNTER — LAB (OUTPATIENT)
Dept: LAB | Facility: OTHER | Age: 50
End: 2025-07-28
Attending: FAMILY MEDICINE
Payer: COMMERCIAL

## 2025-07-28 VITALS
WEIGHT: 261 LBS | RESPIRATION RATE: 16 BRPM | HEART RATE: 65 BPM | HEIGHT: 70 IN | TEMPERATURE: 98.1 F | SYSTOLIC BLOOD PRESSURE: 126 MMHG | OXYGEN SATURATION: 97 % | DIASTOLIC BLOOD PRESSURE: 78 MMHG | BODY MASS INDEX: 37.37 KG/M2

## 2025-07-28 DIAGNOSIS — E11.9 TYPE 2 DIABETES MELLITUS WITHOUT COMPLICATION, WITHOUT LONG-TERM CURRENT USE OF INSULIN (H): Primary | ICD-10-CM

## 2025-07-28 DIAGNOSIS — E11.9 TYPE 2 DIABETES MELLITUS WITHOUT COMPLICATION, WITHOUT LONG-TERM CURRENT USE OF INSULIN (H): ICD-10-CM

## 2025-07-28 LAB
EST. AVERAGE GLUCOSE BLD GHB EST-MCNC: 177 MG/DL
HBA1C MFR BLD: 7.8 %

## 2025-07-28 PROCEDURE — 36415 COLL VENOUS BLD VENIPUNCTURE: CPT

## 2025-07-28 PROCEDURE — 83036 HEMOGLOBIN GLYCOSYLATED A1C: CPT

## 2025-07-28 PROCEDURE — 3051F HG A1C>EQUAL 7.0%<8.0%: CPT

## 2025-07-28 RX ORDER — GLIMEPIRIDE 2 MG/1
2 TABLET ORAL
Qty: 180 TABLET | Refills: 3 | Status: SHIPPED | OUTPATIENT
Start: 2025-07-28

## 2025-07-28 ASSESSMENT — PAIN SCALES - GENERAL: PAINLEVEL_OUTOF10: NO PAIN (0)

## 2025-08-05 DIAGNOSIS — I10 BENIGN ESSENTIAL HYPERTENSION: ICD-10-CM

## 2025-08-05 RX ORDER — DILTIAZEM HYDROCHLORIDE 360 MG/1
360 CAPSULE, EXTENDED RELEASE ORAL DAILY
Qty: 90 CAPSULE | Refills: 1 | Status: SHIPPED | OUTPATIENT
Start: 2025-08-05

## 2025-08-19 DIAGNOSIS — I10 BENIGN ESSENTIAL HYPERTENSION: ICD-10-CM

## 2025-08-19 RX ORDER — LISINOPRIL 40 MG/1
40 TABLET ORAL DAILY
Qty: 90 TABLET | Refills: 3 | Status: SHIPPED | OUTPATIENT
Start: 2025-08-19

## 2025-09-04 DIAGNOSIS — E11.9 TYPE 2 DIABETES MELLITUS WITHOUT COMPLICATION, WITHOUT LONG-TERM CURRENT USE OF INSULIN (H): ICD-10-CM

## 2025-09-04 RX ORDER — ROSUVASTATIN CALCIUM 10 MG/1
10 TABLET, COATED ORAL DAILY
Qty: 90 TABLET | Refills: 1 | Status: SHIPPED | OUTPATIENT
Start: 2025-09-04

## (undated) DEVICE — TUBING SUCTION 20FT N620A

## (undated) DEVICE — SOL WATER IRRIG 1000ML BOTTLE 2F7114

## (undated) DEVICE — SUCTION MANIFOLD NEPTUNE 2 SYS 1 PORT 702-025-000

## (undated) DEVICE — CONNECTOR ERBEFLO 2 PORT 20325-215

## (undated) DEVICE — CANISTER SUCTION MEDI-VAC GUARDIAN 2000ML 90D 65651-220

## (undated) RX ORDER — PROPOFOL 10 MG/ML
INJECTION, EMULSION INTRAVENOUS
Status: DISPENSED
Start: 2024-12-19